# Patient Record
Sex: MALE | Race: WHITE | NOT HISPANIC OR LATINO | URBAN - METROPOLITAN AREA
[De-identification: names, ages, dates, MRNs, and addresses within clinical notes are randomized per-mention and may not be internally consistent; named-entity substitution may affect disease eponyms.]

---

## 2017-01-24 ENCOUNTER — APPOINTMENT (RX ONLY)
Dept: URBAN - METROPOLITAN AREA CLINIC 349 | Facility: CLINIC | Age: 82
Setting detail: DERMATOLOGY
End: 2017-01-24

## 2017-01-24 DIAGNOSIS — Z85.828 PERSONAL HISTORY OF OTHER MALIGNANT NEOPLASM OF SKIN: ICD-10-CM

## 2017-01-24 DIAGNOSIS — L57.0 ACTINIC KERATOSIS: ICD-10-CM

## 2017-01-24 DIAGNOSIS — L82.1 OTHER SEBORRHEIC KERATOSIS: ICD-10-CM

## 2017-01-24 PROBLEM — C44.311 BASAL CELL CARCINOMA OF SKIN OF NOSE: Status: ACTIVE | Noted: 2017-01-24

## 2017-01-24 PROCEDURE — A4550 SURGICAL TRAYS: HCPCS

## 2017-01-24 PROCEDURE — ? SHAVE REMOVAL AND DESTRUCTION

## 2017-01-24 PROCEDURE — ? PATHOLOGY BILLING

## 2017-01-24 PROCEDURE — ? LIQUID NITROGEN

## 2017-01-24 PROCEDURE — ? COUNSELING

## 2017-01-24 PROCEDURE — 99202 OFFICE O/P NEW SF 15 MIN: CPT | Mod: 25

## 2017-01-24 PROCEDURE — 88305 TISSUE EXAM BY PATHOLOGIST: CPT

## 2017-01-24 PROCEDURE — 17281 DSTR MAL LS F/E/E/N/L/M .6-1: CPT | Mod: 59

## 2017-01-24 PROCEDURE — 17281 DSTR MAL LS F/E/E/N/L/M .6-1: CPT | Mod: 59,76

## 2017-01-24 PROCEDURE — ? OTHER

## 2017-01-24 PROCEDURE — 17004 DESTROY PREMAL LESIONS 15/>: CPT

## 2017-01-24 ASSESSMENT — LOCATION DETAILED DESCRIPTION DERM
LOCATION DETAILED: RIGHT SUPERIOR PARIETAL SCALP
LOCATION DETAILED: RIGHT INFERIOR MEDIAL FOREHEAD
LOCATION DETAILED: RIGHT SUPERIOR MEDIAL FOREHEAD
LOCATION DETAILED: LEFT SUPERIOR PARIETAL SCALP
LOCATION DETAILED: LEFT INFERIOR FRONTAL SCALP
LOCATION DETAILED: RIGHT SUPERIOR MEDIAL FOREHEAD
LOCATION DETAILED: RIGHT RADIAL DORSAL HAND
LOCATION DETAILED: RIGHT CENTRAL TEMPLE
LOCATION DETAILED: LEFT INFERIOR LATERAL FOREHEAD
LOCATION DETAILED: LEFT CENTRAL FRONTAL SCALP
LOCATION DETAILED: RIGHT MID PREAURICULAR CHEEK
LOCATION DETAILED: RIGHT SUPERIOR POSTAURICULAR SKIN
LOCATION DETAILED: RIGHT SUPERIOR NASAL CHEEK
LOCATION DETAILED: LEFT SUPERIOR OCCIPITAL SCALP
LOCATION DETAILED: RIGHT DISTAL POSTERIOR THIGH
LOCATION DETAILED: RIGHT SUPERIOR FOREHEAD
LOCATION DETAILED: SUPERIOR THORACIC SPINE
LOCATION DETAILED: RIGHT MEDIAL FRONTAL SCALP
LOCATION DETAILED: LEFT MEDIAL SUPERIOR CHEST
LOCATION DETAILED: POSTERIOR MID-PARIETAL SCALP
LOCATION DETAILED: LEFT ANTERIOR DISTAL THIGH
LOCATION DETAILED: MID-OCCIPITAL SCALP
LOCATION DETAILED: LEFT SUPERIOR LATERAL MALAR CHEEK
LOCATION DETAILED: LEFT CENTRAL FRONTAL SCALP
LOCATION DETAILED: RIGHT CENTRAL PARIETAL SCALP

## 2017-01-24 ASSESSMENT — LOCATION SIMPLE DESCRIPTION DERM
LOCATION SIMPLE: RIGHT TEMPLE
LOCATION SIMPLE: LEFT OCCIPITAL SCALP
LOCATION SIMPLE: SCALP
LOCATION SIMPLE: CHEST
LOCATION SIMPLE: RIGHT CHEEK
LOCATION SIMPLE: RIGHT CHEEK
LOCATION SIMPLE: LEFT FOREHEAD
LOCATION SIMPLE: POSTERIOR SCALP
LOCATION SIMPLE: LEFT CHEEK
LOCATION SIMPLE: UPPER BACK
LOCATION SIMPLE: RIGHT POSTERIOR THIGH
LOCATION SIMPLE: LEFT THIGH
LOCATION SIMPLE: RIGHT FOREHEAD
LOCATION SIMPLE: SCALP
LOCATION SIMPLE: RIGHT SCALP
LOCATION SIMPLE: RIGHT FOREHEAD
LOCATION SIMPLE: RIGHT HAND

## 2017-01-24 ASSESSMENT — LOCATION ZONE DERM
LOCATION ZONE: SCALP
LOCATION ZONE: HAND
LOCATION ZONE: FACE
LOCATION ZONE: FACE
LOCATION ZONE: LEG
LOCATION ZONE: SCALP
LOCATION ZONE: TRUNK

## 2017-01-24 ASSESSMENT — PAIN INTENSITY VAS: HOW INTENSE IS YOUR PAIN 0 BEING NO PAIN, 10 BEING THE MOST SEVERE PAIN POSSIBLE?: NO PAIN

## 2017-01-24 NOTE — PROCEDURE: OTHER
Other (Free Text): After the procedure, the patient was observed 5-10 minutes and was oriented to person, place and time and denied feeling dizzy, queasy and stated that they  did not feel that they were going to faint
Note Text (......Xxx Chief Complaint.): This diagnosis correlates with the
Detail Level: Generalized

## 2017-01-24 NOTE — PROCEDURE: SHAVE REMOVAL AND DESTRUCTION
Notification Instructions: Patient will be notified of biopsy results. However, patient instructed to call the office if not contacted within 2 weeks.
Bill For Surgical Tray: yes
Anesthesia Type: 2% lidocaine with epinephrine
Consent: Written consent was obtained and risks were reviewed including but not limited to scarring, infection, bleeding, scabbing, incomplete removal, nerve damage and allergy to anesthesia.
Hemostasis: Drysol
Post-Care Instructions: I reviewed with the patient in detail post-care instructions. Patient is to keep the biopsy site dry overnight, and then apply bacitracin twice daily until healed. Patient may apply hydrogen peroxide soaks to remove any crusting.  After the procedure, the patient was observed for 5-10 minutes and was oriented to,person, place and time and denied feeling dizzy, queasy and stated that they were not going to faint
Size Of Lesion In Cm: 0
Bill [id number] For Specimen Handling/Conveyance To Laboratory?: no
Size After Destruction (Required For Destruction Billing): 0.7
Accession #: Dr newsome read
Size After Destruction (Required For Destruction Billing): 0.6
Billing Type: Third-Party Bill
Detail Level: Detailed
Bill As?: Note: Bill Malignant Destruction If Path Confirms Malignant Lesion. Only Bill As Shave Removal If Path Comes Back Benign. Do Not Bill Shave Removal On Malignant Lesions.: Malignant Destruction
Cautery Type: electrodesiccation
Number Of Curettages: 1
Wound Care: Vaseline
Dressing: dry sterile dressing

## 2017-01-24 NOTE — PROCEDURE: LIQUID NITROGEN
Duration Of Freeze Thaw-Cycle (Seconds): 3
Detail Level: Detailed
Consent: The patient's consent was obtained including but not limited to risks of crusting, scabbing, blistering, scarring, darker or lighter pigmentary change, recurrence, incomplete removal and infection.
Render Post-Care Instructions In Note?: no
Number Of Freeze-Thaw Cycles: 2 freeze-thaw cycles
Post-Care Instructions: I reviewed with the patient in detail post-care instructions. Patient is to wear sunprotection, and avoid picking at any of the treated lesions. Pt may apply Vaseline to crusted or scabbing areas.

## 2017-03-13 PROBLEM — H91.90 HEARING LOSS: Status: ACTIVE | Noted: 2017-03-13

## 2017-05-05 ENCOUNTER — APPOINTMENT (RX ONLY)
Dept: URBAN - METROPOLITAN AREA CLINIC 349 | Facility: CLINIC | Age: 82
Setting detail: DERMATOLOGY
End: 2017-05-05

## 2017-05-05 DIAGNOSIS — L85.3 XEROSIS CUTIS: ICD-10-CM

## 2017-05-05 DIAGNOSIS — L82.0 INFLAMED SEBORRHEIC KERATOSIS: ICD-10-CM

## 2017-05-05 DIAGNOSIS — Z85.828 PERSONAL HISTORY OF OTHER MALIGNANT NEOPLASM OF SKIN: ICD-10-CM | Status: STABLE

## 2017-05-05 DIAGNOSIS — L57.0 ACTINIC KERATOSIS: ICD-10-CM

## 2017-05-05 PROBLEM — K21.9 GASTRO-ESOPHAGEAL REFLUX DISEASE WITHOUT ESOPHAGITIS: Status: ACTIVE | Noted: 2017-05-05

## 2017-05-05 PROCEDURE — ? RECOMMENDATIONS

## 2017-05-05 PROCEDURE — ? COUNSELING

## 2017-05-05 PROCEDURE — 17004 DESTROY PREMAL LESIONS 15/>: CPT

## 2017-05-05 PROCEDURE — 99213 OFFICE O/P EST LOW 20 MIN: CPT | Mod: 25

## 2017-05-05 PROCEDURE — ? LIQUID NITROGEN

## 2017-05-05 PROCEDURE — 17110 DESTRUCTION B9 LES UP TO 14: CPT | Mod: 59

## 2017-05-05 ASSESSMENT — LOCATION SIMPLE DESCRIPTION DERM
LOCATION SIMPLE: RIGHT CHEEK
LOCATION SIMPLE: RIGHT HAND
LOCATION SIMPLE: UPPER BACK
LOCATION SIMPLE: LEFT FOREHEAD
LOCATION SIMPLE: LEFT CHEEK
LOCATION SIMPLE: LEFT TEMPLE
LOCATION SIMPLE: CHEST
LOCATION SIMPLE: LEFT OCCIPITAL SCALP
LOCATION SIMPLE: POSTERIOR SCALP
LOCATION SIMPLE: RIGHT FOREHEAD
LOCATION SIMPLE: LEFT HAND
LOCATION SIMPLE: RIGHT CLAVICULAR SKIN
LOCATION SIMPLE: SCALP

## 2017-05-05 ASSESSMENT — LOCATION DETAILED DESCRIPTION DERM
LOCATION DETAILED: RIGHT SUPERIOR OCCIPITAL SCALP
LOCATION DETAILED: RIGHT LATERAL MALAR CHEEK
LOCATION DETAILED: LEFT SUPERIOR POSTERIOR PARIETAL SCALP
LOCATION DETAILED: POSTERIOR MID-PARIETAL SCALP
LOCATION DETAILED: RIGHT LATERAL FOREHEAD
LOCATION DETAILED: RIGHT SUPERIOR MEDIAL MALAR CHEEK
LOCATION DETAILED: RIGHT SUPERIOR MEDIAL FOREHEAD
LOCATION DETAILED: RIGHT POSTERIOR PARIETAL SCALP
LOCATION DETAILED: RIGHT SUPERIOR PARIETAL SCALP
LOCATION DETAILED: RIGHT LATERAL SUBMANDIBULAR CHEEK
LOCATION DETAILED: LEFT RADIAL DORSAL HAND
LOCATION DETAILED: LEFT SUPERIOR PARIETAL SCALP
LOCATION DETAILED: LEFT LATERAL TEMPLE
LOCATION DETAILED: RIGHT CLAVICULAR SKIN
LOCATION DETAILED: LEFT SUPERIOR OCCIPITAL SCALP
LOCATION DETAILED: RIGHT RADIAL DORSAL HAND
LOCATION DETAILED: LEFT FOREHEAD
LOCATION DETAILED: LEFT INFERIOR CENTRAL MALAR CHEEK
LOCATION DETAILED: LEFT MEDIAL SUPERIOR CHEST
LOCATION DETAILED: RIGHT SUPERIOR FOREHEAD
LOCATION DETAILED: SUPERIOR THORACIC SPINE
LOCATION DETAILED: RIGHT MEDIAL SUPERIOR CHEST
LOCATION DETAILED: RIGHT SUPERIOR CENTRAL MALAR CHEEK
LOCATION DETAILED: RIGHT INFERIOR FOREHEAD
LOCATION DETAILED: LEFT LATERAL FOREHEAD

## 2017-05-05 ASSESSMENT — LOCATION ZONE DERM
LOCATION ZONE: SCALP
LOCATION ZONE: FACE
LOCATION ZONE: TRUNK
LOCATION ZONE: HAND

## 2017-05-05 ASSESSMENT — PAIN INTENSITY VAS: HOW INTENSE IS YOUR PAIN 0 BEING NO PAIN, 10 BEING THE MOST SEVERE PAIN POSSIBLE?: NO PAIN

## 2017-05-05 NOTE — PROCEDURE: RECOMMENDATIONS
Recommendations (Free Text): Dove soap and moisturizer. Samples of cetaphil and CeraVe cream apply twice daily as needed   to face
Detail Level: Zone

## 2017-05-05 NOTE — PROCEDURE: LIQUID NITROGEN
Consent: The patient's consent was obtained including but not limited to risks of crusting, scabbing, blistering, scarring, darker or lighter pigmentary change, recurrence, incomplete removal and infection.
Duration Of Freeze Thaw-Cycle (Seconds): 3
Number Of Freeze-Thaw Cycles: 2 freeze-thaw cycles
Detail Level: Detailed
Medical Necessity Clause: This procedure was medically necessary because the lesions that were treated were:
Add 52 Modifier (Optional): no
Post-Care Instructions: I reviewed with the patient in detail post-care instructions. Patient is to wear sunprotection, and avoid picking at any of the treated lesions. Pt may apply Vaseline to crusted or scabbing areas.
Medical Necessity Information: It is in your best interest to select a reason for this procedure from the list below. All of these items fulfill various CMS LCD requirements except the new and changing color options.
Number Of Freeze-Thaw Cycles: 1 freeze-thaw cycle
Include Z78.9 (Other Specified Conditions Influencing Health Status) As An Associated Diagnosis?: Yes

## 2017-06-05 ENCOUNTER — APPOINTMENT (RX ONLY)
Dept: URBAN - METROPOLITAN AREA CLINIC 349 | Facility: CLINIC | Age: 82
Setting detail: DERMATOLOGY
End: 2017-06-05

## 2017-06-05 PROBLEM — D04.62 CARCINOMA IN SITU OF SKIN OF LEFT UPPER LIMB, INCLUDING SHOULDER: Status: ACTIVE | Noted: 2017-06-05

## 2017-06-05 PROCEDURE — ? PATHOLOGY BILLING

## 2017-06-05 PROCEDURE — ? SHAVE REMOVAL AND DESTRUCTION

## 2017-06-05 PROCEDURE — 17261 DSTRJ MAL LES T/A/L .6-1.0CM: CPT

## 2017-06-05 PROCEDURE — 88305 TISSUE EXAM BY PATHOLOGIST: CPT

## 2017-06-05 PROCEDURE — ? COUNSELING

## 2017-06-05 PROCEDURE — A4550 SURGICAL TRAYS: HCPCS

## 2017-06-05 NOTE — PROCEDURE: SHAVE REMOVAL AND DESTRUCTION
Anesthesia Type: 1% lidocaine with epinephrine
Wound Care: Vaseline
Number Of Curettages: 1
Billing Type: Third-Party Bill
Was Curettage Performed?: Yes
Accession #: Dr newsome read
Size Of Lesion In Cm: 0.8
Dressing: dry sterile dressing
Bill [id number] For Specimen Handling/Conveyance To Laboratory?: no
Notification Instructions: Patient will be notified of biopsy results. However, patient instructed to call the office if not contacted within 2 weeks.
Bill As?: Note: Bill Malignant Destruction If Path Confirms Malignant Lesion. Only Bill As Shave Removal If Path Comes Back Benign. Do Not Bill Shave Removal On Malignant Lesions.: Malignant Destruction
Cautery Type: electrodesiccation
Consent: Written consent was obtained and risks were reviewed including but not limited to scarring, infection, bleeding, scabbing, incomplete removal, nerve damage and allergy to anesthesia.
Detail Level: Detailed
Anesthesia Volume In Cc: 0.4
Hemostasis: Electrocautery
Post-Care Instructions: I reviewed with the patient in detail post-care instructions. Patient is to keep the biopsy site dry overnight, and then apply bacitracin twice daily until healed. Patient may apply hydrogen peroxide soaks to remove any crusting.  After the procedure, the patient was observed for 5-10 minutes and was oriented to,person, place and time and denied feeling dizzy, queasy and stated that they were not going to faint

## 2017-07-10 ENCOUNTER — APPOINTMENT (RX ONLY)
Dept: URBAN - METROPOLITAN AREA CLINIC 349 | Facility: CLINIC | Age: 82
Setting detail: DERMATOLOGY
End: 2017-07-10

## 2017-07-10 DIAGNOSIS — L82.1 OTHER SEBORRHEIC KERATOSIS: ICD-10-CM

## 2017-07-10 DIAGNOSIS — L57.0 ACTINIC KERATOSIS: ICD-10-CM

## 2017-07-10 DIAGNOSIS — L21.8 OTHER SEBORRHEIC DERMATITIS: ICD-10-CM

## 2017-07-10 PROBLEM — I10 ESSENTIAL (PRIMARY) HYPERTENSION: Status: ACTIVE | Noted: 2017-07-10

## 2017-07-10 PROBLEM — D04.62 CARCINOMA IN SITU OF SKIN OF LEFT UPPER LIMB, INCLUDING SHOULDER: Status: ACTIVE | Noted: 2017-07-10

## 2017-07-10 PROCEDURE — 99213 OFFICE O/P EST LOW 20 MIN: CPT | Mod: 25

## 2017-07-10 PROCEDURE — ? PATHOLOGY BILLING

## 2017-07-10 PROCEDURE — ? PRESCRIPTION

## 2017-07-10 PROCEDURE — 88305 TISSUE EXAM BY PATHOLOGIST: CPT

## 2017-07-10 PROCEDURE — ? SHAVE REMOVAL AND DESTRUCTION

## 2017-07-10 PROCEDURE — 17271 DSTR MAL LES S/N/H/F/G 0.6-1: CPT

## 2017-07-10 PROCEDURE — ? LIQUID NITROGEN

## 2017-07-10 PROCEDURE — 17004 DESTROY PREMAL LESIONS 15/>: CPT

## 2017-07-10 PROCEDURE — ? COUNSELING

## 2017-07-10 PROCEDURE — A4550 SURGICAL TRAYS: HCPCS

## 2017-07-10 RX ORDER — KETOCONAZOLE 20 MG/G
CREAM TOPICAL
Qty: 1 | Refills: 5 | Status: ERX | COMMUNITY
Start: 2017-07-10

## 2017-07-10 RX ADMIN — KETOCONAZOLE: 20 CREAM TOPICAL at 00:00

## 2017-07-10 ASSESSMENT — LOCATION SIMPLE DESCRIPTION DERM
LOCATION SIMPLE: SCALP
LOCATION SIMPLE: RIGHT OCCIPITAL SCALP
LOCATION SIMPLE: LEFT FOREARM
LOCATION SIMPLE: RIGHT HAND
LOCATION SIMPLE: RIGHT SCALP
LOCATION SIMPLE: LEFT HAND
LOCATION SIMPLE: RIGHT TEMPLE
LOCATION SIMPLE: RIGHT FOREHEAD
LOCATION SIMPLE: RIGHT ZYGOMA
LOCATION SIMPLE: POSTERIOR SCALP
LOCATION SIMPLE: RIGHT FOREARM
LOCATION SIMPLE: LEFT FOREHEAD

## 2017-07-10 ASSESSMENT — LOCATION DETAILED DESCRIPTION DERM
LOCATION DETAILED: RIGHT CENTRAL TEMPLE
LOCATION DETAILED: LEFT SUPERIOR PARIETAL SCALP
LOCATION DETAILED: LEFT CENTRAL PARIETAL SCALP
LOCATION DETAILED: RIGHT PROXIMAL DORSAL FOREARM
LOCATION DETAILED: LEFT ULNAR DORSAL HAND
LOCATION DETAILED: RIGHT CENTRAL FRONTAL SCALP
LOCATION DETAILED: LEFT RADIAL DORSAL HAND
LOCATION DETAILED: MID-OCCIPITAL SCALP
LOCATION DETAILED: LEFT SUPERIOR FOREHEAD
LOCATION DETAILED: LEFT DISTAL DORSAL FOREARM
LOCATION DETAILED: LEFT PROXIMAL DORSAL FOREARM
LOCATION DETAILED: RIGHT LATERAL ZYGOMA
LOCATION DETAILED: RIGHT ULNAR DORSAL HAND
LOCATION DETAILED: RIGHT INFERIOR LATERAL FOREHEAD
LOCATION DETAILED: RIGHT CENTRAL PARIETAL SCALP
LOCATION DETAILED: RIGHT SUPERIOR OCCIPITAL SCALP

## 2017-07-10 ASSESSMENT — LOCATION ZONE DERM
LOCATION ZONE: ARM
LOCATION ZONE: FACE
LOCATION ZONE: HAND
LOCATION ZONE: SCALP

## 2017-07-10 ASSESSMENT — PAIN INTENSITY VAS: HOW INTENSE IS YOUR PAIN 0 BEING NO PAIN, 10 BEING THE MOST SEVERE PAIN POSSIBLE?: NO PAIN

## 2017-07-10 NOTE — PROCEDURE: SHAVE REMOVAL AND DESTRUCTION
Was Curettage Performed?: Yes
Size After Destruction (Required For Destruction Billing): 1
Dressing: dry sterile dressing
Post-Care Instructions: I reviewed with the patient in detail post-care instructions. Patient is to keep the biopsy site dry overnight, and then apply bacitracin twice daily until healed. Patient may apply hydrogen peroxide soaks to remove any crusting.  After the procedure, the patient was observed for 5-10 minutes and was oriented to,person, place and time and denied feeling dizzy, queasy and stated that they were not going to faint
Anesthesia Type: 1% lidocaine with epinephrine
Cautery Type: electrodesiccation
Consent: Written consent was obtained and risks were reviewed including but not limited to scarring, infection, bleeding, scabbing, incomplete removal, nerve damage and allergy to anesthesia.
Notification Instructions: Patient will be notified of biopsy results. However, patient instructed to call the office if not contacted within 2 weeks.
Number Of Curettages: 2
Billing Type: Third-Party Bill
Hemostasis: Electrocautery
Size Of Lesion In Cm: 0
Bill [id number] For Specimen Handling/Conveyance To Laboratory?: no
Detail Level: Detailed
Wound Care: Vaseline
Accession #: dr newsome read
Anesthesia Volume In Cc: 0.4
Bill As?: Note: Bill Malignant Destruction If Path Confirms Malignant Lesion. Only Bill As Shave Removal If Path Comes Back Benign. Do Not Bill Shave Removal On Malignant Lesions.: Malignant Destruction

## 2017-07-10 NOTE — PROCEDURE: LIQUID NITROGEN
Detail Level: Zone
Post-Care Instructions: I reviewed with the patient in detail post-care instructions. Patient is to wear sunprotection, and avoid picking at any of the treated lesions. Pt may apply Vaseline to crusted or scabbing areas.
Duration Of Freeze Thaw-Cycle (Seconds): 3
Number Of Freeze-Thaw Cycles: 2 freeze-thaw cycles
Consent: The patient's consent was obtained including but not limited to risks of crusting, scabbing, blistering, scarring, darker or lighter pigmentary change, recurrence, incomplete removal and infection.
Render Post-Care Instructions In Note?: no

## 2017-08-16 ENCOUNTER — RX ONLY (OUTPATIENT)
Age: 82
Setting detail: RX ONLY
End: 2017-08-16

## 2017-08-16 RX ORDER — KETOCONAZOLE 20 MG/G
CREAM TOPICAL
Qty: 1 | Refills: 5 | Status: CANCELLED
Stop reason: CLARIF

## 2017-08-16 RX ORDER — KETOCONAZOLE 20 MG/G
CREAM TOPICAL
Qty: 1 | Refills: 5 | Status: CANCELLED

## 2017-08-23 ENCOUNTER — APPOINTMENT (RX ONLY)
Dept: URBAN - METROPOLITAN AREA CLINIC 349 | Facility: CLINIC | Age: 82
Setting detail: DERMATOLOGY
End: 2017-08-23

## 2017-08-23 DIAGNOSIS — L57.0 ACTINIC KERATOSIS: ICD-10-CM

## 2017-08-23 PROCEDURE — 17000 DESTRUCT PREMALG LESION: CPT

## 2017-08-23 PROCEDURE — ? LIQUID NITROGEN

## 2017-08-23 PROCEDURE — ? PRESCRIPTION

## 2017-08-23 PROCEDURE — ? COUNSELING

## 2017-08-23 PROCEDURE — 17003 DESTRUCT PREMALG LES 2-14: CPT

## 2017-08-23 RX ORDER — FLUOROURACIL 50 MG/G
CREAM TOPICAL
Qty: 1 | Refills: 1 | Status: ERX | COMMUNITY
Start: 2017-08-23

## 2017-08-23 RX ADMIN — FLUOROURACIL: 50 CREAM TOPICAL at 00:00

## 2017-08-23 ASSESSMENT — LOCATION ZONE DERM
LOCATION ZONE: FACE
LOCATION ZONE: EAR
LOCATION ZONE: SCALP

## 2017-08-23 ASSESSMENT — LOCATION DETAILED DESCRIPTION DERM
LOCATION DETAILED: LEFT MEDIAL FRONTAL SCALP
LOCATION DETAILED: LEFT CENTRAL PARIETAL SCALP
LOCATION DETAILED: LEFT CENTRAL FRONTAL SCALP
LOCATION DETAILED: LEFT ANTITRAGUS
LOCATION DETAILED: LEFT SUPERIOR FOREHEAD
LOCATION DETAILED: RIGHT SUPERIOR PARIETAL SCALP

## 2017-08-23 ASSESSMENT — LOCATION SIMPLE DESCRIPTION DERM
LOCATION SIMPLE: LEFT FOREHEAD
LOCATION SIMPLE: LEFT SCALP
LOCATION SIMPLE: SCALP
LOCATION SIMPLE: LEFT EAR

## 2017-08-23 NOTE — PROCEDURE: LIQUID NITROGEN
Consent: The patient's consent was obtained including but not limited to risks of crusting, scabbing, blistering, scarring, darker or lighter pigmentary change, recurrence, incomplete removal and infection.
Post-Care Instructions: I reviewed with the patient in detail post-care instructions. Patient is to wear sunprotection, and avoid picking at any of the treated lesions. Pt may apply Vaseline to crusted or scabbing areas.
Render Post-Care Instructions In Note?: no
Detail Level: Zone
Number Of Freeze-Thaw Cycles: 2 freeze-thaw cycles
Duration Of Freeze Thaw-Cycle (Seconds): 3

## 2017-10-05 PROBLEM — M16.11 PRIMARY OSTEOARTHRITIS OF RIGHT HIP: Status: ACTIVE | Noted: 2017-10-05

## 2017-10-10 ENCOUNTER — HOSPITAL ENCOUNTER (OUTPATIENT)
Dept: PHYSICAL THERAPY | Age: 82
Discharge: HOME | End: 2017-10-10
Attending: INTERNAL MEDICINE
Payer: MEDICARE

## 2017-10-10 DIAGNOSIS — M16.11 PRIMARY OSTEOARTHRITIS OF RIGHT HIP: ICD-10-CM

## 2017-10-10 PROCEDURE — 97110 THERAPEUTIC EXERCISES: CPT | Performed by: PHYSICAL THERAPIST

## 2017-10-10 PROCEDURE — 97140 MANUAL THERAPY 1/> REGIONS: CPT | Performed by: PHYSICAL THERAPIST

## 2017-10-10 PROCEDURE — G8981 BODY POS CURRENT STATUS: HCPCS | Performed by: PHYSICAL THERAPIST

## 2017-10-10 PROCEDURE — G8982 BODY POS GOAL STATUS: HCPCS | Performed by: PHYSICAL THERAPIST

## 2017-10-10 PROCEDURE — 97161 PT EVAL LOW COMPLEX 20 MIN: CPT | Performed by: PHYSICAL THERAPIST

## 2017-10-10 NOTE — PROGRESS NOTES
Ambulatory/Rehab Services H2 Model Falls Risk Assessment    Risk Factor Pts. ·   Confusion/Disorientation/Impulsivity  []    4 ·   Symptomatic Depression  []   2 ·   Altered Elimination  []   1 ·   Dizziness/Vertigo  []   1 ·   Gender (Male)  [x]   1 ·   Any administered antiepileptics (anticonvulsants):  []   2 ·   Any administered benzodiazepines:  []   1 ·   Visual Impairment (specify):  []   1 ·   Portable Oxygen Use  []   1 ·   Orthostatic ? BP  []   1 ·   History of Recent Falls (within 3 mos.)  []   5     Ability to Rise from Chair (choose one) Pts. ·   Ability to rise in a single movement  []   0 ·   Pushes up, successful in one attempt  []   1 ·   Multiple attempts, but successful  [x]   3 ·   Unable to rise without assistance  []   4   Total: (5 or greater = High Risk) 4     Falls Prevention Plan:   []                Physical Limitations to Exercise (specify):   []                Mobility Assistance Device (type):   []                Exercise/Equipment Adaptation (specify):    ©2010 [facility] [address] Patent #[id number].  Federal Law prohibits the replication, distribution or use without written permission from [facility] [employer]

## 2017-10-10 NOTE — PROGRESS NOTES
Willy New  : 1935 [address] [facility] at [address], [address], [place], [address] [address]  [phone]   [phone]         OUTPATIENT PHYSICAL THERAPY:Initial Assessment 10/10/2017    ICD-10: Treatment Diagnosis: (M25.551) R hip pain; (M62.81) muscle weakness; (R26.2) Difficulty walking  Precautions/Allergies:   Latex   Fall Risk Score: 4 (? 5 = High Risk)  MD Orders: Evaluate and Treat for R hip pain secondary to primary OA MEDICAL/REFERRING DIAGNOSIS:  Primary osteoarthritis of right hip [M16.11]   DATE OF ONSET: 17  REFERRING PHYSICIAN: Rios Gonsales MD  RETURN PHYSICIAN APPOINTMENT: PRN     INITIAL ASSESSMENT:  Mr. Marya Santamaria presents today with functional limitations associated with the diagnosis of severe R hip OA, as well as, L anterior innominate rotation, as well as, mild rotational fault at L5 contributing to increased R side anterior/lateral hip pain and weakness. He currently requires assistance with supine to sit to stand transfers and cannot sleep in the bed due to pain. Pt states all of this occurred after lying on a table for x-rays. Pt will benefit from skilled PT to address the following deficits. PROBLEM LIST (Impacting functional limitations):  1. Decreased Strength  2. Decreased ADL/Functional Activities  3. Decreased Transfer Abilities  4. Decreased Ambulation Ability/Technique  5. Decreased Balance  6. Increased Pain  7. Decreased Activity Tolerance  8. Decreased Pacing Skills  9. Increased Fatigue  10. Increased Shortness of Breath  11. Decreased Flexibility/Joint Mobility  12. Decreased Knowledge of Precautions  13. Decreased [place] with Home Exercise Program INTERVENTIONS PLANNED:  1. Balance Exercise  2. Bed Mobility  3. Cold  4. Cryotherapy  5. Family Education  6. Gait Training  7. Heat  8. Home Exercise Program (HEP)  9. Manual Therapy  10. Neuromuscular Re-education/Strengthening  11.  Range of Motion (ROM)  12. Therapeutic Activites  13. Therapeutic Exercise/Strengthening  14. Transfer Training  15. Ultrasound (US)  16. Aquatic Therapy   17. Kinesiotaping   TREATMENT PLAN:  Effective Dates: 10/10/17 TO 1/2/18. Frequency/Duration: 1-2 times a week for 12 weeks  GOALS: (Goals have been discussed and agreed upon with patient.)  Short-Term Functional Goals: Time Frame: 4-6 weeks (11-21-17)  1. Pt will be compliant and independent with HEP. 2. Pt will improve LEFS score to 22/80 to increase pt's overall functional mobility. 3. Pt will improve stdg R hip AROM to > Flex: 30deg; ABD:25deg; Ext: 25deg; ER:12deg and IR:5deg to increase pt's overall functional mobility. 4. Pt will be able to sit-stand from std height w/ use of UE for A to rise. 5. Pt will be able to transfer into and out of bed with minimal assistance from his wife. 6. Pt will be able to return to sleeping in his bed due to decreased R hip/leg pain. Discharge Goals: Time Frame: 8-12 weeks (1-2-18)  1. Pt will improve LEFS score to 30/80 to increase pt's overall functional mobility. 2. Pt will improve FLR from  CM to  CL to increase pt's overall function. 3. Pt will be able to sit-stand from std height w/use of 1 UE only for A.  4. Pt will be able to transfer into and out of bed independently. 5. Pt will be able to ambulate with a near normal gait pattern with LRD with manageable R hip/leg pain. 6. Pt will be able to return to driving, and subjectively report greater ease and independence with car transfers. 7. Pt will be DC'd from PT to HEP. Rehabilitation Potential For Stated Goals: [id number] [facility] therapy, I certify that the treatment plan above will be carried out by a therapist or under their direction.   Thank you for this referral,  Lucita Casey, PT     Referring Physician Signature: Aster Ross MD              Date                    The information in this section was collected on 10/10/17 (except where otherwise noted). HISTORY:   History of Present Injury/Illness (Reason for Referral):  Pt reports he's had R hip pain off and on for years due to severe R hip OA. He states he's been evaluated by multiple orthopedists which all state he's inoperable. He states he was most recently evaluated by Dr. Alona Osorio who injected his hip a couple months ago, which did not improve. He states he returned to Dr. Alona Osorio for further evaluation, and experienced severe R hip/leg pain after being x-rayed. He states he's been unable to drive, and has had severe difficulty with transfers since that time. Pt reports he takes Tylenol for the pain, but nothing really helps. He states supine to sit to stand and car transfers aggravate his pain badly. He states he must now sleep in his recliner and is no longer driving. PATIENT HAS A PACEMAKER AND R TKA W? FLEX 85 DEG. Past Medical History/Comorbidities:   Mr. Tremayne Hernandez  has a past medical history of Arthritis; Atrial fibrillation ([address] CAD (coronary artery disease); Cancer ([address] DM (diabetes mellitus), type 2 ([address] Family history of prostate problems; GERD (gastroesophageal reflux disease); Hearing difficulty; HLD (hyperlipidemia); Hypertension; Hypothyroidism; Insulin dependent diabetes mellitus ([address] Kidney problem; and Systolic heart failure ([address] Mr. Tremayne Hernandez  has a past surgical history that includes coronary artery bypass graft; other surgical; other surgical; cataract removal (04/2011); tonsillectomy; hernia repair (08/2004); appendectomy (08/2004); prostatectomy (03/01/2006); cardiac surg procedure unlist (06/2007); aortic valve replacement (11/10/2011); cardiac surg procedure unlist (11/14/2011); cardiac surg procedure unlist (05/10/2013); orthopaedic (1973); and knee replacement (04/25/2016). Social History/Living Environment:     Pt lives in a 1 story home with his wife with 1 small step to enter.  He states he has a raised toilet seat, as well as, walk-in shower and shower chair. He has a rolling collin walker, and a portable lift seat that he takes everywhere. He states he's used it for years due to his R knee immobility. Pt/wife questioned me regarding multiple other pieces of equipment that they are interested in getting for their home that would allow him greater ease with everything, but require more work/assistance from his wife, who is also being treated by PT. I explained I would hold off on any other equipment until we try PT to aide with managing pain and improving strength and mobility. I also voiced my opinion that if he does not begin to try to use his LE, he will continue to lose motion and strength. Pt has a low pain tolerance, which is also contributing to his current situation. He and his wife admitted that he has limited motion in his R knee because PT was too painful at his TKA. Prior Level of Function/Work/Activity:  Pt's overall mobility has been limited for several years due to his severe OA, co-morbidities listed above, low pain tolerance, and personality that he likes to dictate what and how he's going to do things. He states it worsened after he had the x-rays at [facility], and he can no longer transfer independently, drive or sleep in his bed. Dominant Side:         RIGHT    Current Medications:       Current Outpatient Prescriptions:     GLUC/CHND/OM3/DHA/EPA/FISH/STR (GLUCOSAMINE CHONDROITIN PLUS PO), Take 2 Caplet by mouth daily. , Disp: , Rfl:     omeprazole (PRILOSEC) 20 mg capsule, Take 1 capsule by mouth  daily, Disp: 90 Cap, Rfl: 3    potassium chloride (K-DUR, KLOR-CON) 20 mEq tablet, Take 1 tablet by mouth  daily, Disp: 90 Tab, Rfl: 3    simvastatin (ZOCOR) 20 mg tablet, Take 1 tablet by mouth  nightly, Disp: 90 Tab, Rfl: 3    warfarin (COUMADIN) 5 mg tablet, 1 to 1&1/2 tabs every day or as directed, Disp: 120 Tab, Rfl: 3    insulin aspart (NOVOLOG) 100 unit/mL injection, by SubCUTAneous route as needed. , Disp: , Rfl:     clobetasol (TEMOVATE) 0.05 % ointment, Apply  to affected area two (2) times a day., Disp: 15 g, Rfl: 12    tamsulosin (FLOMAX) 0.4 mg capsule, TAKE 1 CAPSULE BY MOUTH EVERY DAY, Disp: 90 Cap, Rfl: 3    carvedilol (COREG) 6.25 mg tablet, Take 1 Tab by mouth two (2) times daily (with meals). , Disp: 180 Tab, Rfl: 2    furosemide (LASIX) 40 mg tablet, Take 1 Tab by mouth two (2) times a day., Disp: 180 Tab, Rfl: 2    levothyroxine (SYNTHROID) 125 mcg tablet, Take 1 Tab by mouth Daily (before breakfast). , Disp: 90 Tab, Rfl: 3    lisinopril (ZESTRIL) 2.5 mg tablet, Take 1 Tab by mouth daily. , Disp: 90 Tab, Rfl: 3    metOLazone (ZAROXOLYN) 2.5 mg tablet, Take 1 Tab by mouth as needed. , Disp: 90 Tab, Rfl: 1    lancets 33 gauge misc, Test BS TID/PRN Dx E11.9. Pt uses one touch delica 33 gauge lancets, Disp: 1 Package, Rfl: 12    Insulin Needles, Disposable, (BD INSULIN PEN NEEDLE UF MINI) 31 gauge x 3/16\" ndle, Use as directed, E11.9, Disp: 100 Pen Needle, Rfl: 12    lancets (ONETOUCH DELICA LANCETS) 30 gauge misc, Testing TID/PRN, E11.9, Disp: 1 Package, Rfl: 12    glucose blood VI test strips (ONETOUCH ULTRA TEST) strip, TEST BS TID AM DX E11.22, Disp: 100 Strip, Rfl: 12    aspirin delayed-release 81 mg tablet, Take 81 mg by mouth daily. , Disp: , Rfl:     multivitamin (ONE A DAY) tablet, Take 1 Tab by mouth daily. , Disp: , Rfl:     insulin glargine (LANTUS) 100 unit/mL injection, 16 Units by SubCUTAneous route. As directed , Disp: , Rfl:     ferrous sulfate (IRON) 325 mg (65 mg iron) tablet, Take 65 mg by mouth Daily (before breakfast). , Disp: , Rfl:    Date Last Reviewed:  10/10/17   Number of Personal Factors/Comorbidities that affect the Plan of Care: 0: LOW COMPLEXITY   EXAMINATION:   Observation/Orthostatic Postural Assessment:          Pt stands with forward head, rounded shoulders, posterior pelvic tilt with equal WB B.   Palpation:          Pt moderately tender with palpation to R GT, as well as, TFL/ITB. Gait: Pt ambulates with RW with equal stance time B, but very short , shuffling step-length. He is able to ambulate without his RW, but it's not safe. Pt had to be encouraged multiple times throughout treatment to avoid ambulating without an AD. Transfers:      Sit-stand: pt requires a moderately elevated surface and uses B UE, as well as, compensatory weight shifting to the L to rise. This is due to R knee immobility, as well as R hip pain and R LE weakness. Supine-sit: despite education on prper technique, pt unwilling to perform and required moderate assistance from me both directions. LE AROM/Strength   DATE  10/10/17 DATE     Hip Flexion R: 20deg;2+/5  L: 40deg; 3+/5 R:   L:    Hip Abduction  R: 15deg; 2-/5  L: 45deg; 3-/5 R:   L:    Hip Adduction R:2+/5  L:3+/5 R:  L:   Hip ER R:8deg; 2-/5  L:20deg; 3-/5 R:  L:   Hip IR R:0deg; 2-/5  L:15deg; 2/5 R:  L:   Hip Ext R: 15deg; 2-/5  L: 30deg;3-/5 R:   L:    Knee Flexion  R: 85deg; 3-/5  L: 105deg; 3-/5 R:   L:    Knee Extension  R: 5deg; 3/5  L: 0deg; 3+/5 R:   L:    Flexibility (hip flexors/ITB/Quad/HS R:Severe tigthness/tenderness throughout  L:WFL      Special Test/Function:  Pelvic Obliquity: L anterior innominate rotation  Lumbar Rotational Fault: FRSR L  Stairs: Unable  Heel raises: Moderate difficulty w/ B UE A  Balance: Fair static balance as observed though activities in the gym today; dynamic: further testing required. Danny's Test: + R  FADIR:+ R  APARNA: + R  SI Compression: + R       Body Structures Involved:  1. Nerves  2. Bones  3. Joints  4. Muscles  5. Ligaments Body Functions Affected:  1. Sensory/Pain  2. Neuromusculoskeletal  3. Movement Related Activities and Participation Affected:  1. Mobility  2. Self Care  3. Domestic Life  4. Interpersonal Interactions and Relationships  5. Community, Social and [place]  6.  Driving   Number of elements (examined above) that affect the Plan of Care: 1-2: LOW COMPLEXITY CLINICAL PRESENTATION:   Presentation: Stable and uncomplicated: LOW COMPLEXITY   CLINICAL DECISION MAKING:   Outcome Measure: Tool Used: Lower Extremity Functional Scale (LEFS)  Score:  Initial: 16/80=20% Function Most Recent: X/80 (Date: -- )   Interpretation of Score: 20 questions each scored on a 5 point scale with 0 representing \"extreme difficulty or unable to perform\" and 4 representing \"no difficulty\". The lower the score, the greater the functional disability. 80/80 represents no disability. Minimal detectable change is 9 points. Score 80 79-63 62-48 47-32 31-16 15-1 0   Modifier CH CI CJ CK CL CM CN     ? Changing and Maintaining Body Position:    [id number] - CURRENT STATUS: CM - 80%-99% impaired, limited or restricted    - GOAL STATUS: CL - 60%-79% impaired, limited or restricted    - D/C STATUS:  ---------------To be determined---------------      Medical Necessity:   · Patient is expected to demonstrate progress in strength, range of motion, balance, coordination, functional technique and transfers to decrease assistance required with transfers, increase independence with driving, improve safety during gait and decrease overall pain. .  Reason for Services/Other Comments:  · Patient is a very independent and traditional individual who would rather pay for independence equipment, rather than being patient and working towards it through movement and strengtheing. Because land therapy is very difficult and painful for him, he may be more appropriate for aquatic therapy, at least initially. I discussed this with the patient. He is open to it, if land PT worsens his symptoms and/or I feel he's not able to participate accordingly.  .   Use of outcome tool(s) and clinical judgement create a POC that gives a: Clear prediction of patient's progress: LOW COMPLEXITY            TREATMENT:   (In addition to Assessment/Re-Assessment sessions the following treatments were rendered)  Pre-treatment Symptoms/Complaints:  Pt c/o severe R hip, thigh leg pain, especially with transfers and exercises. Pain: Initial:     9/10 Post Session:  6/10     THERAPEUTIC EXERCISE: (30 minutes):  Exercises per grid below to improve mobility, strength, balance and coordination. Required maximal visual, verbal, manual and tactile cues to promote proper body alignment, promote proper body posture, promote proper body mechanics and promote proper body breathing techniques. Progressed resistance, range, repetitions and complexity of movement as indicated. MANUAL THERAPY: (15 minutes): Joint mobilization and Soft tissue mobilization was utilized and necessary because of the patient's restricted joint motion, loss of articular motion and restricted motion of soft tissue. Re-assessment was performed today (see above assessment section). Separate time was dedicated today for this re-assessment. 30minutes   Date:  10/10/17 Date:   Date:     Activity/Exercise Parameters Parameters Parameters   LTR 20x w/ OP to L     TAs 5 x 5sec     Heel slides flex and abd w/ ball or pillow case 10x each-mod to severe pain     Adductor squeeze 10 x 5sec-mod to severe pain     Sit-stand 5x     MET L HS/R quads x 4 rounds     STM/MFR to R TFL/ITB both manually and with foam roller done     Education Posture, log rolling, supine-to transfers         Treatment/Session Assessment:  See above  · Response to Treatment:  Pt with minimally decreased R hip pain, as well as, improved R hip abduction and flexion AROM after exercises and manual therapy. · Compliance with Program/Exercises: Will assess as treatment progresses. · Recommendations/Intent for next treatment session:  \"Next visit will focus on advancements to more challenging activities, reduction in assistance provided and manual therapy to address soft tissue and joint restrictions   ·   Future Appointments  Date Time Provider Efraín Mcgraw   10/17/2017 11:30 AM GVLLE DEVICE [address] [facility]   10/17/2017 4:00 PM Nadia Yolande, PT [facility]   10/24/2017 1:00 PM Nadia Yolande, PT [facility]   10/26/2017 11:00 AM Nadia Yolande, PT [facility]   10/31/2017 3:00 PM Nadia [place], PT [facility]   11/2/2017 11:15 AM Miles Bashir PT [facility]   11/2/2017 2:00 PM Nadia [place], PT [facility]   11/16/2017 11:00 AM [facility]   11/22/2017 11:00 AM ALAYNA Erwin MD [facility]   3/12/2018 11:30 AM GVLLE ECHO 26 [facility]   3/26/2018 11:45 AM Blaire Leblanc MD [facility]   7/12/2018 3:20 PM GUANACO Minor MD [facility] ·   \".   Total Treatment Duration:  :PT Patient Time In/Time Out  Time In: 1225  [facility]

## 2017-10-19 ENCOUNTER — HOME HEALTH ADMISSION (OUTPATIENT)
Dept: HOME HEALTH SERVICES | Facility: HOME HEALTH | Age: 82
End: 2017-10-19
Payer: MEDICARE

## 2017-10-23 ENCOUNTER — HOME CARE VISIT (OUTPATIENT)
Dept: SCHEDULING | Facility: HOME HEALTH | Age: 82
End: 2017-10-23
Payer: MEDICARE

## 2017-10-23 VITALS
SYSTOLIC BLOOD PRESSURE: 118 MMHG | DIASTOLIC BLOOD PRESSURE: 70 MMHG | OXYGEN SATURATION: 98 % | HEART RATE: 70 BPM | RESPIRATION RATE: 16 BRPM

## 2017-10-23 PROCEDURE — G0151 HHCP-SERV OF PT,EA 15 MIN: HCPCS

## 2017-10-23 PROCEDURE — 3331090002 HH PPS REVENUE DEBIT

## 2017-10-23 PROCEDURE — 400013 HH SOC

## 2017-10-23 PROCEDURE — 3331090001 HH PPS REVENUE CREDIT

## 2017-10-24 ENCOUNTER — APPOINTMENT (OUTPATIENT)
Dept: PHYSICAL THERAPY | Age: 82
End: 2017-10-24
Attending: INTERNAL MEDICINE
Payer: MEDICARE

## 2017-10-24 PROCEDURE — 3331090002 HH PPS REVENUE DEBIT

## 2017-10-24 PROCEDURE — 3331090001 HH PPS REVENUE CREDIT

## 2017-10-25 ENCOUNTER — HOME CARE VISIT (OUTPATIENT)
Dept: SCHEDULING | Facility: HOME HEALTH | Age: 82
End: 2017-10-25
Payer: MEDICARE

## 2017-10-25 PROCEDURE — 3331090001 HH PPS REVENUE CREDIT

## 2017-10-25 PROCEDURE — 3331090002 HH PPS REVENUE DEBIT

## 2017-10-25 PROCEDURE — G0157 HHC PT ASSISTANT EA 15: HCPCS

## 2017-10-26 ENCOUNTER — APPOINTMENT (OUTPATIENT)
Dept: PHYSICAL THERAPY | Age: 82
End: 2017-10-26
Attending: INTERNAL MEDICINE
Payer: MEDICARE

## 2017-10-26 PROCEDURE — 3331090001 HH PPS REVENUE CREDIT

## 2017-10-26 PROCEDURE — 3331090002 HH PPS REVENUE DEBIT

## 2017-10-27 ENCOUNTER — HOME CARE VISIT (OUTPATIENT)
Dept: SCHEDULING | Facility: HOME HEALTH | Age: 82
End: 2017-10-27
Payer: MEDICARE

## 2017-10-27 VITALS
HEART RATE: 78 BPM | RESPIRATION RATE: 18 BRPM | TEMPERATURE: 98.8 F | SYSTOLIC BLOOD PRESSURE: 132 MMHG | DIASTOLIC BLOOD PRESSURE: 70 MMHG

## 2017-10-27 PROCEDURE — 3331090002 HH PPS REVENUE DEBIT

## 2017-10-27 PROCEDURE — G0155 HHCP-SVS OF CSW,EA 15 MIN: HCPCS

## 2017-10-27 PROCEDURE — 3331090001 HH PPS REVENUE CREDIT

## 2017-10-27 PROCEDURE — G0157 HHC PT ASSISTANT EA 15: HCPCS

## 2017-10-27 NOTE — PROGRESS NOTES
Zhao Mcintosh  : 1935 [id number] [facility],[address] Floor at [address], [address], [facility], [address] [address]  [phone]   [phone]         OUTPATIENT PHYSICAL THERAPY:Discontinuation Summary 10/27/2017    ICD-10: Treatment Diagnosis: (M25.551) R hip pain; (M62.81) muscle weakness; (R26.2) Difficulty walking  Precautions/Allergies:   Latex   Fall Risk Score: 4 (? 5 = High Risk)  MD Orders: Evaluate and Treat for R hip pain secondary to primary OA MEDICAL/REFERRING DIAGNOSIS:  Primary osteoarthritis of right hip [M16.11]   DATE OF ONSET: 17  REFERRING PHYSICIAN: Noemy Handley MD  RETURN PHYSICIAN APPOINTMENT: PRN      ASSESSMENT: Zhao Mcintosh has been seen in physical therapy on 10/10/17 for 1 visit. Treatment has been discontinued at this time due to Decline in medical status and patient failing to return for additional treatment. Pt's wife notified me that due to his decline in medical status, he is going to pursue [facility] PT at this time. No goals were met. Pt is DC'd from PT to HEP. Thank you for this referral.      TREATMENT PLAN:  Effective Dates: 10/10/17 TO 18. Frequency/Duration: 1-2 times a week for 12 weeks  GOALS: (Goals have been discussed and agreed upon with patient.)  Short-Term Functional Goals: Time Frame: 4-6 weeks (17)  1. Pt will be compliant and independent with HEP.----------------------------------------------------------------------NOT MET  2. Pt will improve LEFS score to 22/80 to increase pt's overall functional mobility.----------------------------------------NOT MET  3. Pt will improve stdg R hip AROM to > Flex: 30deg; ABD:25deg; Ext: 25deg; ER:12deg and IR:5deg to increase pt's overall functional mobility.---------------NOT MET  4. Pt will be able to sit-stand from std height w/ use of UE for A to rise.----------------------------------------------NOT MET  5.  Pt will be able to transfer into and out of bed with minimal assistance from his wife.---------------------------------NOT MET  6. Pt will be able to return to sleeping in his bed due to decreased R hip/leg pain.----------------------------------------------NOT MET    Discharge Goals: Time Frame: 8-12 weeks (1-2-18)  1. Pt will improve LEFS score to 30/80 to increase pt's overall functional mobility.---------------------------------------------NOT MET  2. Pt will improve FLR from  CM to  CL to increase pt's overall function.-------------------------------------------NOT MET  3. Pt will be able to sit-stand from std height w/use of 1 UE only for A.--------------------------------------------------------NOT MET  4. Pt will be able to transfer into and out of bed independently.-----------------------------------------------------------------------NOT MET  5. Pt will be able to ambulate with a near normal gait pattern with LRD with manageable R hip/leg pain. -----------------------------NOT MET  6. Pt will be able to return to driving, and subjectively report greater ease and independence with car transfers. --------------------------NOT MET  7.  Pt will be DC'd from PT to HEP.------------------------------------------------------------------------------------------------------------------------------------------MET

## 2017-10-28 PROCEDURE — 3331090002 HH PPS REVENUE DEBIT

## 2017-10-28 PROCEDURE — 3331090001 HH PPS REVENUE CREDIT

## 2017-10-29 PROCEDURE — 3331090002 HH PPS REVENUE DEBIT

## 2017-10-29 PROCEDURE — 3331090001 HH PPS REVENUE CREDIT

## 2017-10-30 ENCOUNTER — HOME CARE VISIT (OUTPATIENT)
Dept: SCHEDULING | Facility: HOME HEALTH | Age: 82
End: 2017-10-30
Payer: MEDICARE

## 2017-10-30 VITALS
SYSTOLIC BLOOD PRESSURE: 126 MMHG | TEMPERATURE: 96.2 F | DIASTOLIC BLOOD PRESSURE: 66 MMHG | RESPIRATION RATE: 18 BRPM | HEART RATE: 72 BPM

## 2017-10-30 PROCEDURE — 3331090002 HH PPS REVENUE DEBIT

## 2017-10-30 PROCEDURE — G0157 HHC PT ASSISTANT EA 15: HCPCS

## 2017-10-30 PROCEDURE — 3331090001 HH PPS REVENUE CREDIT

## 2017-10-31 ENCOUNTER — APPOINTMENT (OUTPATIENT)
Dept: PHYSICAL THERAPY | Age: 82
End: 2017-10-31
Attending: INTERNAL MEDICINE
Payer: MEDICARE

## 2017-10-31 PROCEDURE — 3331090002 HH PPS REVENUE DEBIT

## 2017-10-31 PROCEDURE — 3331090001 HH PPS REVENUE CREDIT

## 2017-11-01 ENCOUNTER — HOME CARE VISIT (OUTPATIENT)
Dept: SCHEDULING | Facility: HOME HEALTH | Age: 82
End: 2017-11-01
Payer: MEDICARE

## 2017-11-01 VITALS
TEMPERATURE: 98 F | HEART RATE: 78 BPM | RESPIRATION RATE: 18 BRPM | SYSTOLIC BLOOD PRESSURE: 140 MMHG | DIASTOLIC BLOOD PRESSURE: 78 MMHG

## 2017-11-01 PROCEDURE — G0157 HHC PT ASSISTANT EA 15: HCPCS

## 2017-11-01 PROCEDURE — 3331090001 HH PPS REVENUE CREDIT

## 2017-11-01 PROCEDURE — 3331090002 HH PPS REVENUE DEBIT

## 2017-11-02 ENCOUNTER — HOSPITAL ENCOUNTER (OUTPATIENT)
Dept: LAB | Age: 82
Discharge: HOME | End: 2017-11-02
Payer: MEDICARE

## 2017-11-02 ENCOUNTER — APPOINTMENT (OUTPATIENT)
Dept: PHYSICAL THERAPY | Age: 82
End: 2017-11-02
Attending: INTERNAL MEDICINE
Payer: MEDICARE

## 2017-11-02 DIAGNOSIS — Z95.810 PRESENCE OF CARDIAC DEFIBRILLATOR: ICD-10-CM

## 2017-11-02 LAB
ANION GAP SERPL CALC-SCNC: 7 MMOL/L
BASOPHILS # BLD: 0.1 K/UL (ref 0–0.2)
BASOPHILS NFR BLD: 1 % (ref 0–2)
BUN SERPL-MCNC: 59 MG/DL (ref 8–23)
CALCIUM SERPL-MCNC: 8.4 MG/DL (ref 8.3–10.4)
CHLORIDE SERPL-SCNC: 105 MMOL/L (ref 98–107)
CO2 SERPL-SCNC: 29 MMOL/L (ref 21–32)
CREAT SERPL-MCNC: 1.8 MG/DL (ref 0.8–1.5)
DIFFERENTIAL METHOD BLD: ABNORMAL
EOSINOPHIL # BLD: 0.2 K/UL (ref 0–0.8)
EOSINOPHIL NFR BLD: 2 % (ref 0.5–7.8)
ERYTHROCYTE [DISTWIDTH] IN BLOOD BY AUTOMATED COUNT: 14.4 % (ref 11.9–14.6)
GLUCOSE SERPL-MCNC: 107 MG/DL (ref 65–100)
HCT VFR BLD AUTO: 32.6 % (ref 41.1–50.3)
HGB BLD-MCNC: 10.9 G/DL (ref 13.6–17.2)
INR PPP: 2.2 (ref 0.9–1.2)
LYMPHOCYTES # BLD: 1.1 K/UL (ref 0.5–4.6)
LYMPHOCYTES NFR BLD: 12 % (ref 13–44)
MCH RBC QN AUTO: 32.8 PG (ref 26.1–32.9)
MCHC RBC AUTO-ENTMCNC: 33.4 G/DL (ref 31.4–35)
MCV RBC AUTO: 98.2 FL (ref 79.6–97.8)
MONOCYTES # BLD: 0.9 K/UL (ref 0.1–1.3)
MONOCYTES NFR BLD: 10 % (ref 4–12)
NEUTS SEG # BLD: 7.1 K/UL (ref 1.7–8.2)
NEUTS SEG NFR BLD: 75 % (ref 43–78)
PLATELET # BLD AUTO: 302 K/UL (ref 150–450)
PMV BLD AUTO: 10.2 FL (ref 10.8–14.1)
POTASSIUM SERPL-SCNC: 4.5 MMOL/L (ref 3.5–5.1)
PROTHROMBIN TIME: 22.3 SEC (ref 9.6–12)
RBC # BLD AUTO: 3.32 M/UL (ref 4.23–5.67)
SODIUM SERPL-SCNC: 141 MMOL/L (ref 136–145)
WBC # BLD AUTO: 9.4 K/UL (ref 4.3–11.1)

## 2017-11-02 PROCEDURE — 80048 BASIC METABOLIC PNL TOTAL CA: CPT | Performed by: INTERNAL MEDICINE

## 2017-11-02 PROCEDURE — 85025 COMPLETE CBC W/AUTO DIFF WBC: CPT | Performed by: INTERNAL MEDICINE

## 2017-11-02 PROCEDURE — 85610 PROTHROMBIN TIME: CPT | Performed by: INTERNAL MEDICINE

## 2017-11-02 PROCEDURE — 3331090002 HH PPS REVENUE DEBIT

## 2017-11-02 PROCEDURE — 36415 COLL VENOUS BLD VENIPUNCTURE: CPT | Performed by: INTERNAL MEDICINE

## 2017-11-02 PROCEDURE — 3331090001 HH PPS REVENUE CREDIT

## 2017-11-03 PROCEDURE — 3331090002 HH PPS REVENUE DEBIT

## 2017-11-03 PROCEDURE — 3331090001 HH PPS REVENUE CREDIT

## 2017-11-04 PROCEDURE — 3331090001 HH PPS REVENUE CREDIT

## 2017-11-04 PROCEDURE — 3331090002 HH PPS REVENUE DEBIT

## 2017-11-05 ENCOUNTER — ANESTHESIA EVENT (OUTPATIENT)
Dept: SURGERY | Age: 82
End: 2017-11-05
Payer: MEDICARE

## 2017-11-05 PROCEDURE — 3331090001 HH PPS REVENUE CREDIT

## 2017-11-05 PROCEDURE — 3331090002 HH PPS REVENUE DEBIT

## 2017-11-06 ENCOUNTER — ANESTHESIA (OUTPATIENT)
Dept: SURGERY | Age: 82
End: 2017-11-06
Payer: MEDICARE

## 2017-11-06 ENCOUNTER — HOSPITAL ENCOUNTER (OUTPATIENT)
Age: 82
Setting detail: OBSERVATION
Discharge: HOME | End: 2017-11-07
Attending: INTERNAL MEDICINE | Admitting: INTERNAL MEDICINE
Payer: MEDICARE

## 2017-11-06 ENCOUNTER — APPOINTMENT (OUTPATIENT)
Dept: GENERAL RADIOLOGY | Age: 82
End: 2017-11-06
Attending: INTERNAL MEDICINE
Payer: MEDICARE

## 2017-11-06 ENCOUNTER — APPOINTMENT (OUTPATIENT)
Dept: CARDIAC CATH/INVASIVE PROCEDURES | Age: 82
End: 2017-11-06
Payer: MEDICARE

## 2017-11-06 PROBLEM — I25.5 ISCHEMIC CARDIOMYOPATHY: Status: ACTIVE | Noted: 2017-11-06

## 2017-11-06 LAB
ATRIAL RATE: 44 BPM
ATRIAL RATE: 83 BPM
CALCULATED R AXIS, ECG10: 120 DEGREES
CALCULATED R AXIS, ECG10: 155 DEGREES
CALCULATED T AXIS, ECG11: -30 DEGREES
CALCULATED T AXIS, ECG11: 85 DEGREES
DIAGNOSIS, 93000: NORMAL
DIAGNOSIS, 93000: NORMAL
GLUCOSE BLD STRIP.AUTO-MCNC: 174 MG/DL (ref 65–100)
GLUCOSE BLD STRIP.AUTO-MCNC: 251 MG/DL (ref 65–100)
GLUCOSE BLD STRIP.AUTO-MCNC: 69 MG/DL (ref 65–100)
GLUCOSE BLD STRIP.AUTO-MCNC: 84 MG/DL (ref 65–100)
Q-T INTERVAL, ECG07: 484 MS
Q-T INTERVAL, ECG07: 500 MS
QRS DURATION, ECG06: 142 MS
QRS DURATION, ECG06: 142 MS
QTC CALCULATION (BEZET), ECG08: 495 MS
QTC CALCULATION (BEZET), ECG08: 500 MS
VENTRICULAR RATE, ECG03: 60 BPM
VENTRICULAR RATE, ECG03: 63 BPM

## 2017-11-06 PROCEDURE — 77030014650 HC SEAL MTRX FLOSEL BAXT -C

## 2017-11-06 PROCEDURE — 99218 HC RM OBSERVATION: CPT

## 2017-11-06 PROCEDURE — 77030028698 HC BLD TISS PLSM MEDT -D

## 2017-11-06 PROCEDURE — 77030008467 HC STPLR SKN COVD -B

## 2017-11-06 PROCEDURE — 74011000272 HC RX REV CODE- 272: Performed by: INTERNAL MEDICINE

## 2017-11-06 PROCEDURE — 76060000034 HC ANESTHESIA 1.5 TO 2 HR: Performed by: INTERNAL MEDICINE

## 2017-11-06 PROCEDURE — 93005 ELECTROCARDIOGRAM TRACING: CPT | Performed by: INTERNAL MEDICINE

## 2017-11-06 PROCEDURE — 74011250636 HC RX REV CODE- 250/636: Performed by: ANESTHESIOLOGY

## 2017-11-06 PROCEDURE — 74011000250 HC RX REV CODE- 250: Performed by: INTERNAL MEDICINE

## 2017-11-06 PROCEDURE — 77030012935 HC DRSG AQUACEL BMS -B

## 2017-11-06 PROCEDURE — 74011636637 HC RX REV CODE- 636/637: Performed by: INTERNAL MEDICINE

## 2017-11-06 PROCEDURE — 74011250636 HC RX REV CODE- 250/636: Performed by: INTERNAL MEDICINE

## 2017-11-06 PROCEDURE — 3331090002 HH PPS REVENUE DEBIT

## 2017-11-06 PROCEDURE — 77030019557 HC ELECTRD VES SEAL MEDT -F

## 2017-11-06 PROCEDURE — 33264 RMVL & RPLCMT DFB GEN MLT LD: CPT

## 2017-11-06 PROCEDURE — 77030037029 HC IMPL ENV ICD ANTIBACT ABSRB TYRX MEDT -G

## 2017-11-06 PROCEDURE — 82962 GLUCOSE BLOOD TEST: CPT

## 2017-11-06 PROCEDURE — 74011250636 HC RX REV CODE- 250/636

## 2017-11-06 PROCEDURE — 3331090001 HH PPS REVENUE CREDIT

## 2017-11-06 PROCEDURE — 71010 XR CHEST PORT: CPT

## 2017-11-06 PROCEDURE — C1882 AICD, OTHER THAN SING/DUAL: HCPCS

## 2017-11-06 RX ORDER — SODIUM CHLORIDE 0.9 % (FLUSH) 0.9 %
5-10 SYRINGE (ML) INJECTION AS NEEDED
Status: DISCONTINUED | OUTPATIENT
Start: 2017-11-06 | End: 2017-11-06

## 2017-11-06 RX ORDER — INSULIN LISPRO 100 [IU]/ML
6 INJECTION, SOLUTION INTRAVENOUS; SUBCUTANEOUS
Status: DISCONTINUED | OUTPATIENT
Start: 2017-11-07 | End: 2017-11-07 | Stop reason: HOSPADM

## 2017-11-06 RX ORDER — DIPHENHYDRAMINE HYDROCHLORIDE 50 MG/ML
12.5 INJECTION, SOLUTION INTRAMUSCULAR; INTRAVENOUS ONCE
Status: DISCONTINUED | OUTPATIENT
Start: 2017-11-06 | End: 2017-11-06

## 2017-11-06 RX ORDER — HYDROCODONE BITARTRATE AND ACETAMINOPHEN 5; 325 MG/1; MG/1
1 TABLET ORAL
Status: DISCONTINUED | OUTPATIENT
Start: 2017-11-06 | End: 2017-11-07 | Stop reason: HOSPADM

## 2017-11-06 RX ORDER — INSULIN LISPRO 100 [IU]/ML
INJECTION, SOLUTION INTRAVENOUS; SUBCUTANEOUS
Status: DISCONTINUED | OUTPATIENT
Start: 2017-11-06 | End: 2017-11-07 | Stop reason: HOSPADM

## 2017-11-06 RX ORDER — OXYCODONE HYDROCHLORIDE 5 MG/1
5 TABLET ORAL
Status: DISCONTINUED | OUTPATIENT
Start: 2017-11-06 | End: 2017-11-06

## 2017-11-06 RX ORDER — MIDAZOLAM HYDROCHLORIDE 1 MG/ML
2 INJECTION, SOLUTION INTRAMUSCULAR; INTRAVENOUS ONCE
Status: DISCONTINUED | OUTPATIENT
Start: 2017-11-06 | End: 2017-11-06

## 2017-11-06 RX ORDER — CEFAZOLIN SODIUM IN 0.9 % NACL 2 G/50 ML
2 INTRAVENOUS SOLUTION, PIGGYBACK (ML) INTRAVENOUS EVERY 8 HOURS
Status: COMPLETED | OUTPATIENT
Start: 2017-11-06 | End: 2017-11-07

## 2017-11-06 RX ORDER — LANOLIN ALCOHOL/MO/W.PET/CERES
325 CREAM (GRAM) TOPICAL
Status: DISCONTINUED | OUTPATIENT
Start: 2017-11-07 | End: 2017-11-07 | Stop reason: HOSPADM

## 2017-11-06 RX ORDER — LISINOPRIL 2.5 MG/1
2.5 TABLET ORAL DAILY
Status: DISCONTINUED | OUTPATIENT
Start: 2017-11-07 | End: 2017-11-07 | Stop reason: HOSPADM

## 2017-11-06 RX ORDER — HYDROMORPHONE HYDROCHLORIDE 2 MG/ML
0.5 INJECTION, SOLUTION INTRAMUSCULAR; INTRAVENOUS; SUBCUTANEOUS
Status: DISCONTINUED | OUTPATIENT
Start: 2017-11-06 | End: 2017-11-06

## 2017-11-06 RX ORDER — LEVOTHYROXINE SODIUM 125 UG/1
125 TABLET ORAL
Status: DISCONTINUED | OUTPATIENT
Start: 2017-11-07 | End: 2017-11-07 | Stop reason: HOSPADM

## 2017-11-06 RX ORDER — LISINOPRIL 5 MG/1
2.5 TABLET ORAL DAILY
Status: DISCONTINUED | OUTPATIENT
Start: 2017-11-07 | End: 2017-11-06 | Stop reason: SDUPTHER

## 2017-11-06 RX ORDER — CEFAZOLIN SODIUM IN 0.9 % NACL 2 G/50 ML
2 INTRAVENOUS SOLUTION, PIGGYBACK (ML) INTRAVENOUS
Status: COMPLETED | OUTPATIENT
Start: 2017-11-06 | End: 2017-11-06

## 2017-11-06 RX ORDER — SODIUM CHLORIDE, SODIUM LACTATE, POTASSIUM CHLORIDE, CALCIUM CHLORIDE 600; 310; 30; 20 MG/100ML; MG/100ML; MG/100ML; MG/100ML
100 INJECTION, SOLUTION INTRAVENOUS CONTINUOUS
Status: DISCONTINUED | OUTPATIENT
Start: 2017-11-06 | End: 2017-11-06

## 2017-11-06 RX ORDER — SODIUM CHLORIDE 0.9 % (FLUSH) 0.9 %
5-10 SYRINGE (ML) INJECTION AS NEEDED
Status: DISCONTINUED | OUTPATIENT
Start: 2017-11-06 | End: 2017-11-07 | Stop reason: HOSPADM

## 2017-11-06 RX ORDER — MIDAZOLAM HYDROCHLORIDE 1 MG/ML
2 INJECTION, SOLUTION INTRAMUSCULAR; INTRAVENOUS
Status: DISCONTINUED | OUTPATIENT
Start: 2017-11-06 | End: 2017-11-06

## 2017-11-06 RX ORDER — ASPIRIN 81 MG/1
81 TABLET ORAL DAILY
Status: DISCONTINUED | OUTPATIENT
Start: 2017-11-07 | End: 2017-11-07 | Stop reason: HOSPADM

## 2017-11-06 RX ORDER — FUROSEMIDE 40 MG/1
40 TABLET ORAL 2 TIMES DAILY
Status: DISCONTINUED | OUTPATIENT
Start: 2017-11-06 | End: 2017-11-07 | Stop reason: HOSPADM

## 2017-11-06 RX ORDER — SODIUM CHLORIDE 0.9 % (FLUSH) 0.9 %
5-10 SYRINGE (ML) INJECTION EVERY 8 HOURS
Status: DISCONTINUED | OUTPATIENT
Start: 2017-11-06 | End: 2017-11-06

## 2017-11-06 RX ORDER — LIDOCAINE HYDROCHLORIDE 10 MG/ML
0.1 INJECTION INFILTRATION; PERINEURAL AS NEEDED
Status: DISCONTINUED | OUTPATIENT
Start: 2017-11-06 | End: 2017-11-06

## 2017-11-06 RX ORDER — OXYCODONE AND ACETAMINOPHEN 5; 325 MG/1; MG/1
1 TABLET ORAL AS NEEDED
Status: DISCONTINUED | OUTPATIENT
Start: 2017-11-06 | End: 2017-11-06

## 2017-11-06 RX ORDER — FENTANYL CITRATE 50 UG/ML
25 INJECTION, SOLUTION INTRAMUSCULAR; INTRAVENOUS ONCE
Status: DISCONTINUED | OUTPATIENT
Start: 2017-11-06 | End: 2017-11-06

## 2017-11-06 RX ORDER — PROPOFOL 10 MG/ML
INJECTION, EMULSION INTRAVENOUS
Status: DISCONTINUED | OUTPATIENT
Start: 2017-11-06 | End: 2017-11-06 | Stop reason: HOSPADM

## 2017-11-06 RX ORDER — TAMSULOSIN HYDROCHLORIDE 0.4 MG/1
0.4 CAPSULE ORAL DAILY
Status: DISCONTINUED | OUTPATIENT
Start: 2017-11-07 | End: 2017-11-07 | Stop reason: HOSPADM

## 2017-11-06 RX ORDER — CEFAZOLIN SODIUM IN 0.9 % NACL 2 G/50 ML
2 INTRAVENOUS SOLUTION, PIGGYBACK (ML) INTRAVENOUS EVERY 8 HOURS
Status: DISCONTINUED | OUTPATIENT
Start: 2017-11-06 | End: 2017-11-06 | Stop reason: SDUPTHER

## 2017-11-06 RX ORDER — FAMOTIDINE 20 MG/1
20 TABLET, FILM COATED ORAL ONCE
Status: DISCONTINUED | OUTPATIENT
Start: 2017-11-06 | End: 2017-11-06

## 2017-11-06 RX ORDER — CARVEDILOL 6.25 MG/1
6.25 TABLET ORAL 2 TIMES DAILY WITH MEALS
Status: DISCONTINUED | OUTPATIENT
Start: 2017-11-06 | End: 2017-11-07 | Stop reason: HOSPADM

## 2017-11-06 RX ORDER — ACETAMINOPHEN 325 MG/1
650 TABLET ORAL
Status: DISCONTINUED | OUTPATIENT
Start: 2017-11-06 | End: 2017-11-07 | Stop reason: HOSPADM

## 2017-11-06 RX ORDER — INSULIN GLARGINE 100 [IU]/ML
16 INJECTION, SOLUTION SUBCUTANEOUS
Status: DISCONTINUED | OUTPATIENT
Start: 2017-11-06 | End: 2017-11-07 | Stop reason: HOSPADM

## 2017-11-06 RX ORDER — INSULIN LISPRO 100 [IU]/ML
4 INJECTION, SOLUTION INTRAVENOUS; SUBCUTANEOUS
Status: DISCONTINUED | OUTPATIENT
Start: 2017-11-07 | End: 2017-11-07 | Stop reason: HOSPADM

## 2017-11-06 RX ORDER — SIMVASTATIN 20 MG/1
20 TABLET, FILM COATED ORAL
Status: DISCONTINUED | OUTPATIENT
Start: 2017-11-06 | End: 2017-11-07 | Stop reason: HOSPADM

## 2017-11-06 RX ORDER — SODIUM CHLORIDE 0.9 % (FLUSH) 0.9 %
5-10 SYRINGE (ML) INJECTION EVERY 8 HOURS
Status: DISCONTINUED | OUTPATIENT
Start: 2017-11-06 | End: 2017-11-07 | Stop reason: HOSPADM

## 2017-11-06 RX ORDER — SODIUM CHLORIDE 9 MG/ML
50 INJECTION, SOLUTION INTRAVENOUS CONTINUOUS
Status: DISCONTINUED | OUTPATIENT
Start: 2017-11-06 | End: 2017-11-06

## 2017-11-06 RX ORDER — LIDOCAINE HYDROCHLORIDE AND EPINEPHRINE 10; 10 MG/ML; UG/ML
50 INJECTION, SOLUTION INFILTRATION; PERINEURAL ONCE
Status: COMPLETED | OUTPATIENT
Start: 2017-11-06 | End: 2017-11-06

## 2017-11-06 RX ADMIN — Medication 10 ML: at 22:00

## 2017-11-06 RX ADMIN — NEOMYCIN AND POLYMYXIN B SULFATES: 40; 200000 IRRIGANT IRRIGATION at 12:37

## 2017-11-06 RX ADMIN — PROPOFOL 100 MCG/KG/MIN: 10 INJECTION, EMULSION INTRAVENOUS at 12:25

## 2017-11-06 RX ADMIN — CEFAZOLIN 2 G: 1 INJECTION, POWDER, FOR SOLUTION INTRAMUSCULAR; INTRAVENOUS; PARENTERAL at 22:00

## 2017-11-06 RX ADMIN — INSULIN GLARGINE 16 UNITS: 100 INJECTION, SOLUTION SUBCUTANEOUS at 22:01

## 2017-11-06 RX ADMIN — SODIUM CHLORIDE, SODIUM LACTATE, POTASSIUM CHLORIDE, AND CALCIUM CHLORIDE: 600; 310; 30; 20 INJECTION, SOLUTION INTRAVENOUS at 12:14

## 2017-11-06 RX ADMIN — LIDOCAINE HYDROCHLORIDE,EPINEPHRINE BITARTRATE 500 MG: 10; .01 INJECTION, SOLUTION INFILTRATION; PERINEURAL at 12:37

## 2017-11-06 RX ADMIN — CEFAZOLIN 2 G: 1 INJECTION, POWDER, FOR SOLUTION INTRAMUSCULAR; INTRAVENOUS; PARENTERAL at 12:30

## 2017-11-06 NOTE — PROCEDURES
Procedure: BiV ICD Generator Removal/Replacement without DFTs    Pre-Procedure Diagnosis  1. Chronic systolic heart failure, ejection fraction of 47%. 2. Ischemic dilated cardiomyopathy. 3. Class III heart failure symptoms. 4. Chronic systolic heart failure for a minimum of 3 months duration on optimal medical therapy. 5. BiV ICD JAVID  6. Complete heart block  7. Pacemaker dependent    Procedure Performed  1. Replacement of Medtronic biventricular implantable cardioverter defibrillator. Anesthesia: MAC     Estimated Blood Loss: Less than 10 mL     Specimens: * No specimens in log *     Patient Information and Indications: The procedure, indications, risks, benefits, and alternatives were discussed with the patient and family members, who desired to proceed after questions were answered and informed consent was documented. Methods: After informed consent was obtained, the patient was brought to the Electrophysiology Laboratory in a fasting state and was prepped and draped in sterile fashion. Prophylactic antibiotic was administered prior to skin incision: (Ancef 2 gm). Conscious sedation was administered with continuous oxygen saturation measurement and blood pressure measurement by Anesthesia. Local anesthetic (lidocaine) was delivered to the left pectoral region and an incision was made parallel to the deltopectoral groove directly over the prior surgical scar. The subcutaneous pocket was opened using blunt dissection and electrocautery, and adequate hemostasis was established. The device was freed from overlying fibrotic tissue and the leads freed to give enough slack for device exchange. The leads were individually removed from the old generator and tested using the PSA revealing excellent pacing/sensing parameters. The lead pins were then cleaned with antibiotic soaked gauze, dried gently, and attached to a new biventricular ICD generator.  Pins were directly observed to pass the tip electrode, and the ring hex wrench screws were secured, and leads tug tested. The device and leads were gently positioned within the pocket. The pocket was irrigated copiously with a saline antimicrobial solution. The device was and leads were tested a second time prior to pocket closure. The wound was closed with multiple layers of absorbable suture followed by skin closure with staples. The patient tolerated the procedure well and left the lab in good condition. Lead Data:    Device and Lead Information  Pulse Generator Model #  Serial # Location Implant   Clau [place] [id number] [id number] Left Pectoral Implant  11-06-17   [id number] [id number] Left pectoral         Explant          5-13-13     Lead Model Number  Serial Number Lead position Implant   RA [id number] MDT [id number] RA Appendage Implant  11-14-11   RV [id number]  Pace/sense        MDT [id number] RV [place] Implant  11-14-11     LV   [id number]       SJM [id number]  5-13-13   RV [id number]  HV MDT [id number] RV apex Implant  5-13-13     Lead Sensitivity and Threshold  Lead R or P sensitivity (mv) Threshold (V) Threshold PW (msec) Impedance (ohms) Final output Voltage (V) Final PW (msec)   RA OFF     . RV [email or web address] .75 .5 418 2.0 .5   LV  1.0 .5 608 1.5  LVRing-RVcoil . 5     Bradycardia Settings  Ariel Mode LRL URL Pace AVD (ms) Sense AVD (ms) Rate Response Mode Switching Mode SW Rate   VVIR 60 120   Med/low         Tachycardia Settings  Zone Type VT-1 FVT-via VF VF   ON/OFF/  MONITOR           monitor  ON   Zone Rate             150 188-214              188   1st Therapy Type                  Burstx3  Shock   Energy (J)                35   2nd Therapy Type           Shock Shock   Energy (J)             35 35   3rd Therapy Type  shock Shock   Energy (J)  35 35   4th Therapy Type  Shock Shock   Energy (J)  35 35   5th Therapy Type  Shock Shock   Energy (J)  35 35   6th Therapy Type  Shock Shock   Energy (J)  35 35     Defibrillation Threshold Testing  DFT# How induced Successful test? Shock Imped (ohms) Energy (J) Charge time (sec) Rescue needed? Defib threshold (J)   n/a                      Contrast: n/a    Fluoro Time:   n/a    Complications: None    IMPRESSION: 1) Successful BiV ICD generator replacement without DFTs. Mango Stephenson MD, MS  Clinical Cardiac Electrophysiology  11/6/2017  1:50 PM

## 2017-11-06 NOTE — IP AVS SNAPSHOT
[address] 
[phone] Patient: Jovany Tomlinson MRN: [id number] :1935 My Medications TAKE these medications as instructed Instructions Each Dose to Equal  
 Morning Noon Evening Bedtime  
 aspirin delayed-release 81 mg tablet Take 81 mg by mouth daily. 81 mg  
    
  
   
   
   
  
 carvedilol 6.25 mg tablet Commonly known as:  Etta Chew Take 1 Tab by mouth two (2) times daily (with meals). 6.25 mg  
    
  
   
   
  
   
  
 clobetasol 0.05 % ointment Commonly known as:  Beola Earl Apply  to affected area two (2) times a day. furosemide 40 mg tablet Commonly known as:  LASIX Take 1 Tab by mouth two (2) times a day. 40 mg  
    
  
   
   
  
   
  
 GLUCOSAMINE CHONDROITIN PLUS PO Take 2 Caplet by mouth daily. 2 Caplet  
    
  
   
   
   
  
 glucose blood VI test strips strip Commonly known as:  ONETOUCH ULTRA TEST  
   
 TEST BS TID AM DX E11.22 Insulin Needles (Disposable) 31 gauge x 3/16\" Ndle Commonly known as:  BD INSULIN PEN NEEDLE UF MINI Use as directed, E11.9 Iron 325 mg (65 mg iron) tablet Generic drug:  ferrous sulfate Take 65 mg by mouth Daily (before breakfast). 65 mg  
    
   
   
   
  
 * lancets 30 gauge Misc Commonly known as:  Ele George LANCETS Testing TID/PRN, E11.9  
     
   
   
   
  
 * lancets 33 gauge Misc Test BS TID/PRN Dx E11.9. Pt uses one touch delica 33 gauge lancets LANTUS 100 unit/mL injection Generic drug:  insulin glargine 16 Units by SubCUTAneous route. As directed 16 Units  
    
  
   
   
   
  
 levothyroxine 125 mcg tablet Commonly known as:  SYNTHROID Take 1 Tab by mouth Daily (before breakfast). 125 mcg lisinopril 2.5 mg tablet Commonly known as:  ZESTRIL Take 1 Tab by mouth daily. 2.5 mg  
    
  
   
   
   
  
 metOLazone 2.5 mg tablet Commonly known as:  Susan Ripper Take 1 Tab by mouth as needed. 2.5 mg  
    
   
   
   
  
 multivitamin tablet Commonly known as:  ONE A DAY Take 1 Tab by mouth daily. 1 Tab NovoLOG 100 unit/mL injection Generic drug:  insulin aspart  
   
 by SubCUTAneous route as needed. omeprazole 20 mg capsule Commonly known as:  PRILOSEC Take 1 capsule by mouth  daily  
     
  
   
   
   
  
 potassium chloride 20 mEq tablet Commonly known as:  K-DUR, KLOR-CON Take 1 tablet by mouth  daily  
     
  
   
   
   
  
 simvastatin 20 mg tablet Commonly known as:  ZOCOR Take 1 tablet by mouth  nightly  
     
   
   
   
  
  
 tamsulosin 0.4 mg capsule Commonly known as:  FLOMAX TAKE 1 CAPSULE BY MOUTH EVERY DAY  
     
  
   
   
   
  
 traMADol-acetaminophen 37.5-325 mg per tablet Commonly known as:  ULTRACET Take 1 Tab by mouth every six (6) hours as needed for Pain. Max Daily Amount: 4 Tabs. 1 Tab  
    
   
   
   
  
 warfarin 5 mg tablet Commonly known as:  COUMADIN  
   
 1 to 1&1/2 tabs every day or as directed * Notice: This list has 2 medication(s) that are the same as other medications prescribed for you. Read the directions carefully, and ask your doctor or other care provider to review them with you.

## 2017-11-06 NOTE — ANESTHESIA PREPROCEDURE EVALUATION
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 Eldon Nguyễn           Physical Exam    Airway  Mallampati: II  TM Distance: 4 - 6 cm  Neck ROM: normal range of motion   Mouth opening: Normal     Cardiovascular  Regular rate and rhythm,  S1 and S2 normal,  no murmur, click, rub, or gallop  Rhythm: regular  Rate: normal         Dental  No notable dental hx       Pulmonary  Breath sounds clear to auscultation               Abdominal  GI exam deferred       Other Findings            Anesthetic Plan    ASA: 3  Anesthesia type: MAC          Induction: Intravenous  Anesthetic plan and risks discussed with: Patient

## 2017-11-06 NOTE — ANESTHESIA POSTPROCEDURE EVALUATION
Post-Anesthesia Evaluation and Assessment    Patient: Sosa Huang MRN: [id number]  SSN: [id number]    YOB: 1935  Age: [de-identified] y.o. Sex: male       Cardiovascular Function/Vital Signs  Visit Vitals    /64    Pulse 60    Temp 36.4 °C (97.6 °F)    Resp 9    Ht 5' 10.5\" (1.791 m)    Wt 99.8 kg (220 lb)    SpO2 98%    BMI 31.12 kg/m2       Patient is status post MAC anesthesia for Procedure(s):  BIV ICD GEN CHANGE. Nausea/Vomiting: None    Postoperative hydration reviewed and adequate. Pain:  Pain Scale 1: Numeric (0 - 10) (11/06/17 1120)  Pain Intensity 1: 0 (11/06/17 1120)   Managed    Neurological Status: At baseline    Mental Status and Level of Consciousness: Arousable    Pulmonary Status:   O2 Device: Room air (11/06/17 1430)   Adequate oxygenation and airway patent    Complications related to anesthesia: None    Post-anesthesia assessment completed.  No concerns    Signed By: Ashley Thompson MD     November 6, 2017

## 2017-11-06 NOTE — IP AVS SNAPSHOT
Reji Ego 
 
 
 [address] [facility] 
[phone] Patient: Alice Shine MRN: [id number] :[id number] About your hospitalization You were admitted on:  2017 You last received care in the:  [facility] 3 TELEMETRY You were discharged on:  2017 Why you were hospitalized Your primary diagnosis was:  Not on File Your diagnoses also included:  Ischemic Cardiomyopathy Things You Need To Do (next 8 weeks) Follow up with [address] Follow-up in the device clinic on 17 at 9:00am in the [place] office Phone:  [phone] Where:  [address] [place] [address],  [address] [place] [phone] Schedule an appointment with Anika Crockett MD as soon as possible for a visit today As needed Phone:  [phone] Where:  [address] [address]  PTA HOME VISIT with Mulu Crenshaw at 11:00 AM  
Where:  [address] [place] [address] ([address] [place] [address])  PT DISCHARGE with Syed Lara PT at 11:00 AM  
Where:  [address] [place] [address] ([address] [place] [address])  LAB with [facility] at 11:00 AM  
Where:  [facility] ([facility])  OFFICE DEVICE CHECKS with GVLLE DEVICE 39 at  9:00 AM  
This upcoming device check will take place IN OUR OFFICE. Please arrive 15 minutes early to review any necessary paperwork requirements. If you have any further questions or need to change this appointment, we are happy to help and can be reached at [phone]. Where:  [place] OFFICE ([facility]) Follow Up with Anika Crockett MD at 11:00 AM  
Where:  [facility] ([address]) Discharge Orders None A check shawnee indicates which time of day the medication should be taken. My Medications TAKE these medications as instructed Instructions Each Dose to Equal  
 Morning Noon Evening Bedtime  
 aspirin delayed-release 81 mg tablet Take 81 mg by mouth daily. 81 mg  
    
  
   
   
   
  
 carvedilol 6.25 mg tablet Commonly known as:  Dodson Servant Take 1 Tab by mouth two (2) times daily (with meals). 6.25 mg  
    
  
   
   
  
   
  
 clobetasol 0.05 % ointment Commonly known as:  Altagracia Plume Apply  to affected area two (2) times a day. furosemide 40 mg tablet Commonly known as:  LASIX Take 1 Tab by mouth two (2) times a day. 40 mg  
    
  
   
   
  
   
  
 GLUCOSAMINE CHONDROITIN PLUS PO Take 2 Caplet by mouth daily. 2 Caplet  
    
  
   
   
   
  
 glucose blood VI test strips strip Commonly known as:  ONETOUCH ULTRA TEST  
   
 TEST BS TID AM DX E11.22 Insulin Needles (Disposable) 31 gauge x 3/16\" Ndle Commonly known as:  BD INSULIN PEN NEEDLE UF MINI Use as directed, E11.9 Iron 325 mg (65 mg iron) tablet Generic drug:  ferrous sulfate Take 65 mg by mouth Daily (before breakfast). 65 mg  
    
   
   
   
  
 * lancets 30 gauge Misc Commonly known as:  Rosibel Belle LANCETS Testing TID/PRN, E11.9  
     
   
   
   
  
 * lancets 33 gauge Misc Test BS TID/PRN Dx E11.9. Pt uses one touch delica 33 gauge lancets LANTUS 100 unit/mL injection Generic drug:  insulin glargine 16 Units by SubCUTAneous route. As directed 16 Units  
    
  
   
   
   
  
 levothyroxine 125 mcg tablet Commonly known as:  SYNTHROID Take 1 Tab by mouth Daily (before breakfast). 125 mcg  
    
  
   
   
   
  
 lisinopril 2.5 mg tablet Commonly known as:  ZESTRIL  
 Take 1 Tab by mouth daily. 2.5 mg  
    
  
   
   
   
  
 metOLazone 2.5 mg tablet Commonly known as:  Twilla Mingle Take 1 Tab by mouth as needed. 2.5 mg  
    
   
   
   
  
 multivitamin tablet Commonly known as:  ONE A DAY Take 1 Tab by mouth daily. 1 Tab NovoLOG 100 unit/mL injection Generic drug:  insulin aspart  
   
 by SubCUTAneous route as needed. omeprazole 20 mg capsule Commonly known as:  PRILOSEC Take 1 capsule by mouth  daily  
     
  
   
   
   
  
 potassium chloride 20 mEq tablet Commonly known as:  K-DUR, KLOR-CON Take 1 tablet by mouth  daily  
     
  
   
   
   
  
 simvastatin 20 mg tablet Commonly known as:  ZOCOR Take 1 tablet by mouth  nightly  
     
   
   
   
  
  
 tamsulosin 0.4 mg capsule Commonly known as:  FLOMAX TAKE 1 CAPSULE BY MOUTH EVERY DAY  
     
  
   
   
   
  
 traMADol-acetaminophen 37.5-325 mg per tablet Commonly known as:  ULTRACET Take 1 Tab by mouth every six (6) hours as needed for Pain. Max Daily Amount: 4 Tabs. 1 Tab  
    
   
   
   
  
 warfarin 5 mg tablet Commonly known as:  COUMADIN  
   
 1 to 1&1/2 tabs every day or as directed * Notice: This list has 2 medication(s) that are the same as other medications prescribed for you. Read the directions carefully, and ask your doctor or other care provider to review them with you. Discharge Instructions Learning About a Pacemaker for Heart Failure What is a pacemaker for heart failure? A pacemaker is a small device that is placed under the skin of your chest. It is powered by batteries. It has thin wires, called leads, that pass through a vein into your heart. A pacemaker for heart failure is a biventricular pacemaker (say \"liliam-nereida-TRICK-shy-princess\").  Treatment that uses this type of pacemaker is called cardiac resynchronization therapy (CRT). When you have heart failure, the lower chambers of your heart may not pump at the same time. The pacemaker sends painless electrical signals to your heart. These signals make the chambers pump at the same time. This can help your heart pump blood better and help you feel better. Your pacemaker may be combined with an ICD, or implantable cardioverter-defibrillator. It can control abnormal heart rhythms. This can prevent sudden death. You may feel worried about having a pacemaker. This is common. It can help if you learn about how the pacemaker helps your heart. Talk to your doctor about your concerns. How is a pacemaker put in place? You will get medicine before the procedure. This helps you relax and helps prevent pain. The doctor makes a cut in the skin just below your collarbone. The cut may be on either side of your chest. The doctor will put the pacemaker leads through the cut. The leads go into a large blood vessel in the upper chest. Then the doctor will guide the leads through the blood vessel into different chambers of the heart. The doctor will place the pacemaker under the skin of your chest. He or she will attach the leads to the pacemaker. Then the cut will be closed with stitches. The procedure usually takes 2 to 3 hours. You may need to spend the night in the hospital. 
What can you expect when you have a pacemaker? A pacemaker can help your heart pump blood better. It may help you feel better so you can be more active. It also may help keep you out of the hospital and help you live longer. You can live a normal, active life with a pacemaker. But you need to avoid strong magnetic and electrical fields. Your doctor or the maker of your pacemaker can give you a full list of things to avoid. But most everyday appliances are safe.  
Your doctor will check your pacemaker regularly to make sure it's working right. Pacemaker batteries usually last 5 to 15 years before they need to be replaced. Follow-up care is a key part of your treatment and safety. Be sure to make and go to all appointments, and call your doctor if you are having problems. It's also a good idea to know your test results and keep a list of the medicines you take. Where can you learn more? Go to http://ashtyn-ramon.info/. Enter Y169 in the search box to learn more about \"Learning About a Pacemaker for Heart Failure. \" Current as of: September 21, 2016 Content Version: 11.4 © 2656-1695 [employer]. Care instructions adapted under license by [employer] (which disclaims liability or warranty for this information). If you have questions about a medical condition or this instruction, always ask your healthcare professional. [address] any warranty or liability for your use of this information. Implantable Cardioverter-Defibrillator Placement: What to Expect at Home Your Recovery Implantable cardioverter-defibrillator (ICD) placement is surgery to put an ICD in your chest. An ICD is a small, battery-powered device that fixes life-threatening changes in your heartbeat. If the ICD detects a life-threatening rapid heart rhythm, it tries to slow the rhythm to get it back to normal. If the dangerous rhythm does not stop, the ICD sends an electric shock to the heart to restore a normal rhythm. The device then goes back to its watchful mode. Your chest may be sore where the doctor made the cut (incision) and put in the ICD. You also may have a bruise and mild swelling. These symptoms usually get better in 1 to 2 weeks. You may feel a hard ridge along the incision. This usually gets softer in the months after surgery. You probably will be able to see and feel the outline of the ICD under your skin.  
You will probably be able to go back to work or your usual routine 1 to 2 weeks after surgery. Your doctor will talk to you about how often you will need to have the ICD checked. When you have an ICD, it is important to avoid electrical devices that can stop your ICD from working right. Check with your doctor about what you need to stay away from, what you need to use with care, and what is okay to use. You will need to stay away from things with strong magnetic and electrical fields such as MRI machines (unless your ICD is safe for an MRI), welding equipment, and power generators. You can use a cell phone, but keep it at least 6 inches away from your ICD. You can safely use most household and office electronics. These include kitchen appliances, electric power tools, and computers. This care sheet gives you a general idea about how long it will take for you to recover. But each person recovers at a different pace. Follow the steps below to get better as quickly as possible. How can you care for yourself at home? Activity ? · Rest when you feel tired. Getting enough sleep will help you recover. ? · Try to walk each day. Start by walking a little more than you did the day before. Bit by bit, increase the amount you walk. Walking boosts blood flow and helps prevent pneumonia and constipation. ? · For 4 to 6 weeks: ¨ Avoid activities that strain your chest or upper arm muscles. This includes pushing a  or vacuum, mopping floors, swimming, or swinging a golf club or tennis racquet. ¨ Do not raise your arm, on the side of your body where the ICD is located, above shoulder level. ¨ Avoid strenuous activities, such as bicycle riding, jogging, weight lifting, or heavy aerobic exercise. ¨ Avoid lifting anything that would make you strain. This may include heavy grocery bags and milk containers, a heavy briefcase or backpack, cat litter or dog food bags, or a child. ? · Ask your doctor when you can drive again. ? · You will probably need to take about 1 to 2 weeks off from work. It depends on the type of work you do and how you feel. ? · Ask your doctor when it is okay for you to have sex. Diet ? · You can eat your normal diet. If your stomach is upset, try bland, low-fat foods like plain rice, broiled chicken, toast, and yogurt. ? · Drink plenty of fluids (unless your doctor tells you not to). Medicines ? · Your doctor will tell you if and when you can restart your medicines. He or she will also give you instructions about taking any new medicines. ? · If you take blood thinners, such as warfarin (Coumadin), clopidogrel (Plavix), or aspirin, be sure to talk to your doctor. He or she will tell you if and when to start taking those medicines again. Make sure that you understand exactly what your doctor wants you to do. ? · Take pain medicines exactly as directed. ¨ If the doctor gave you a prescription medicine for pain, take it as prescribed. ¨ If you are not taking a prescription pain medicine, ask your doctor if you can take an over-the-counter medicine. ¨ Do not take aspirin, ibuprofen (Advil, Motrin), naproxen (Aleve), or other nonsteroidal anti-inflammatory drugs (NSAIDs) unless your doctor says it is okay. ? · If you think your pain medicine is making you sick to your stomach: 
¨ Take your medicine after meals (unless your doctor has told you not to). ¨ Ask your doctor for a different pain medicine. ? · If your doctor prescribed antibiotics, take them as directed. Do not stop taking them just because you feel better. You need to take the full course of antibiotics. Incision care ? · Keep the area clean and dry. ? · Ask your doctor when you can shower or take a bath. ? · If you have strips of tape on the incision, leave the tape on for a week or until it falls off.  
? · Wash the area daily with warm, soapy water, and pat it dry.  Don't use hydrogen peroxide or alcohol, which can slow healing. You may cover the area with a gauze bandage if it weeps or rubs against clothing. Exercise ? · Check with your doctor before you start an exercise program. Your doctor may recommend that you work with a physical therapist to learn how to exercise safely with an ICD. Other instructions ? · Carry your ICD identification card with you at all times. The card should include the ICD  and model information. ? · Wear medical alert jewelry that states you have an ICD. You can buy this at most [employer]. ? · Have your ICD checked as often as your doctor recommends. In some cases, this may be done over the phone or the Internet. Theron Dixon will give you instructions about how to do this. ? · Talk with your doctor about the possibility of turning off the ICD at the end of life. You can put your wishes about the ICD in an advance directive. Follow-up care is a key part of your treatment and safety. Be sure to make and go to all appointments, and call your doctor if you are having problems. It's also a good idea to know your test results and keep a list of the medicines you take. When should you call for help? Call 911 anytime you think you may need emergency care. For example, call if: 
? · You passed out (lost consciousness). ? · You have trouble breathing. ?Call your doctor now or seek immediate medical care if: 
? · You receive a shock from your ICD. ? · You are dizzy or lightheaded, or you feel like you may faint. ? · You have pain that does not get better after you take pain medicine. ? · You hear an alarm or feel a vibration from your ICD. ? · You have loose stitches, or your incision comes open. ? · Bright red blood has soaked through the bandage over your incision. ? · You have signs of infection, such as: 
¨ Increased pain, swelling, warmth, or redness. ¨ Red streaks leading from the incision. ¨ Pus draining from the incision. ¨ A fever. ? Watch closely for changes in your health, and be sure to contact your doctor if you have any problems. Where can you learn more? Go to http://ashtyn-ramon.info/. Enter K184 in the search box to learn more about \"Implantable Cardioverter-Defibrillator Placement: What to Expect at Home. \" Current as of: September 21, 2016 Content Version: 11.4 © 6358-1944 [employer]. Care instructions adapted under license by [employer] (which disclaims liability or warranty for this information). If you have questions about a medical condition or this instruction, always ask your healthcare professional. [place] [address] any warranty or liability for your use of this information. DISCHARGE SUMMARY from Nurse PATIENT INSTRUCTIONS: 
 
After general anesthesia or intravenous sedation, for 24 hours or while taking prescription Narcotics: · Limit your activities · Do not drive and operate hazardous machinery · Do not make important personal or business decisions · Do  not drink alcoholic beverages · If you have not urinated within 8 hours after discharge, please contact your surgeon on call. Report the following to your surgeon: 
· Excessive pain, swelling, redness or odor of or around the surgical area · Temperature over 100.5 · Nausea and vomiting lasting longer than 4 hours or if unable to take medications · Any signs of decreased circulation or nerve impairment to extremity: change in color, persistent  numbness, tingling, coldness or increase pain · Any questions What to do at Home: 
Recommended activity: Activity as tolerated and No driving for 2 weeks Patient has been instructed to keep affected arm below shoulder level for the next 4 weeks or until cleared by doctor. The arm sling should be worn while sleeping. The dressing will be removed at folllow-up appointment. The incision site must be kept clean and dry. The patient may shower in a few days. Lotions, powders, or creams should be avoided as these can increase the risk of infection. The affected arm should not be used for any pushing, pulling, or lifting until cleared by doctor. Driving is prohibited until cleared by doctor in a follow up appointment. Any signs of infection including increased redness, suspicious drainage, or unexplained fever should be reported to the doctor immediately by calling [phone]. *  Please give a list of your current medications to your Primary Care Provider. *  Please update this list whenever your medications are discontinued, doses are 
    changed, or new medications (including over-the-counter products) are added. *  Please carry medication information at all times in case of emergency situations. These are general instructions for a healthy lifestyle: No smoking/ No tobacco products/ Avoid exposure to second hand smoke Surgeon General's Warning:  Quitting smoking now greatly reduces serious risk to your health. Obesity, smoking, and sedentary lifestyle greatly increases your risk for illness A healthy diet, regular physical exercise & weight monitoring are important for maintaining a healthy lifestyle You may be retaining fluid if you have a history of heart failure or if you experience any of the following symptoms:  Weight gain of 3 pounds or more overnight or 5 pounds in a week, increased swelling in our hands or feet or shortness of breath while lying flat in bed. Please call your doctor as soon as you notice any of these symptoms; do not wait until your next office visit. Recognize signs and symptoms of STROKE: 
 
F-face looks uneven A-arms unable to move or move unevenly S-speech slurred or non-existent T-time-call 911 as soon as signs and symptoms begin-DO NOT go Back to bed or wait to see if you get better-TIME IS BRAIN. Warning Signs of HEART ATTACK Call 911 if you have these symptoms: 
? Chest discomfort. Most heart attacks involve discomfort in the center of the chest that lasts more than a few minutes, or that goes away and comes back. It can feel like uncomfortable pressure, squeezing, fullness, or pain. ? Discomfort in other areas of the upper body. Symptoms can include pain or discomfort in one or both arms, the back, neck, jaw, or stomach. ? Shortness of breath with or without chest discomfort. ? Other signs may include breaking out in a cold sweat, nausea, or lightheadedness. Don't wait more than five minutes to call [address]! Fast action can save your life. Calling 911 is almost always the fastest way to get lifesaving treatment. Emergency Medical Services staff can begin treatment when they arrive  up to an hour sooner than if someone gets to the hospital by car. The discharge information has been reviewed with the patient and spouse. The patient and spouse verbalized understanding. Discharge medications reviewed with the patient and spouse and appropriate educational materials and side effects teaching were provided. ___________________________________________________________________________________________________________________________________ Firethorn Announcement We are excited to announce that we are making your provider's discharge notes available to you in Fire[employer]. You will see these notes when they are completed and signed by the physician that discharged you from your recent hospital stay. If you have any questions or concerns about any information you see in Front Appt, please call the Health Information Department where you were seen or reach out to your Primary Care Provider for more information about your plan of care. Introducing [facility] SERVICES!    
 Sydnee Ceja introduces Firethorn patient portal. Now you can access parts of your medical record, email your doctor's office, and request medication refills online. 1. In your internet browser, go to [email or web address] 2. Click on the First Time User? Click Here link in the Sign In box. You will see the New Member Sign Up page. 3. Enter your CGTrader Access Code exactly as it appears below. You will not need to use this code after youve completed the sign-up process. If you do not sign up before the expiration date, you must request a new code. · CGTrader Access Code: [email or web address] Expires: 1/15/2018  2:13 PM 
 
4. Enter the last four digits of your Social Security Number (xxxx) and Date of Birth (mm/dd/yyyy) as indicated and click Submit. You will be taken to the next sign-up page. 5. Create a CGTrader ID. This will be your CGTrader login ID and cannot be changed, so think of one that is secure and easy to remember. 6. Create a CGTrader password. You can change your password at any time. 7. Enter your Password Reset Question and Answer. This can be used at a later time if you forget your password. 8. Enter your e-mail address. You will receive e-mail notification when new information is available in [address] 9. Click Sign Up. You can now view and download portions of your medical record. 10. Click the Download Summary menu link to download a portable copy of your medical information. If you have questions, please visit the Frequently Asked Questions section of the CGTrader website. Remember, CGTrader is NOT to be used for urgent needs. For medical emergencies, dial 911. Now available from your iPhone and Android! Providers Seen During Your Hospitalization Provider Specialty Primary office phone Brina Li MD Cardiology [phone] Your Primary Care Physician (PCP) Primary Care Physician Office Phone Office Fax Marileegeorgie Maria Fernanda [phone] You are allergic to the following Allergen Reactions Latex Unknown (comments) Recent Documentation Height Weight BMI Smoking Status 1.791 m 101.3 kg 31.59 kg/m2 Never Smoker Emergency Contacts Name Discharge Info Relation Home Work Mobile Machelle Palacios  Spouse [3] [phone] Melanie Rhoades  Daughter [21] Jeison Palacios  Son [22] [phone] Patient Belongings The following personal items are in your possession at time of discharge: 
  Dental Appliances: None  Visual Aid: None   Hearing Aids/Status: Bilateral, At bedside, With patient  Home Medications: Locked   Jewelry: None  Clothing: Shirt, Pants, With patient    Other Valuables: Cell Phone, With patient, Eyeglasses Please provide this summary of care documentation to your next provider. Signatures-by signing, you are acknowledging that this After Visit Summary has been reviewed with you and you have received a copy. Patient Signature:  ____________________________________________________________ Date:  ____________________________________________________________  
  
Lucía Sleight Provider Signature:  ____________________________________________________________ Date:  ____________________________________________________________

## 2017-11-07 VITALS
RESPIRATION RATE: 18 BRPM | BODY MASS INDEX: 31.26 KG/M2 | WEIGHT: 223.3 LBS | SYSTOLIC BLOOD PRESSURE: 144 MMHG | DIASTOLIC BLOOD PRESSURE: 62 MMHG | HEIGHT: 71 IN | HEART RATE: 60 BPM | TEMPERATURE: 96.4 F | OXYGEN SATURATION: 97 %

## 2017-11-07 LAB
GLUCOSE BLD STRIP.AUTO-MCNC: 138 MG/DL (ref 65–100)
INR PPP: 1.6 (ref 0.9–1.2)
PROTHROMBIN TIME: 17.3 SEC (ref 9.6–12)

## 2017-11-07 PROCEDURE — 74011250637 HC RX REV CODE- 250/637: Performed by: INTERNAL MEDICINE

## 2017-11-07 PROCEDURE — 36415 COLL VENOUS BLD VENIPUNCTURE: CPT | Performed by: INTERNAL MEDICINE

## 2017-11-07 PROCEDURE — 85610 PROTHROMBIN TIME: CPT | Performed by: INTERNAL MEDICINE

## 2017-11-07 PROCEDURE — 74011250636 HC RX REV CODE- 250/636: Performed by: INTERNAL MEDICINE

## 2017-11-07 PROCEDURE — 3331090001 HH PPS REVENUE CREDIT

## 2017-11-07 PROCEDURE — 99218 HC RM OBSERVATION: CPT

## 2017-11-07 PROCEDURE — 74011636637 HC RX REV CODE- 636/637: Performed by: INTERNAL MEDICINE

## 2017-11-07 PROCEDURE — 82962 GLUCOSE BLOOD TEST: CPT

## 2017-11-07 PROCEDURE — 3331090002 HH PPS REVENUE DEBIT

## 2017-11-07 RX ADMIN — LISINOPRIL 2.5 MG: 2.5 TABLET ORAL at 08:10

## 2017-11-07 RX ADMIN — TAMSULOSIN HYDROCHLORIDE 0.4 MG: 0.4 CAPSULE ORAL at 08:08

## 2017-11-07 RX ADMIN — CEFAZOLIN 2 G: 1 INJECTION, POWDER, FOR SOLUTION INTRAMUSCULAR; INTRAVENOUS; PARENTERAL at 06:00

## 2017-11-07 RX ADMIN — FUROSEMIDE 40 MG: 40 TABLET ORAL at 08:07

## 2017-11-07 RX ADMIN — Medication 5 ML: at 06:21

## 2017-11-07 RX ADMIN — CARVEDILOL 6.25 MG: 6.25 TABLET ORAL at 08:09

## 2017-11-07 RX ADMIN — LEVOTHYROXINE SODIUM 125 MCG: 125 TABLET ORAL at 07:32

## 2017-11-07 RX ADMIN — ASPIRIN 81 MG: 81 TABLET, COATED ORAL at 08:09

## 2017-11-07 RX ADMIN — Medication 325 MG: at 07:33

## 2017-11-07 RX ADMIN — INSULIN LISPRO 6 UNITS: 100 INJECTION, SOLUTION INTRAVENOUS; SUBCUTANEOUS at 08:03

## 2017-11-07 NOTE — DISCHARGE INSTRUCTIONS
Learning About a Pacemaker for Heart Failure  What is a pacemaker for heart failure? A pacemaker is a small device that is placed under the skin of your chest. It is powered by batteries. It has thin wires, called leads, that pass through a vein into your heart. A pacemaker for heart failure is a biventricular pacemaker (say \"liliam-nereida-TRICK-shy-princess\"). Treatment that uses this type of pacemaker is called cardiac resynchronization therapy (CRT). When you have heart failure, the lower chambers of your heart may not pump at the same time. The pacemaker sends painless electrical signals to your heart. These signals make the chambers pump at the same time. This can help your heart pump blood better and help you feel better. Your pacemaker may be combined with an ICD, or implantable cardioverter-defibrillator. It can control abnormal heart rhythms. This can prevent sudden death. You may feel worried about having a pacemaker. This is common. It can help if you learn about how the pacemaker helps your heart. Talk to your doctor about your concerns. How is a pacemaker put in place? You will get medicine before the procedure. This helps you relax and helps prevent pain. The doctor makes a cut in the skin just below your collarbone. The cut may be on either side of your chest. The doctor will put the pacemaker leads through the cut. The leads go into a large blood vessel in the upper chest. Then the doctor will guide the leads through the blood vessel into different chambers of the heart. The doctor will place the pacemaker under the skin of your chest. He or she will attach the leads to the pacemaker. Then the cut will be closed with stitches. The procedure usually takes 2 to 3 hours. You may need to spend the night in the hospital.  What can you expect when you have a pacemaker? A pacemaker can help your heart pump blood better. It may help you feel better so you can be more active.  It also may help keep you out of the hospital and help you live longer. You can live a normal, active life with a pacemaker. But you need to avoid strong magnetic and electrical fields. Your doctor or the maker of your pacemaker can give you a full list of things to avoid. But most everyday appliances are safe. Your doctor will check your pacemaker regularly to make sure it's working right. Pacemaker batteries usually last 5 to 15 years before they need to be replaced. Follow-up care is a key part of your treatment and safety. Be sure to make and go to all appointments, and call your doctor if you are having problems. It's also a good idea to know your test results and keep a list of the medicines you take. Where can you learn more? Go to http://ashtyn-ramon.info/. Enter Y169 in the search box to learn more about \"Learning About a Pacemaker for Heart Failure. \"  Current as of: September 21, 2016  Content Version: 11.4  © 5791-1371 [employer]. Care instructions adapted under license by [employer] (which disclaims liability or warranty for this information). If you have questions about a medical condition or this instruction, always ask your healthcare professional. [place] [address] any warranty or liability for your use of this information. Implantable Cardioverter-Defibrillator Placement: What to Expect at [address] cardioverter-defibrillator (ICD) placement is surgery to put an ICD in your chest. An ICD is a small, battery-powered device that fixes life-threatening changes in your heartbeat. If the ICD detects a life-threatening rapid heart rhythm, it tries to slow the rhythm to get it back to normal. If the dangerous rhythm does not stop, the ICD sends an electric shock to the heart to restore a normal rhythm. The device then goes back to its watchful mode. Your chest may be sore where the doctor made the cut (incision) and put in the ICD. You also may have a bruise and mild swelling. These symptoms usually get better in 1 to 2 weeks. You may feel a hard ridge along the incision. This usually gets softer in the months after surgery. You probably will be able to see and feel the outline of the ICD under your skin. You will probably be able to go back to work or your usual routine 1 to 2 weeks after surgery. Your doctor will talk to you about how often you will need to have the ICD checked. When you have an ICD, it is important to avoid electrical devices that can stop your ICD from working right. Check with your doctor about what you need to stay away from, what you need to use with care, and what is okay to use. You will need to stay away from things with strong magnetic and electrical fields such as MRI machines (unless your ICD is safe for an MRI), welding equipment, and power generators. You can use a cell phone, but keep it at least 6 inches away from your ICD. You can safely use most household and office electronics. These include kitchen appliances, electric power tools, and computers. This care sheet gives you a general idea about how long it will take for you to recover. But each person recovers at a different pace. Follow the steps below to get better as quickly as possible. How can you care for yourself at home? Activity  ? · Rest when you feel tired. Getting enough sleep will help you recover. ? · Try to walk each day. Start by walking a little more than you did the day before. Bit by bit, increase the amount you walk. Walking boosts blood flow and helps prevent pneumonia and constipation. ? · For 4 to 6 weeks:  ¨ Avoid activities that strain your chest or upper arm muscles. This includes pushing a  or vacuum, mopping floors, swimming, or swinging a golf club or tennis racquet. ¨ Do not raise your arm, on the side of your body where the ICD is located, above shoulder level.   ¨ Avoid strenuous activities, such as bicycle riding, jogging, weight lifting, or heavy aerobic exercise. ¨ Avoid lifting anything that would make you strain. This may include heavy grocery bags and milk containers, a heavy briefcase or backpack, cat litter or dog food bags, or a child. ? · Ask your doctor when you can drive again. ? · You will probably need to take about 1 to 2 weeks off from work. It depends on the type of work you do and how you feel. ? · Ask your doctor when it is okay for you to have sex. Diet  ? · You can eat your normal diet. If your stomach is upset, try bland, low-fat foods like plain rice, broiled chicken, toast, and yogurt. ? · Drink plenty of fluids (unless your doctor tells you not to). Medicines  ? · Your doctor will tell you if and when you can restart your medicines. He or she will also give you instructions about taking any new medicines. ? · If you take blood thinners, such as warfarin (Coumadin), clopidogrel (Plavix), or aspirin, be sure to talk to your doctor. He or she will tell you if and when to start taking those medicines again. Make sure that you understand exactly what your doctor wants you to do. ? · Take pain medicines exactly as directed. ¨ If the doctor gave you a prescription medicine for pain, take it as prescribed. ¨ If you are not taking a prescription pain medicine, ask your doctor if you can take an over-the-counter medicine. ¨ Do not take aspirin, ibuprofen (Advil, Motrin), naproxen (Aleve), or other nonsteroidal anti-inflammatory drugs (NSAIDs) unless your doctor says it is okay. ? · If you think your pain medicine is making you sick to your stomach:  ¨ Take your medicine after meals (unless your doctor has told you not to). ¨ Ask your doctor for a different pain medicine. ? · If your doctor prescribed antibiotics, take them as directed. Do not stop taking them just because you feel better. You need to take the full course of antibiotics. Incision care  ?  · Keep the area clean and dry. ? · Ask your doctor when you can shower or take a bath. ? · If you have strips of tape on the incision, leave the tape on for a week or until it falls off.   ? · Wash the area daily with warm, soapy water, and pat it dry. Don't use hydrogen peroxide or alcohol, which can slow healing. You may cover the area with a gauze bandage if it weeps or rubs against clothing. Exercise  ? · Check with your doctor before you start an exercise program. Your doctor may recommend that you work with a physical therapist to learn how to exercise safely with an ICD. Other instructions  ? · Carry your ICD identification card with you at all times. The card should include the ICD  and model information. ? · Wear medical alert jewelry that states you have an ICD. You can buy this at most [employer]. ? · Have your ICD checked as often as your doctor recommends. In some cases, this may be done over the phone or the Internet. Kiara Woody will give you instructions about how to do this. ? · Talk with your doctor about the possibility of turning off the ICD at the end of life. You can put your wishes about the ICD in an advance directive. Follow-up care is a key part of your treatment and safety. Be sure to make and go to all appointments, and call your doctor if you are having problems. It's also a good idea to know your test results and keep a list of the medicines you take. When should you call for help? Call 911 anytime you think you may need emergency care. For example, call if:  ? · You passed out (lost consciousness). ? · You have trouble breathing. ?Call your doctor now or seek immediate medical care if:  ? · You receive a shock from your ICD. ? · You are dizzy or lightheaded, or you feel like you may faint. ? · You have pain that does not get better after you take pain medicine. ? · You hear an alarm or feel a vibration from your ICD. ?  · You have loose stitches, or your incision comes open. ? · Bright red blood has soaked through the bandage over your incision. ? · You have signs of infection, such as:  ¨ Increased pain, swelling, warmth, or redness. ¨ Red streaks leading from the incision. ¨ Pus draining from the incision. ¨ A fever. ? Watch closely for changes in your health, and be sure to contact your doctor if you have any problems. Where can you learn more? Go to http://ashtyn-ramon.info/. Enter K184 in the search box to learn more about \"Implantable Cardioverter-Defibrillator Placement: What to Expect at Home. \"  Current as of: September 21, 2016  Content Version: 11.4  © 8988-8612 [employer]. Care instructions adapted under license by [employer] (which disclaims liability or warranty for this information). If you have questions about a medical condition or this instruction, always ask your healthcare professional. [place] [address] any warranty or liability for your use of this information. DISCHARGE SUMMARY from Nurse    PATIENT INSTRUCTIONS:    After general anesthesia or intravenous sedation, for 24 hours or while taking prescription Narcotics:  · Limit your activities  · Do not drive and operate hazardous machinery  · Do not make important personal or business decisions  · Do  not drink alcoholic beverages  · If you have not urinated within 8 hours after discharge, please contact your surgeon on call.     Report the following to your surgeon:  · Excessive pain, swelling, redness or odor of or around the surgical area  · Temperature over 100.5  · Nausea and vomiting lasting longer than 4 hours or if unable to take medications  · Any signs of decreased circulation or nerve impairment to extremity: change in color, persistent  numbness, tingling, coldness or increase pain  · Any questions    What to do at Home:  Recommended activity: Activity as tolerated and No driving for 2 weeks  Patient has been instructed to keep affected arm below shoulder level for the next 4 weeks or until cleared by doctor. The arm sling should be worn while sleeping. The dressing will be removed at folllow-up appointment. The incision site must be kept clean and dry. The patient may shower in a few days. Lotions, powders, or creams should be avoided as these can increase the risk of infection. The affected arm should not be used for any pushing, pulling, or lifting until cleared by doctor. Driving is prohibited until cleared by doctor in a follow up appointment. Any signs of infection including increased redness, suspicious drainage, or unexplained fever should be reported to the doctor immediately by calling [phone]. *  Please give a list of your current medications to your Primary Care Provider. *  Please update this list whenever your medications are discontinued, doses are      changed, or new medications (including over-the-counter products) are added. *  Please carry medication information at all times in case of emergency situations. These are general instructions for a healthy lifestyle:    No smoking/ No tobacco products/ Avoid exposure to second hand smoke  Surgeon General's Warning:  Quitting smoking now greatly reduces serious risk to your health. Obesity, smoking, and sedentary lifestyle greatly increases your risk for illness    A healthy diet, regular physical exercise & weight monitoring are important for maintaining a healthy lifestyle    You may be retaining fluid if you have a history of heart failure or if you experience any of the following symptoms:  Weight gain of 3 pounds or more overnight or 5 pounds in a week, increased swelling in our hands or feet or shortness of breath while lying flat in bed. Please call your doctor as soon as you notice any of these symptoms; do not wait until your next office visit.     Recognize signs and symptoms of STROKE:    F-face looks uneven    A-arms unable to move or move unevenly    S-speech slurred or non-existent    T-time-call 911 as soon as signs and symptoms begin-DO NOT go       Back to bed or wait to see if you get better-TIME IS BRAIN. Warning Signs of HEART ATTACK     Call 911 if you have these symptoms:   Chest discomfort. Most heart attacks involve discomfort in the center of the chest that lasts more than a few minutes, or that goes away and comes back. It can feel like uncomfortable pressure, squeezing, fullness, or pain.  Discomfort in other areas of the upper body. Symptoms can include pain or discomfort in one or both arms, the back, neck, jaw, or stomach.  Shortness of breath with or without chest discomfort.  Other signs may include breaking out in a cold sweat, nausea, or lightheadedness. Don't wait more than five minutes to call 911 - MINUTES MATTER! Fast action can save your life. Calling 911 is almost always the fastest way to get lifesaving treatment. Emergency Medical Services staff can begin treatment when they arrive -- up to an hour sooner than if someone gets to the hospital by car. The discharge information has been reviewed with the patient and spouse. The patient and spouse verbalized understanding. Discharge medications reviewed with the patient and spouse and appropriate educational materials and side effects teaching were provided.   ___________________________________________________________________________________________________________________________________

## 2017-11-07 NOTE — PROGRESS NOTES
Bedside and Verbal shift change report given to Bryn Friend RN (oncoming nurse) by self (offgoing nurse). Report included the following information SBAR, Kardex, MAR and Recent Results.
Bedside and Verbal shift change report given to self (oncoming nurse) by Almita Sutton RN (offgoing nurse). Report included the following information SBAR, Kardex, MAR and Recent Results.
Care Management Interventions  PCP Verified by CM: Yes (has appointment in 3 weeks )  Mode of Transport at Discharge: Other (see comment) (wife)  Transition of Care Consult (CM Consult): [address]: Yes  Physical Therapy Consult: Yes  Current Support Network: Lives with Spouse  Confirm Follow Up Transport: Family  Plan discussed with Pt/Family/Caregiver: Yes  Freedom of Choice Offered: Yes  Discharge Location  Discharge Placement: Home with home health  Met with patient for d/c planning. Patient alert and oriented x 3, independent of ADL's, lives with his wife. Patient states has a walker he uses at home. Patient was current with [address] [place] [address] for PT. I called [address] and informed him of patients discharge and request to resume home health. Per Chip since patient was observation would be able to resume care and no new orders were needed. Patient to be d/c home with resumption of home health.
Discharge instructions reviewed with patient and wife. No new prescriptions provided. Opportunity for questions provided. Patient and wife voiced understanding of all discharge instructions.
IV's and heart monitor removed at discharge.
Patient given meal tray and beverage.
Patient received to [address] [place] [address] room # 4  Ambulatory from [facility]. Patient scheduled for BiV ICD today with Dr Sadie Christianson. Procedure reviewed & questions answered, voiced good understanding consent obtained & placed on chart. All medications and medical history reviewed. Will prep patient per orders. Patient & family updated on plan of care.
Patient transported to room 317 via transport.
Patient's family at bedside, patient and family updated on plan of care.
Pt admitted at Observation status. Pt instructed on home medication policy; pt voices understanding. Pt provided copies of the following: Admission pamphlet with Observation insert and Medicare FAQ's. Home meds ordered per MD, verified with [facility] and supplied by patient. No narcotic meds. Medications verified and labeled per pharmacy and placed in locked box in patient's room. The medications received from the patient include Asa, Coreg, Iron, Lasix, Synthroid, Lisinopril, Simvastatin, Flomax.
Report received from [address] [place] [address]. Procedural findings communicated. Intra procedural  medication administration reviewed. Progression of care discussed.      Patient received into [address] [facility] 6 post sheath removal.     Access site without bleeding or swelling yes    Dressing dry and intact yes    Patient instructed to limit movement to left upper extremity    Routine post procedural vital signs and site assessment initiated yes
Skin assessed upon arrival. Sacrum pink but blanchable. Left subclavian incision with dressing intact. No other abnormalities noted.
TRANSFER - IN REPORT:    Verbal report received from [address] [place] [address] RN(name) on Stephanie White  being received from cath lab  (unit) for routine progression of care      Report consisted of patients Situation, Background, Assessment and   Recommendations(SBAR). Information from the following report(s) Procedure Summary, MAR and Cardiac Rhythm Paced was reviewed with the receiving nurse. Opportunity for questions and clarification was provided. Assessment completed upon patients arrival to unit and care assumed.
TRANSFER - OUT REPORT:    Verbal report given to INGA Portillo(name) on Douglas Avila  being transferred to telemetry room 317(unit) for routine progression of care       Report consisted of patients Situation, Background, Assessment and   Recommendations(SBAR). Information from the following report(s) Procedure Summary was reviewed with the receiving nurse. Lines:   Peripheral IV 11/06/17 Left Antecubital (Active)        Opportunity for questions and clarification was provided.
Verbal and bedside report given to oncoming RN, Eliceo Calloway.
n/a

## 2017-11-07 NOTE — DISCHARGE SUMMARY
[address] [place] [address] Cardiology Discharge Summary     Patient ID:  Sis Jackson  [id number]  79 y.o.  1935    Admit date: 11/6/2017    Discharge date: 11/7/2017    Admitting Physician: Radha Dennis MD     Discharge Physician: Joe Ryan NP/Dr. Chavez    Admission Diagnoses: Pacemaker at end of battery life [Z45.010]    Discharge Diagnoses:    Diagnosis    Ischemic cardiomyopathy    Essential hypertension    Chronic kidney disease (CKD), stage II (mild)    Controlled type 2 diabetes mellitus with stage 2 chronic kidney disease, with long-term current use of insulin (HCC)    S/P CABG (coronary artery bypass graft)    Chronic systolic heart failure (HCC)    Presence of cardiac defibrillator, automatic     Atrial fibrillation, chronic, permanent [facility])       Cardiology Procedures this admission:  Biventricular ICD gen change  Consults: None    Hospital Course: Patient was seen at the office of [address] [place] [address] Cardiology by Dr. Astrid Gallego for management of atrial fibrillation with device JAVID and was subsequently scheduled for an AM Admission generator change at [facility] on 11/6/17. Patient was taken to the EP lab and replacement of [employer] biventricular ICD by Dr. Astrid Gallego. Patient tolerated the procedure well and was taken to the telemetry floor for recovery. The following morning patient was up feeling well without any complaints of chest pain, shortness of breath, or palpitations. Patient's left subclavian cath site was clean, dry and intact without hematoma. Patient's labs were WNL. Patient was seen and examined by Dr. Joana Steiner and determined stable and ready for discharge. Patient was instructed on the importance of medication compliance and outpatient follow up. Patient has been scheduled for follow-up with [address] [place] [address] Cardiology -- device clinic on 11/22/17 at 9:00am in the [place] office.      DISPOSITION: Patient has been instructed to keep affected arm below shoulder level for the next 4 weeks or until cleared by doctor. The arm sling should be worn while sleeping. The dressing will be removed at folllow-up appointment. The incision site must be kept clean and dry. The patient may shower in a few days. Lotions, powders, or creams should be avoided as these can increase the risk of infection. The affected arm should not be used for any pushing, pulling, or lifting until cleared by doctor. Driving is prohibited until cleared by doctor in a follow up appointment. Any signs of infection including increased redness, suspicious drainage, or unexplained fever should be reported to the doctor immediately by calling [phone]. Discharge Exam:  Patient has been seen by Dr. Alexys Dimas: see his progress note for exam details.     Visit Vitals    /68 (BP 1 Location: Left arm, BP Patient Position: At rest)    Pulse (!) 59    Temp 97.4 °F (36.3 °C)    Resp 17    Ht 5' 10.5\" (1.791 m)    Wt 101.3 kg (223 lb 4.8 oz)    SpO2 98%    BMI 31.59 kg/m2         Recent Results (from the past 24 hour(s))   GLUCOSE, POC    Collection Time: 11/06/17 11:25 AM   Result Value Ref Range    Glucose (POC) 84 65 - 100 mg/dL   EKG, 12 LEAD, INITIAL    Collection Time: 11/06/17 11:55 AM   Result Value Ref Range    Ventricular Rate 60 BPM    Atrial Rate 44 BPM    QRS Duration 142 ms    Q-T Interval 500 ms    QTC Calculation (Bezet) 500 ms    Calculated R Axis 120 degrees    Calculated T Axis -30 degrees    Diagnosis       Electronic ventricular pacemaker  No previous ECGs available  Confirmed by ISH KAUR (), Davian Delgado ([id number]) on 11/6/2017 12:39:35 PM     GLUCOSE, POC    Collection Time: 11/06/17  2:43 PM   Result Value Ref Range    Glucose (POC) 69 65 - 100 mg/dL   GLUCOSE, POC    Collection Time: 11/06/17  4:51 PM   Result Value Ref Range    Glucose (POC) 174 (H) 65 - 100 mg/dL   EKG, 12 LEAD, INITIAL    Collection Time: 11/06/17  7:51 PM   Result Value Ref Range    Ventricular Rate 63 BPM    Atrial Rate 83 BPM QRS Duration 142 ms    Q-T Interval 484 ms    QTC Calculation (Bezet) 495 ms    Calculated R Axis 155 degrees    Calculated T Axis 85 degrees    Diagnosis       Electronic ventricular pacemaker  When compared with ECG of 06-NOV-2017 11:55,  Vent. rate has increased BY   3 BPM  Confirmed by ST KERRI CHASE MD (), JANNA OCHOA ([id number]) on 11/6/2017 8:34:52 PM     GLUCOSE, POC    Collection Time: 11/06/17  9:40 PM   Result Value Ref Range    Glucose (POC) 251 (H) 65 - 100 mg/dL         Patient Instructions:   Current Discharge Medication List      CONTINUE these medications which have NOT CHANGED    Details   GLUC/CHND/OM3/DHA/EPA/FISH/STR (GLUCOSAMINE CHONDROITIN PLUS PO) Take 2 Caplet by mouth daily. omeprazole (PRILOSEC) 20 mg capsule Take 1 capsule by mouth  daily  Qty: 90 Cap, Refills: 3      potassium chloride (K-DUR, KLOR-CON) 20 mEq tablet Take 1 tablet by mouth  daily  Qty: 90 Tab, Refills: 3      simvastatin (ZOCOR) 20 mg tablet Take 1 tablet by mouth  nightly  Qty: 90 Tab, Refills: 3      warfarin (COUMADIN) 5 mg tablet 1 to 1&1/2 tabs every day or as directed  Qty: 120 Tab, Refills: 3      insulin aspart (NOVOLOG) 100 unit/mL injection by SubCUTAneous route as needed. tamsulosin (FLOMAX) 0.4 mg capsule TAKE 1 CAPSULE BY MOUTH EVERY DAY  Qty: 90 Cap, Refills: 3    Comments: **Patient requests 90 days supply**      carvedilol (COREG) 6.25 mg tablet Take 1 Tab by mouth two (2) times daily (with meals). Qty: 180 Tab, Refills: 2      furosemide (LASIX) 40 mg tablet Take 1 Tab by mouth two (2) times a day. Qty: 180 Tab, Refills: 2      levothyroxine (SYNTHROID) 125 mcg tablet Take 1 Tab by mouth Daily (before breakfast). Qty: 90 Tab, Refills: 3      lisinopril (ZESTRIL) 2.5 mg tablet Take 1 Tab by mouth daily. Qty: 90 Tab, Refills: 3      metOLazone (ZAROXOLYN) 2.5 mg tablet Take 1 Tab by mouth as needed. Qty: 90 Tab, Refills: 1      aspirin delayed-release 81 mg tablet Take 81 mg by mouth daily. multivitamin (ONE A DAY) tablet Take 1 Tab by mouth daily. insulin glargine (LANTUS) 100 unit/mL injection 16 Units by SubCUTAneous route. As directed       ferrous sulfate (IRON) 325 mg (65 mg iron) tablet Take 65 mg by mouth Daily (before breakfast). traMADol-acetaminophen (ULTRACET) 37.5-325 mg per tablet Take 1 Tab by mouth every six (6) hours as needed for Pain. Max Daily Amount: 4 Tabs. Qty: 30 Tab, Refills: 2      clobetasol (TEMOVATE) 0.05 % ointment Apply  to affected area two (2) times a day. 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 ! lancets 33 gauge misc Test BS TID/PRN Dx E11.9. Pt uses one touch delica 33 gauge lancets  Qty: 1 Package, Refills: 12      Insulin Needles, Disposable, (BD INSULIN PEN NEEDLE UF MINI) 31 gauge x 3/16\" ndle Use as directed, E11.9  Qty: 100 Pen Needle, Refills: 12    Associated Diagnoses: Type 2 diabetes mellitus without complication, with long-term current use of insulin ([address]      ! ! lancets (Pennye Keens LANCETS) 30 gauge misc Testing TID/PRN, E11.9  Qty: 1 Package, Refills: 12    Associated Diagnoses: Type 2 diabetes mellitus without complication, with long-term current use of insulin ([facility])      glucose blood VI test strips (ONETOUCH ULTRA TEST) strip TEST BS TID AM DX E11.22  Qty: 100 Strip, Refills: 12       !! - Potential duplicate medications found. Please discuss with provider.             Signed:  Nolan Jones NP  11/7/2017 6:23 AM

## 2017-11-08 ENCOUNTER — HOME CARE VISIT (OUTPATIENT)
Dept: HOME HEALTH SERVICES | Facility: HOME HEALTH | Age: 82
End: 2017-11-08
Payer: MEDICARE

## 2017-11-08 PROCEDURE — 3331090002 HH PPS REVENUE DEBIT

## 2017-11-08 PROCEDURE — 3331090001 HH PPS REVENUE CREDIT

## 2017-11-09 ENCOUNTER — HOME CARE VISIT (OUTPATIENT)
Dept: SCHEDULING | Facility: HOME HEALTH | Age: 82
End: 2017-11-09
Payer: MEDICARE

## 2017-11-09 VITALS
OXYGEN SATURATION: 98 % | SYSTOLIC BLOOD PRESSURE: 124 MMHG | HEART RATE: 61 BPM | TEMPERATURE: 97.4 F | DIASTOLIC BLOOD PRESSURE: 71 MMHG

## 2017-11-09 PROCEDURE — 3331090002 HH PPS REVENUE DEBIT

## 2017-11-09 PROCEDURE — 3331090001 HH PPS REVENUE CREDIT

## 2017-11-09 PROCEDURE — G0151 HHCP-SERV OF PT,EA 15 MIN: HCPCS

## 2017-11-10 PROCEDURE — 3331090002 HH PPS REVENUE DEBIT

## 2017-11-10 PROCEDURE — 3331090001 HH PPS REVENUE CREDIT

## 2017-11-11 PROCEDURE — 3331090002 HH PPS REVENUE DEBIT

## 2017-11-11 PROCEDURE — 3331090001 HH PPS REVENUE CREDIT

## 2017-11-12 ENCOUNTER — HOME CARE VISIT (OUTPATIENT)
Dept: HOME HEALTH SERVICES | Facility: HOME HEALTH | Age: 82
End: 2017-11-12
Payer: MEDICARE

## 2017-11-12 PROCEDURE — 3331090001 HH PPS REVENUE CREDIT

## 2017-11-12 PROCEDURE — 3331090002 HH PPS REVENUE DEBIT

## 2017-11-13 PROCEDURE — 3331090002 HH PPS REVENUE DEBIT

## 2017-11-13 PROCEDURE — 3331090001 HH PPS REVENUE CREDIT

## 2017-11-14 PROCEDURE — 3331090001 HH PPS REVENUE CREDIT

## 2017-11-14 PROCEDURE — 3331090002 HH PPS REVENUE DEBIT

## 2017-11-15 ENCOUNTER — HOME CARE VISIT (OUTPATIENT)
Dept: SCHEDULING | Facility: HOME HEALTH | Age: 82
End: 2017-11-15
Payer: MEDICARE

## 2017-11-15 VITALS
RESPIRATION RATE: 19 BRPM | TEMPERATURE: 98 F | HEART RATE: 84 BPM | SYSTOLIC BLOOD PRESSURE: 120 MMHG | DIASTOLIC BLOOD PRESSURE: 56 MMHG

## 2017-11-15 PROCEDURE — G0157 HHC PT ASSISTANT EA 15: HCPCS

## 2017-11-15 PROCEDURE — G0155 HHCP-SVS OF CSW,EA 15 MIN: HCPCS

## 2017-11-15 PROCEDURE — 3331090001 HH PPS REVENUE CREDIT

## 2017-11-15 PROCEDURE — 3331090002 HH PPS REVENUE DEBIT

## 2017-11-16 PROCEDURE — 3331090002 HH PPS REVENUE DEBIT

## 2017-11-16 PROCEDURE — 3331090001 HH PPS REVENUE CREDIT

## 2017-11-17 ENCOUNTER — HOME CARE VISIT (OUTPATIENT)
Dept: SCHEDULING | Facility: HOME HEALTH | Age: 82
End: 2017-11-17
Payer: MEDICARE

## 2017-11-17 VITALS
HEART RATE: 78 BPM | TEMPERATURE: 97.8 F | SYSTOLIC BLOOD PRESSURE: 122 MMHG | DIASTOLIC BLOOD PRESSURE: 60 MMHG | RESPIRATION RATE: 18 BRPM

## 2017-11-17 PROCEDURE — 3331090001 HH PPS REVENUE CREDIT

## 2017-11-17 PROCEDURE — G0157 HHC PT ASSISTANT EA 15: HCPCS

## 2017-11-17 PROCEDURE — 3331090002 HH PPS REVENUE DEBIT

## 2017-11-18 PROCEDURE — 3331090001 HH PPS REVENUE CREDIT

## 2017-11-18 PROCEDURE — 3331090002 HH PPS REVENUE DEBIT

## 2017-11-19 PROCEDURE — 3331090002 HH PPS REVENUE DEBIT

## 2017-11-19 PROCEDURE — 3331090001 HH PPS REVENUE CREDIT

## 2017-11-20 ENCOUNTER — HOME CARE VISIT (OUTPATIENT)
Dept: SCHEDULING | Facility: HOME HEALTH | Age: 82
End: 2017-11-20
Payer: MEDICARE

## 2017-11-20 VITALS
SYSTOLIC BLOOD PRESSURE: 124 MMHG | DIASTOLIC BLOOD PRESSURE: 56 MMHG | RESPIRATION RATE: 18 BRPM | TEMPERATURE: 97.7 F | HEART RATE: 84 BPM

## 2017-11-20 PROCEDURE — G0157 HHC PT ASSISTANT EA 15: HCPCS

## 2017-11-20 PROCEDURE — 3331090002 HH PPS REVENUE DEBIT

## 2017-11-20 PROCEDURE — 3331090001 HH PPS REVENUE CREDIT

## 2017-11-21 PROCEDURE — 3331090001 HH PPS REVENUE CREDIT

## 2017-11-21 PROCEDURE — 3331090002 HH PPS REVENUE DEBIT

## 2017-11-22 PROCEDURE — 3331090002 HH PPS REVENUE DEBIT

## 2017-11-22 PROCEDURE — 3331090001 HH PPS REVENUE CREDIT

## 2017-11-23 PROCEDURE — 3331090002 HH PPS REVENUE DEBIT

## 2017-11-23 PROCEDURE — 3331090001 HH PPS REVENUE CREDIT

## 2017-11-24 ENCOUNTER — HOME CARE VISIT (OUTPATIENT)
Dept: SCHEDULING | Facility: HOME HEALTH | Age: 82
End: 2017-11-24
Payer: MEDICARE

## 2017-11-24 VITALS
DIASTOLIC BLOOD PRESSURE: 72 MMHG | SYSTOLIC BLOOD PRESSURE: 140 MMHG | HEART RATE: 78 BPM | TEMPERATURE: 97.2 F | RESPIRATION RATE: 18 BRPM

## 2017-11-24 PROCEDURE — 3331090002 HH PPS REVENUE DEBIT

## 2017-11-24 PROCEDURE — 3331090001 HH PPS REVENUE CREDIT

## 2017-11-24 PROCEDURE — G0157 HHC PT ASSISTANT EA 15: HCPCS

## 2017-11-25 PROCEDURE — 3331090001 HH PPS REVENUE CREDIT

## 2017-11-25 PROCEDURE — 3331090002 HH PPS REVENUE DEBIT

## 2017-11-26 PROCEDURE — 3331090002 HH PPS REVENUE DEBIT

## 2017-11-26 PROCEDURE — 3331090001 HH PPS REVENUE CREDIT

## 2017-11-27 ENCOUNTER — HOME CARE VISIT (OUTPATIENT)
Dept: SCHEDULING | Facility: HOME HEALTH | Age: 82
End: 2017-11-27
Payer: MEDICARE

## 2017-11-27 VITALS
TEMPERATURE: 97.4 F | RESPIRATION RATE: 18 BRPM | HEART RATE: 72 BPM | DIASTOLIC BLOOD PRESSURE: 56 MMHG | SYSTOLIC BLOOD PRESSURE: 132 MMHG

## 2017-11-27 PROCEDURE — 3331090002 HH PPS REVENUE DEBIT

## 2017-11-27 PROCEDURE — 3331090001 HH PPS REVENUE CREDIT

## 2017-11-27 PROCEDURE — G0157 HHC PT ASSISTANT EA 15: HCPCS

## 2017-11-28 PROCEDURE — 3331090002 HH PPS REVENUE DEBIT

## 2017-11-28 PROCEDURE — 3331090001 HH PPS REVENUE CREDIT

## 2017-11-29 ENCOUNTER — HOME CARE VISIT (OUTPATIENT)
Dept: SCHEDULING | Facility: HOME HEALTH | Age: 82
End: 2017-11-29
Payer: MEDICARE

## 2017-11-29 VITALS
SYSTOLIC BLOOD PRESSURE: 121 MMHG | HEART RATE: 60 BPM | DIASTOLIC BLOOD PRESSURE: 65 MMHG | RESPIRATION RATE: 16 BRPM | TEMPERATURE: 97.2 F | OXYGEN SATURATION: 98 %

## 2017-11-29 PROCEDURE — 3331090002 HH PPS REVENUE DEBIT

## 2017-11-29 PROCEDURE — 3331090001 HH PPS REVENUE CREDIT

## 2017-11-29 PROCEDURE — G0151 HHCP-SERV OF PT,EA 15 MIN: HCPCS

## 2017-11-29 PROCEDURE — 3331090003 HH PPS REVENUE ADJ

## 2017-11-30 PROCEDURE — 3331090001 HH PPS REVENUE CREDIT

## 2017-11-30 PROCEDURE — 3331090002 HH PPS REVENUE DEBIT

## 2017-12-01 PROCEDURE — 3331090002 HH PPS REVENUE DEBIT

## 2017-12-01 PROCEDURE — 3331090001 HH PPS REVENUE CREDIT

## 2017-12-02 PROCEDURE — 3331090002 HH PPS REVENUE DEBIT

## 2017-12-02 PROCEDURE — 3331090001 HH PPS REVENUE CREDIT

## 2017-12-03 PROCEDURE — 3331090001 HH PPS REVENUE CREDIT

## 2017-12-03 PROCEDURE — 3331090002 HH PPS REVENUE DEBIT

## 2017-12-05 ENCOUNTER — HOSPITAL ENCOUNTER (OUTPATIENT)
Dept: PHYSICAL THERAPY | Age: 82
Discharge: HOME | End: 2017-12-05
Payer: MEDICARE

## 2017-12-05 PROCEDURE — 97162 PT EVAL MOD COMPLEX 30 MIN: CPT

## 2017-12-05 PROCEDURE — G8978 MOBILITY CURRENT STATUS: HCPCS

## 2017-12-05 PROCEDURE — G8979 MOBILITY GOAL STATUS: HCPCS

## 2017-12-05 PROCEDURE — 97110 THERAPEUTIC EXERCISES: CPT

## 2017-12-05 NOTE — PROGRESS NOTES
Brandi Rodgers  : 1935  [place] / [place] / Product Type: Medicare /  [address]  at [facility]  [address], [address]  [phone]   [phone]        OUTPATIENT PHYSICAL THERAPY:Initial Assessment 2017    ICD-10: Treatment Diagnosis: M25.551  Pain in R hip  M25.561  Stiffness in R hip  R26.2  Difficulty walking  Precautions/Allergies:   Latex   Fall Risk Score: 2 (? 5 = High Risk)  MD Orders: eval and treat MEDICAL/REFERRING DIAGNOSIS:  Unsteadiness on feet [R26.81]   DATE OF ONSET: about 3 months ago  REFERRING PHYSICIAN: Debbie Mcclure MD  RETURN PHYSICIAN APPOINTMENT: TBD     INITIAL ASSESSMENT:  Mr. Tremayne Hernandez presents to PT with referral for treatment in the pool due to severe arthritis of the R hip, as well as RLE weakness and difficulty walking. He has recently had home health PT to help with mobility. He has multiple medical co-morbidities, including difficulty with stairs that may make getting into and out of our pool very difficult. We discussed an initial plan of PT in the gym, to help improve flexibility and general strength, then probable transition to the pool. He seems well motivated; wife is supportive. I expect progress to be slow due to multiple medical problems and decreased tolerance for exercise. PROBLEM LIST (Impacting functional limitations):  1. Decreased Strength  2. Decreased ADL/Functional Activities  3. Decreased Ambulation Ability/Technique  4. Decreased Balance  5. Increased Pain  6. Decreased Activity Tolerance  7. Decreased Flexibility/Joint Mobility  8. Decreased [place] with Home Exercise Program INTERVENTIONS PLANNED:  1. Balance Exercise  2. Bed Mobility  3. Gait Training  4. Home Exercise Program (HEP)  5. Range of Motion (ROM)  6. Therapeutic Exercise/Strengthening   7. Aquatics   TREATMENT PLAN:  Effective Dates: 2017 TO 3-1-2018.   Frequency/Duration: 2 times a week for 8 weeks, and upon reassessment will adjust frequency and duration as progress indicates. GOALS: (Goals have been discussed and agreed upon with patient.)  Short-Term Functional Goals: Time Frame: 4 weeks  1. Improve R hip PROM to 90 + ° for ease in sitting and rising  2. Improve R hip strength to allow 1 x 10 SLR  3. Improve ease of moving to allow pt to get in and out of bed without asst  Discharge Goals: Time Frame: 12 weeks  1. Pt able to don socks and shoes with no more than set up asst  2. Improve strength and stability BLEs to allow reciprocal stair ascending  3. Pt to tolerate 30+ min exercise in the pool without increased sx  4. Pt to be independent in basic HEP for ROM and strengthening ex  5. Rehabilitation Potential For Stated Goals: [id number] [facility], I certify that the treatment plan above will be carried out by a therapist or under their direction. Thank you for this referral,  Kyung Burgess, PT     Referring Physician Signature: Ann-Marie Cobian MD              Date                    The information in this section was collected on 12-5 (except where otherwise noted). 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 His pain and weakness got much worse suddenly after MRI--he blames it on having to lie on the hard table for so long. Since that time about 2 months ago, his pain is worse and weakness is worse as well. Past Medical History/Comorbidities:   Mr. Gerhard Pacheco  has a past medical history of Arthritis; Atrial fibrillation ([address] CAD (coronary artery disease); Cancer ([address] DM (diabetes mellitus), type 2 ([address] Family history of prostate problems; GERD (gastroesophageal reflux disease); Hearing difficulty; HLD (hyperlipidemia); Hypertension; Hypothyroidism; Insulin dependent diabetes mellitus ([address]  Kidney problem; and Systolic heart failure [facility]).  Mr. Miriam Conte  has a past surgical history that includes coronary artery bypass graft; other surgical; other surgical; cataract removal (04/2011); tonsillectomy; hernia repair (08/2004); appendectomy (08/2004); prostatectomy (03/01/2006); cardiac surg procedure unlist (06/2007); aortic valve replacement (11/10/2011); cardiac surg procedure unlist (11/14/2011); cardiac surg procedure unlist (05/10/2013); orthopaedic (1973); and knee replacement (04/25/2016). R TKA 2015. CABG in 2007. Pacemaker and defibrillator in 2015, due to silent MI. Pt reports that he is in Afib all the time. Has IDDM, aortic calcification, HTN. Social History/Living Environment:     . Lives with wife in 1 level house. Has only  Lived in [place] 1 yr, moved from [place]  Prior Level of Function/Work/Activity:  Had been more mobile, with less pain prior to this episode of hip pain. Retired. Dominant Side:         RIGHT  Personal Factors:          Age:  81 y.o. Current Medications:       Current Outpatient Prescriptions:     potassium chloride (K-DUR, KLOR-CON) 20 mEq tablet, Take 1 Tab by mouth daily. , Disp: 90 Tab, Rfl: 3    tamsulosin (FLOMAX) 0.4 mg capsule, Take 1 Cap by mouth daily. , Disp: 90 Cap, Rfl: 3    traMADol-acetaminophen (ULTRACET) 37.5-325 mg per tablet, Take 1 Tab by mouth every six (6) hours as needed for Pain. Max Daily Amount: 4 Tabs., Disp: 30 Tab, Rfl: 2    GLUC/CHND/OM3/DHA/EPA/FISH/STR (GLUCOSAMINE CHONDROITIN PLUS PO), Take 2 Caplet by mouth daily. , Disp: , Rfl:     omeprazole (PRILOSEC) 20 mg capsule, Take 1 capsule by mouth  daily, Disp: 90 Cap, Rfl: 3    simvastatin (ZOCOR) 20 mg tablet, Take 1 tablet by mouth  nightly, Disp: 90 Tab, Rfl: 3    warfarin (COUMADIN) 5 mg tablet, 1 to 1&1/2 tabs every day or as directed (Patient taking differently: Take 5 mg by mouth daily.  1 to 1&1/2 tabs every day or as directed  Pt states he take 7.5mg Monday and Friday and 5mg on all other days), Disp: 120 Tab, Rfl: 3    insulin aspart (NOVOLOG) 100 unit/mL injection, by SubCUTAneous route as needed. 6 am, lunch 4 ; 6 supper-plus few units by scale if high, Disp: , Rfl:     clobetasol (TEMOVATE) 0.05 % ointment, Apply  to affected area two (2) times a day., Disp: 15 g, Rfl: 12    carvedilol (COREG) 6.25 mg tablet, Take 1 Tab by mouth two (2) times daily (with meals). , Disp: 180 Tab, Rfl: 2    furosemide (LASIX) 40 mg tablet, Take 1 Tab by mouth two (2) times a day., Disp: 180 Tab, Rfl: 2    levothyroxine (SYNTHROID) 125 mcg tablet, Take 1 Tab by mouth Daily (before breakfast). , Disp: 90 Tab, Rfl: 3    lisinopril (ZESTRIL) 2.5 mg tablet, Take 1 Tab by mouth daily. , Disp: 90 Tab, Rfl: 3    metOLazone (ZAROXOLYN) 2.5 mg tablet, Take 1 Tab by mouth as needed. , Disp: 90 Tab, Rfl: 1    lancets 33 gauge misc, Test BS TID/PRN Dx E11.9. Pt uses one touch delica 33 gauge lancets, Disp: 1 Package, Rfl: 12    Insulin Needles, Disposable, (BD INSULIN PEN NEEDLE UF MINI) 31 gauge x 3/16\" ndle, Use as directed, E11.9, Disp: 100 Pen Needle, Rfl: 12    lancets (ONETOUCH DELICA LANCETS) 30 gauge misc, Testing TID/PRN, E11.9, Disp: 1 Package, Rfl: 12    glucose blood VI test strips (ONETOUCH ULTRA TEST) strip, TEST BS TID AM DX E11.22, Disp: 100 Strip, Rfl: 12    aspirin delayed-release 81 mg tablet, Take 81 mg by mouth daily. , Disp: , Rfl:     multivitamin (ONE A DAY) tablet, Take 1 Tab by mouth daily. , Disp: , Rfl:     insulin glargine (LANTUS) 100 unit/mL injection, 16 Units by SubCUTAneous route. As directed , Disp: , Rfl:     ferrous sulfate (IRON) 325 mg (65 mg iron) tablet, Take 65 mg by mouth Daily (before breakfast). , Disp: , Rfl:    Date Last Reviewed:  12/5/2017   Number of Personal Factors/Comorbidities that affect the Plan of Care: 3+: HIGH COMPLEXITY   EXAMINATION:   Observation/Orthostatic Postural Assessment:          Pt is a large man, walks with rollator, sits comfortably on rollator.   Has slight difficulty with sit to stand. Sits with poor posture; stands with slight trunk/ hip flexion. Slightly [place]. Palpation:          Some bilat lower leg swelling. Flat area palpable at superior aspect of flexed R knee ( distal quad atrophy?). Varicose veins prominent in R leg. ROM:            PROM Date:   12-5 Date:   Date:     R hip flex ~ 85 °      R hip abd ~ 25 °      R hip ER ~ 20 °            AROM:  R flex ~ 35 °      R abd ~ 20 °                Strength:           Date:  12-5 Date:   Date:     R hip flex 3-     R   Hip abd 2- to 2     R knee extn 4-     R knee flex 3+ to 4-     R DF 4           L hip flex 3+     L knee extn 4     L knee flex 4-     L DF 4-         Neurological Screen:        Sensation: Pt denies any sensory changes, denies peripheral neuropathy in LEs  Functional Mobility:         Gait/Ambulation:  ambs with rollator, shuffling gait, slight lean forward. Can walk short distances without walker ( eg, to the toilet). Pt reports that on good days, he can walk as far as about 4-5 blocks. Bed Mobility:  Pt reports that he needs some asst at home to get legs into bed. On the mat in PT, he needed very min asst for R leg. Stairs:  No stairs in the home. Balance:          NT on initial assessment. Body Structures Involved:  1. Nerves  2. Bones  3. Joints  4. Muscles  5. Ligaments Body Functions Affected:  1. Mental  2. Sensory/Pain  3. Neuromusculoskeletal  4. Movement Related Activities and Participation Affected:  1. General Tasks and Demands  2. Mobility  3. Self Care  4. Domestic Life  5. Interpersonal Interactions and Relationships  6. Community, Social and [facility] [address]   Number of elements (examined above) that affect the Plan of Care: 3: MODERATE COMPLEXITY   CLINICAL PRESENTATION:   Presentation: Evolving clinical presentation with changing clinical characteristics: MODERATE COMPLEXITY   CLINICAL DECISION MAKING:   Outcome Measure:    Tool Used: Lower Extremity Functional Scale (LEFS)  Score:  Initial: 25/80 Most Recent: X/80 (Date: -- )   Interpretation of Score: 20 questions each scored on a 5 point scale with 0 representing \"extreme difficulty or unable to perform\" and 4 representing \"no difficulty\". The lower the score, the greater the functional disability. 80/80 represents no disability. Minimal detectable change is 9 points. Score 80 79-63 62-48 47-32 31-16 15-1 0   Modifier CH CI CJ CK CL CM CN     ? Mobility - Walking and Moving Around:     - CURRENT STATUS: CL - 60%-79% impaired, limited or restricted    - GOAL STATUS: CK - 40%-59% impaired, limited or restricted    - D/C STATUS:  ---------------To be determined---------------      Medical Necessity:   · Skilled intervention continues to be required due to lack of hip ROM and strength, which severely impairs function. Reason for Services/Other Comments:  · Patient continues to demonstrate capacity to improve flexibility, ROM and function which will increase independence. Use of outcome tool(s) and clinical judgement create a POC that gives a: Questionable prediction of patient's progress: MODERATE COMPLEXITY            TREATMENT:   (In addition to Assessment/Re-Assessment sessions the following treatments were rendered)  Pre-treatment Symptoms/Complaints:  Pt reports moderate R hip pain and weakness, limiting some ADLs and mobility. Pain: Initial: Pain Intensity 1: 4  Post Session:  4     Evaluation (X)  Therapeutic Exercise   ( 20 minutes ):  To decrease pain, improve flexibility and motion, and increase strength. Will provide verbal and manual cues as needed to ensure proper performance of the exercises. Will increase range of motion, resistance and intensity as pt tolerates.     Pt performed LE ex on the mat:    SLR--1 x 10 each leg  SLR--1 x 10 with L, min asst for R  Hip flexion from hooklying--2 x 10  Passive stretch for abd/ER, from hooklying  Reviewed standing ex pt has been given previously  Oriented pt to the pool     Modalities  (   ):      [facility]  Treatment/Session Assessment:    · Response to Treatment:  Pt seemed to enjoy some of the stretching. .Does not tolerate passive stretching very well. .  · Compliance with Program/Exercises: Will assess as treatment progresses. · Recommendations/Intent for next treatment session:   Next visit will focus on improving passive and active R hip motion and strength, to allow better function. .  Total Treatment Duration:   60  min  Total number of treatments:    1     PT Patient Time In/Time Out  Time In: 0200  Time Out: One Rosa Betancourt PT

## 2017-12-05 NOTE — PROGRESS NOTES
Ambulatory/Rehab Services H2 Model Falls Risk Assessment    Risk Factor Pts. ·   Confusion/Disorientation/Impulsivity  []    4 ·   Symptomatic Depression  []   2 ·   Altered Elimination  []   1 ·   Dizziness/Vertigo  []   1 ·   Gender (Male)  [x]   1 ·   Any administered antiepileptics (anticonvulsants):  []   2 ·   Any administered benzodiazepines:  []   1 ·   Visual Impairment (specify):  []   1 ·   Portable Oxygen Use  []   1 ·   Orthostatic ? BP  []   1 ·   History of Recent Falls (within 3 mos.)  []   5     Ability to Rise from Chair (choose one) Pts. ·   Ability to rise in a single movement  []   0 ·   Pushes up, successful in one attempt  [x]   1 ·   Multiple attempts, but successful  []   3 ·   Unable to rise without assistance  []   4   Total: (5 or greater = High Risk) 2     Falls Prevention Plan:   []                Physical Limitations to Exercise (specify):   []                Mobility Assistance Device (type):   []                Exercise/Equipment Adaptation (specify):    ©2010 [facility] [address] Patent #[id number].  Federal Law prohibits the replication, distribution or use without written permission from [facility] [employer]

## 2017-12-06 NOTE — THERAPY EVALUATION
Brina Weiner  : 1935  [place] / [place] / Product Type: Medicare /  [address]  at [address], [address]  [phone]   [phone]        OUTPATIENT PHYSICAL THERAPY:Initial Assessment 2017    ICD-10: Treatment Diagnosis: M25.551  Pain in R hip  M25.561  Stiffness in R hip  R26.2  Difficulty walking  Precautions/Allergies:   Latex   Fall Risk Score: 2 (? 5 = High Risk)  MD Orders: eval and treat MEDICAL/REFERRING DIAGNOSIS:  Unsteadiness on feet [R26.81]   DATE OF ONSET: about 3 months ago  REFERRING PHYSICIAN: Luz Coleman MD  RETURN PHYSICIAN APPOINTMENT: TBD     INITIAL ASSESSMENT:  Mr. Rosemary Galloway presents to PT with referral for treatment in the pool due to severe arthritis of the R hip, as well as RLE weakness and difficulty walking. He has recently had home health PT to help with mobility. He has multiple medical co-morbidities, including difficulty with stairs that may make getting into and out of our pool very difficult. We discussed an initial plan of PT in the gym, to help improve flexibility and general strength, then probable transition to the pool. He seems well motivated; wife is supportive. I expect progress to be slow due to multiple medical problems and decreased tolerance for exercise. PROBLEM LIST (Impacting functional limitations):  1. Decreased Strength  2. Decreased ADL/Functional Activities  3. Decreased Ambulation Ability/Technique  4. Decreased Balance  5. Increased Pain  6. Decreased Activity Tolerance  7. Decreased Flexibility/Joint Mobility  8. Decreased [place] with Home Exercise Program INTERVENTIONS PLANNED:  1. Balance Exercise  2. Bed Mobility  3. Gait Training  4. Home Exercise Program (HEP)  5. Range of Motion (ROM)  6. Therapeutic Exercise/Strengthening   7. Aquatics   TREATMENT PLAN:  Effective Dates: 2017 TO 3-1-2018.   Frequency/Duration: 2 times a week for 8 weeks, and upon reassessment will adjust frequency and duration as progress indicates. GOALS: (Goals have been discussed and agreed upon with patient.)  Short-Term Functional Goals: Time Frame: 4 weeks  1. Improve R hip PROM to 90 + ° for ease in sitting and rising  2. Improve R hip strength to allow 1 x 10 SLR  3. Improve ease of moving to allow pt to get in and out of bed without asst  Discharge Goals: Time Frame: 12 weeks  1. Pt able to don socks and shoes with no more than set up asst  2. Improve strength and stability BLEs to allow reciprocal stair ascending  3. Pt to tolerate 30+ min exercise in the pool without increased sx  4. Pt to be independent in basic HEP for ROM and strengthening ex  5. Rehabilitation Potential For Stated Goals: [id number] [facility], I certify that the treatment plan above will be carried out by a therapist or under their direction. Thank you for this referral,  Aj Mora PT     Referring Physician Signature: Kaleigh Cabrera MD              Date                    The information in this section was collected on 12-5 (except where otherwise noted). 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 His pain and weakness got much worse suddenly after MRI--he blames it on having to lie on the hard table for so long. Since that time about 2 months ago, his pain is worse and weakness is worse as well. Past Medical History/Comorbidities:   Mr. Natanael Pascal  has a past medical history of Arthritis; Atrial fibrillation ([address] CAD (coronary artery disease); Cancer ([address] DM (diabetes mellitus), type 2 ([address] Family history of prostate problems; GERD (gastroesophageal reflux disease); Hearing difficulty; HLD (hyperlipidemia); Hypertension; Hypothyroidism; Insulin dependent diabetes mellitus ([address]  Kidney problem; and Systolic heart failure [facility]).  Mr. Tremayne Hernandez  has a past surgical history that includes coronary artery bypass graft; other surgical; other surgical; cataract removal (04/2011); tonsillectomy; hernia repair (08/2004); appendectomy (08/2004); prostatectomy (03/01/2006); cardiac surg procedure unlist (06/2007); aortic valve replacement (11/10/2011); cardiac surg procedure unlist (11/14/2011); cardiac surg procedure unlist (05/10/2013); orthopaedic (1973); and knee replacement (04/25/2016). R TKA 2015. CABG in 2007. Pacemaker and defibrillator in 2015, due to silent MI. Pt reports that he is in Afib all the time. Has IDDM, aortic calcification, HTN. Social History/Living Environment:     . Lives with wife in 1 level house. Has only  Lived in [place] 1 yr, moved from [place]  Prior Level of Function/Work/Activity:  Had been more mobile, with less pain prior to this episode of hip pain. Retired. Dominant Side:         RIGHT  Personal Factors:          Age:  81 y.o. Current Medications:       Current Outpatient Prescriptions:     potassium chloride (K-DUR, KLOR-CON) 20 mEq tablet, Take 1 Tab by mouth daily. , Disp: 90 Tab, Rfl: 3    tamsulosin (FLOMAX) 0.4 mg capsule, Take 1 Cap by mouth daily. , Disp: 90 Cap, Rfl: 3    traMADol-acetaminophen (ULTRACET) 37.5-325 mg per tablet, Take 1 Tab by mouth every six (6) hours as needed for Pain. Max Daily Amount: 4 Tabs., Disp: 30 Tab, Rfl: 2    GLUC/CHND/OM3/DHA/EPA/FISH/STR (GLUCOSAMINE CHONDROITIN PLUS PO), Take 2 Caplet by mouth daily. , Disp: , Rfl:     omeprazole (PRILOSEC) 20 mg capsule, Take 1 capsule by mouth  daily, Disp: 90 Cap, Rfl: 3    simvastatin (ZOCOR) 20 mg tablet, Take 1 tablet by mouth  nightly, Disp: 90 Tab, Rfl: 3    warfarin (COUMADIN) 5 mg tablet, 1 to 1&1/2 tabs every day or as directed (Patient taking differently: Take 5 mg by mouth daily.  1 to 1&1/2 tabs every day or as directed  Pt states he take 7.5mg Monday and Friday and 5mg on all other days), Disp: 120 Tab, Rfl: 3    insulin aspart (NOVOLOG) 100 unit/mL injection, by SubCUTAneous route as needed. 6 am, lunch 4 ; 6 supper-plus few units by scale if high, Disp: , Rfl:     clobetasol (TEMOVATE) 0.05 % ointment, Apply  to affected area two (2) times a day., Disp: 15 g, Rfl: 12    carvedilol (COREG) 6.25 mg tablet, Take 1 Tab by mouth two (2) times daily (with meals). , Disp: 180 Tab, Rfl: 2    furosemide (LASIX) 40 mg tablet, Take 1 Tab by mouth two (2) times a day., Disp: 180 Tab, Rfl: 2    levothyroxine (SYNTHROID) 125 mcg tablet, Take 1 Tab by mouth Daily (before breakfast). , Disp: 90 Tab, Rfl: 3    lisinopril (ZESTRIL) 2.5 mg tablet, Take 1 Tab by mouth daily. , Disp: 90 Tab, Rfl: 3    metOLazone (ZAROXOLYN) 2.5 mg tablet, Take 1 Tab by mouth as needed. , Disp: 90 Tab, Rfl: 1    lancets 33 gauge misc, Test BS TID/PRN Dx E11.9. Pt uses one touch delica 33 gauge lancets, Disp: 1 Package, Rfl: 12    Insulin Needles, Disposable, (BD INSULIN PEN NEEDLE UF MINI) 31 gauge x 3/16\" ndle, Use as directed, E11.9, Disp: 100 Pen Needle, Rfl: 12    lancets (ONETOUCH DELICA LANCETS) 30 gauge misc, Testing TID/PRN, E11.9, Disp: 1 Package, Rfl: 12    glucose blood VI test strips (ONETOUCH ULTRA TEST) strip, TEST BS TID AM DX E11.22, Disp: 100 Strip, Rfl: 12    aspirin delayed-release 81 mg tablet, Take 81 mg by mouth daily. , Disp: , Rfl:     multivitamin (ONE A DAY) tablet, Take 1 Tab by mouth daily. , Disp: , Rfl:     insulin glargine (LANTUS) 100 unit/mL injection, 16 Units by SubCUTAneous route. As directed , Disp: , Rfl:     ferrous sulfate (IRON) 325 mg (65 mg iron) tablet, Take 65 mg by mouth Daily (before breakfast). , Disp: , Rfl:    Date Last Reviewed:  12/6/2017   Number of Personal Factors/Comorbidities that affect the Plan of Care: 3+: HIGH COMPLEXITY   EXAMINATION:   Observation/Orthostatic Postural Assessment:          Pt is a large man, walks with rollator, sits comfortably on rollator.   Has slight difficulty with sit to stand. Sits with poor posture; stands with slight trunk/ hip flexion. Slightly [place]. Palpation:          Some bilat lower leg swelling. Flat area palpable at superior aspect of flexed R knee ( distal quad atrophy?). Varicose veins prominent in R leg. ROM:            PROM Date:   12-5 Date:   Date:     R hip flex ~ 85 °      R hip abd ~ 25 °      R hip ER ~ 20 °            AROM:  R flex ~ 35 °      R abd ~ 20 °                Strength:           Date:  12-5 Date:   Date:     R hip flex 3-     R   Hip abd 2- to 2     R knee extn 4-     R knee flex 3+ to 4-     R DF 4           L hip flex 3+     L knee extn 4     L knee flex 4-     L DF 4-         Neurological Screen:        Sensation: Pt denies any sensory changes, denies peripheral neuropathy in LEs  Functional Mobility:         Gait/Ambulation:  ambs with rollator, shuffling gait, slight lean forward. Can walk short distances without walker ( eg, to the toilet). Pt reports that on good days, he can walk as far as about 4-5 blocks. Bed Mobility:  Pt reports that he needs some asst at home to get legs into bed. On the mat in PT, he needed very min asst for R leg. Stairs:  No stairs in the home. Balance:          NT on initial assessment. Body Structures Involved:  1. Nerves  2. Bones  3. Joints  4. Muscles  5. Ligaments Body Functions Affected:  1. Mental  2. Sensory/Pain  3. Neuromusculoskeletal  4. Movement Related Activities and Participation Affected:  1. General Tasks and Demands  2. Mobility  3. Self Care  4. Domestic Life  5. Interpersonal Interactions and Relationships  6. Community, Social and [place]   Number of elements (examined above) that affect the Plan of Care: 3: MODERATE COMPLEXITY   CLINICAL PRESENTATION:   Presentation: Evolving clinical presentation with changing clinical characteristics: MODERATE COMPLEXITY   CLINICAL DECISION MAKING:   Outcome Measure:    Tool Used: Lower Extremity Functional Scale (LEFS)  Score:  Initial: 25/80 Most Recent: X/80 (Date: -- )   Interpretation of Score: 20 questions each scored on a 5 point scale with 0 representing \"extreme difficulty or unable to perform\" and 4 representing \"no difficulty\". The lower the score, the greater the functional disability. 80/80 represents no disability. Minimal detectable change is 9 points. Score 80 79-63 62-48 47-32 31-16 15-1 0   Modifier CH CI CJ CK CL CM CN     ? Mobility - Walking and Moving Around:     - CURRENT STATUS: CL - 60%-79% impaired, limited or restricted    - GOAL STATUS: CK - 40%-59% impaired, limited or restricted    - D/C STATUS:  ---------------To be determined---------------      Medical Necessity:   · Skilled intervention continues to be required due to lack of hip ROM and strength, which severely impairs function. Reason for Services/Other Comments:  · Patient continues to demonstrate capacity to improve flexibility, ROM and function which will increase independence. Use of outcome tool(s) and clinical judgement create a POC that gives a: Questionable prediction of patient's progress: MODERATE COMPLEXITY            TREATMENT:   (In addition to Assessment/Re-Assessment sessions the following treatments were rendered)  Pre-treatment Symptoms/Complaints:  Pt reports moderate R hip pain and weakness, limiting some ADLs and mobility. Pain: Initial: Pain Intensity 1: 4  Post Session:  4     Evaluation (X)  Therapeutic Exercise   ( 20 minutes ):  To decrease pain, improve flexibility and motion, and increase strength. Will provide verbal and manual cues as needed to ensure proper performance of the exercises. Will increase range of motion, resistance and intensity as pt tolerates.     Pt performed LE ex on the mat:    SLR--1 x 10 each leg  SLR--1 x 10 with L, min asst for R  Hip flexion from hooklying--2 x 10  Passive stretch for abd/ER, from hooklying  Reviewed standing ex pt has been given previously  Oriented pt to the pool     Modalities  (   ):      [employer]  Treatment/Session Assessment:    · Response to Treatment:  Pt seemed to enjoy some of the stretching. .Does not tolerate passive stretching very well. .  · Compliance with Program/Exercises: Will assess as treatment progresses. · Recommendations/Intent for next treatment session:   Next visit will focus on improving passive and active R hip motion and strength, to allow better function. .  Total Treatment Duration:   60  min  Total number of treatments:    1     PT Patient Time In/Time Out  Time In: 0200  Time Out: One Rosa Betancourt PT

## 2017-12-07 ENCOUNTER — APPOINTMENT (RX ONLY)
Dept: URBAN - METROPOLITAN AREA CLINIC 349 | Facility: CLINIC | Age: 82
Setting detail: DERMATOLOGY
End: 2017-12-07

## 2017-12-07 ENCOUNTER — HOSPITAL ENCOUNTER (OUTPATIENT)
Dept: PHYSICAL THERAPY | Age: 82
Discharge: HOME | End: 2017-12-07
Payer: MEDICARE

## 2017-12-07 DIAGNOSIS — L57.0 ACTINIC KERATOSIS: ICD-10-CM

## 2017-12-07 DIAGNOSIS — L21.8 OTHER SEBORRHEIC DERMATITIS: ICD-10-CM

## 2017-12-07 DIAGNOSIS — Z85.828 PERSONAL HISTORY OF OTHER MALIGNANT NEOPLASM OF SKIN: ICD-10-CM

## 2017-12-07 DIAGNOSIS — L57.8 OTHER SKIN CHANGES DUE TO CHRONIC EXPOSURE TO NONIONIZING RADIATION: ICD-10-CM | Status: IMPROVED

## 2017-12-07 PROCEDURE — 17000 DESTRUCT PREMALG LESION: CPT

## 2017-12-07 PROCEDURE — ? TREATMENT REGIMEN

## 2017-12-07 PROCEDURE — 17003 DESTRUCT PREMALG LES 2-14: CPT

## 2017-12-07 PROCEDURE — ? LIQUID NITROGEN

## 2017-12-07 PROCEDURE — 99213 OFFICE O/P EST LOW 20 MIN: CPT | Mod: 25

## 2017-12-07 PROCEDURE — 97110 THERAPEUTIC EXERCISES: CPT

## 2017-12-07 PROCEDURE — ? COUNSELING

## 2017-12-07 ASSESSMENT — LOCATION DETAILED DESCRIPTION DERM
LOCATION DETAILED: STERNAL NOTCH
LOCATION DETAILED: LEFT SUPERIOR OCCIPITAL SCALP
LOCATION DETAILED: LEFT INFERIOR FOREHEAD
LOCATION DETAILED: LEFT SUPERIOR MEDIAL FOREHEAD
LOCATION DETAILED: LEFT SUPERIOR PARIETAL SCALP
LOCATION DETAILED: RIGHT INFERIOR FOREHEAD
LOCATION DETAILED: LEFT MEDIAL FOREHEAD
LOCATION DETAILED: LEFT FOREHEAD
LOCATION DETAILED: LEFT SUPERIOR FOREHEAD
LOCATION DETAILED: LEFT SUPERIOR HELIX
LOCATION DETAILED: LEFT INFERIOR LATERAL FOREHEAD
LOCATION DETAILED: LEFT CENTRAL PARIETAL SCALP
LOCATION DETAILED: LEFT MEDIAL CANTHUS
LOCATION DETAILED: LEFT INFERIOR MEDIAL FOREHEAD
LOCATION DETAILED: LEFT LATERAL ZYGOMA
LOCATION DETAILED: POSTERIOR MID-PARIETAL SCALP

## 2017-12-07 ASSESSMENT — LOCATION ZONE DERM
LOCATION ZONE: EYELID
LOCATION ZONE: FACE
LOCATION ZONE: TRUNK
LOCATION ZONE: EAR
LOCATION ZONE: SCALP

## 2017-12-07 ASSESSMENT — LOCATION SIMPLE DESCRIPTION DERM
LOCATION SIMPLE: LEFT FOREHEAD
LOCATION SIMPLE: POSTERIOR SCALP
LOCATION SIMPLE: LEFT EAR
LOCATION SIMPLE: LEFT EYELID
LOCATION SIMPLE: SCALP
LOCATION SIMPLE: LEFT OCCIPITAL SCALP
LOCATION SIMPLE: CHEST
LOCATION SIMPLE: RIGHT FOREHEAD
LOCATION SIMPLE: LEFT ZYGOMA

## 2017-12-07 ASSESSMENT — PAIN INTENSITY VAS: HOW INTENSE IS YOUR PAIN 0 BEING NO PAIN, 10 BEING THE MOST SEVERE PAIN POSSIBLE?: 1/10 PAIN

## 2017-12-07 NOTE — PROCEDURE: LIQUID NITROGEN
Duration Of Freeze Thaw-Cycle (Seconds): 3
Detail Level: Detailed
Consent: The patient's consent was obtained including but not limited to risks of crusting, scabbing, blistering, scarring, darker or lighter pigmentary change, recurrence, incomplete removal and infection.
Number Of Freeze-Thaw Cycles: 2 freeze-thaw cycles
Render Post-Care Instructions In Note?: no
Post-Care Instructions: I reviewed with the patient in detail post-care instructions. Patient is to wear sunprotection, and avoid picking at any of the treated lesions. Pt may apply Vaseline to crusted or scabbing areas.

## 2017-12-07 NOTE — PROCEDURE: TREATMENT REGIMEN
Continue Regimen: Continue Aveeno moisturizing lotion as needed to forehead. May continue Ketoconazole if helps
Detail Level: Detailed

## 2017-12-07 NOTE — PROGRESS NOTES
Zhao Mcintosh  : 1935  [place] / [place] / Product Type: Medicare /  [address]  at [facility]  [address], [address]  [phone]   [phone]        OUTPATIENT PHYSICAL THERAPY:  Daily Note 2017    ICD-10: Treatment Diagnosis: M25.551  Pain in R hip  M25.561  Stiffness in R hip  R26.2  Difficulty walking  Precautions/Allergies:   Latex   Fall Risk Score: 2 (? 5 = High Risk)  MD Orders: eval and treat MEDICAL/REFERRING DIAGNOSIS:  Unsteadiness on feet [R26.81]   DATE OF ONSET: about 3 months ago  REFERRING PHYSICIAN: Noemy Handley MD  RETURN PHYSICIAN APPOINTMENT: TBD     INITIAL ASSESSMENT:  Mr. Garrett Anne presents to PT with referral for treatment in the pool due to severe arthritis of the R hip, as well as RLE weakness and difficulty walking. He has recently had home health PT to help with mobility. He has multiple medical co-morbidities, including difficulty with stairs that may make getting into and out of our pool very difficult. We discussed an initial plan of PT in the gym, to help improve flexibility and general strength, then probable transition to the pool. He seems well motivated; wife is supportive. I expect progress to be slow due to multiple medical problems and decreased tolerance for exercise. PROBLEM LIST (Impacting functional limitations):  1. Decreased Strength  2. Decreased ADL/Functional Activities  3. Decreased Ambulation Ability/Technique  4. Decreased Balance  5. Increased Pain  6. Decreased Activity Tolerance  7. Decreased Flexibility/Joint Mobility  8. Decreased [place] with Home Exercise Program INTERVENTIONS PLANNED:  1. Balance Exercise  2. Bed Mobility  3. Gait Training  4. Home Exercise Program (HEP)  5. Range of Motion (ROM)  6. Therapeutic Exercise/Strengthening   7. Aquatics   TREATMENT PLAN:  Effective Dates: 2017 TO 3-1-2018.   Frequency/Duration: 2 times a week for 8 weeks, and upon reassessment will adjust frequency and duration as progress indicates. GOALS: (Goals have been discussed and agreed upon with patient.)  Short-Term Functional Goals: Time Frame: 4 weeks  1. Improve R hip PROM to 90 + ° for ease in sitting and rising  2. Improve R hip strength to allow 1 x 10 SLR  3. Improve ease of moving to allow pt to get in and out of bed without asst  Discharge Goals: Time Frame: 12 weeks  1. Pt able to don socks and shoes with no more than set up asst  2. Improve strength and stability BLEs to allow reciprocal stair ascending  3. Pt to tolerate 30+ min exercise in the pool without increased sx  4. Pt to be independent in basic HEP for ROM and strengthening ex  5. Rehabilitation Potential For Stated Goals: [id number] [facility], I certify that the treatment plan above will be carried out by a therapist or under their direction. Thank you for this referral,  Edward Gaston, PT     Referring Physician Signature: Sheri Erazo MD              Date                    The information in this section was collected on 12-5 (except where otherwise noted). 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 His pain and weakness got much worse suddenly after MRI--he blames it on having to lie on the hard table for so long. Since that time about 2 months ago, his pain is worse and weakness is worse as well. Past Medical History/Comorbidities:   Mr. Keyona Finch  has a past medical history of Arthritis; Atrial fibrillation ([address] CAD (coronary artery disease); Cancer ([address] DM (diabetes mellitus), type 2 ([address] Family history of prostate problems; GERD (gastroesophageal reflux disease); Hearing difficulty; HLD (hyperlipidemia); Hypertension; Hypothyroidism; Insulin dependent diabetes mellitus ([address] Kidney problem; and Systolic heart failure ([address]    Marya Santamaria  has a past surgical history that includes coronary artery bypass graft; other surgical; other surgical; cataract removal (04/2011); tonsillectomy; hernia repair (08/2004); appendectomy (08/2004); prostatectomy (03/01/2006); cardiac surg procedure unlist (06/2007); aortic valve replacement (11/10/2011); cardiac surg procedure unlist (11/14/2011); cardiac surg procedure unlist (05/10/2013); orthopaedic (1973); and knee replacement (04/25/2016). R TKA 2015. CABG in 2007. Pacemaker and defibrillator in 2015, due to silent MI. Pt reports that he is in Afib all the time. Has IDDM, aortic calcification, HTN. Social History/Living Environment:     . Lives with wife in 1 level house. Has only  Lived in [place] 1 yr, moved from [place]  Prior Level of Function/Work/Activity:  Had been more mobile, with less pain prior to this episode of hip pain. Retired. Dominant Side:         RIGHT  Personal Factors:          Age:  81 y.o. Current Medications:       Current Outpatient Prescriptions:     potassium chloride (K-DUR, KLOR-CON) 20 mEq tablet, Take 1 Tab by mouth daily. , Disp: 90 Tab, Rfl: 3    tamsulosin (FLOMAX) 0.4 mg capsule, Take 1 Cap by mouth daily. , Disp: 90 Cap, Rfl: 3    traMADol-acetaminophen (ULTRACET) 37.5-325 mg per tablet, Take 1 Tab by mouth every six (6) hours as needed for Pain. Max Daily Amount: 4 Tabs., Disp: 30 Tab, Rfl: 2    GLUC/CHND/OM3/DHA/EPA/FISH/STR (GLUCOSAMINE CHONDROITIN PLUS PO), Take 2 Caplet by mouth daily. , Disp: , Rfl:     omeprazole (PRILOSEC) 20 mg capsule, Take 1 capsule by mouth  daily, Disp: 90 Cap, Rfl: 3    simvastatin (ZOCOR) 20 mg tablet, Take 1 tablet by mouth  nightly, Disp: 90 Tab, Rfl: 3    warfarin (COUMADIN) 5 mg tablet, 1 to 1&1/2 tabs every day or as directed (Patient taking differently: Take 5 mg by mouth daily.  1 to 1&1/2 tabs every day or as directed  Pt states he take 7.5mg Monday and Friday and 5mg on all other days), Disp: 120 Tab, Rfl: 3   insulin aspart (NOVOLOG) 100 unit/mL injection, by SubCUTAneous route as needed. 6 am, lunch 4 ; 6 supper-plus few units by scale if high, Disp: , Rfl:     clobetasol (TEMOVATE) 0.05 % ointment, Apply  to affected area two (2) times a day., Disp: 15 g, Rfl: 12    carvedilol (COREG) 6.25 mg tablet, Take 1 Tab by mouth two (2) times daily (with meals). , Disp: 180 Tab, Rfl: 2    furosemide (LASIX) 40 mg tablet, Take 1 Tab by mouth two (2) times a day., Disp: 180 Tab, Rfl: 2    levothyroxine (SYNTHROID) 125 mcg tablet, Take 1 Tab by mouth Daily (before breakfast). , Disp: 90 Tab, Rfl: 3    lisinopril (ZESTRIL) 2.5 mg tablet, Take 1 Tab by mouth daily. , Disp: 90 Tab, Rfl: 3    metOLazone (ZAROXOLYN) 2.5 mg tablet, Take 1 Tab by mouth as needed. , Disp: 90 Tab, Rfl: 1    lancets 33 gauge misc, Test BS TID/PRN Dx E11.9. Pt uses one touch delica 33 gauge lancets, Disp: 1 Package, Rfl: 12    Insulin Needles, Disposable, (BD INSULIN PEN NEEDLE UF MINI) 31 gauge x 3/16\" ndle, Use as directed, E11.9, Disp: 100 Pen Needle, Rfl: 12    lancets (ONETOUCH DELICA LANCETS) 30 gauge misc, Testing TID/PRN, E11.9, Disp: 1 Package, Rfl: 12    glucose blood VI test strips (ONETOUCH ULTRA TEST) strip, TEST BS TID AM DX E11.22, Disp: 100 Strip, Rfl: 12    aspirin delayed-release 81 mg tablet, Take 81 mg by mouth daily. , Disp: , Rfl:     multivitamin (ONE A DAY) tablet, Take 1 Tab by mouth daily. , Disp: , Rfl:     insulin glargine (LANTUS) 100 unit/mL injection, 16 Units by SubCUTAneous route. As directed , Disp: , Rfl:     ferrous sulfate (IRON) 325 mg (65 mg iron) tablet, Take 65 mg by mouth Daily (before breakfast). , Disp: , Rfl:    Date Last Reviewed:  12/7/2017   Number of Personal Factors/Comorbidities that affect the Plan of Care: 3+: HIGH COMPLEXITY   EXAMINATION:   Observation/Orthostatic Postural Assessment:          Pt is a large man, walks with rollator, sits comfortably on rollator.   Has slight difficulty with sit to stand.  Sits with poor posture; stands with slight trunk/ hip flexion. Slightly [place]. Palpation:          Some bilat lower leg swelling. Flat area palpable at superior aspect of flexed R knee ( distal quad atrophy?). Varicose veins prominent in R leg. ROM:            PROM Date:   12-5 Date:   Date:     R hip flex ~ 85 °      R hip abd ~ 25 °      R hip ER ~ 20 °            AROM:  R flex ~ 35 °      R abd ~ 20 °                Strength:           Date:  12-5 Date:   Date:     R hip flex 3-     R   Hip abd 2- to 2     R knee extn 4-     R knee flex 3+ to 4-     R DF 4           L hip flex 3+     L knee extn 4     L knee flex 4-     L DF 4-         Neurological Screen:        Sensation: Pt denies any sensory changes, denies peripheral neuropathy in LEs  Functional Mobility:         Gait/Ambulation:  ambs with rollator, shuffling gait, slight lean forward. Can walk short distances without walker ( eg, to the toilet). Pt reports that on good days, he can walk as far as about 4-5 blocks. Bed Mobility:  Pt reports that he needs some asst at home to get legs into bed. On the mat in PT, he needed very min asst for R leg. Stairs:  No stairs in the home. Balance:          NT on initial assessment. Body Structures Involved:  1. Nerves  2. Bones  3. Joints  4. Muscles  5. Ligaments Body Functions Affected:  1. Mental  2. Sensory/Pain  3. Neuromusculoskeletal  4. Movement Related Activities and Participation Affected:  1. General Tasks and Demands  2. Mobility  3. Self Care  4. Domestic Life  5. Interpersonal Interactions and Relationships  6. Community, Social and [place]   Number of elements (examined above) that affect the Plan of Care: 3: MODERATE COMPLEXITY   CLINICAL PRESENTATION:   Presentation: Evolving clinical presentation with changing clinical characteristics: MODERATE COMPLEXITY   CLINICAL DECISION MAKING:   Outcome Measure:    Tool Used: Lower Extremity Functional Scale (LEFS)  Score:  Initial: 25/80 Most Recent: X/80 (Date: -- )   Interpretation of Score: 20 questions each scored on a 5 point scale with 0 representing \"extreme difficulty or unable to perform\" and 4 representing \"no difficulty\". The lower the score, the greater the functional disability. 80/80 represents no disability. Minimal detectable change is 9 points. Score 80 79-63 62-48 47-32 31-16 15-1 0   Modifier CH CI CJ CK CL CM CN     ? Mobility - Walking and Moving Around:     - CURRENT STATUS: CL - 60%-79% impaired, limited or restricted    - GOAL STATUS: CK - 40%-59% impaired, limited or restricted    - D/C STATUS:  ---------------To be determined---------------      Medical Necessity:   · Skilled intervention continues to be required due to lack of hip ROM and strength, which severely impairs function. Reason for Services/Other Comments:  · Patient continues to demonstrate capacity to improve flexibility, ROM and function which will increase independence. Use of outcome tool(s) and clinical judgement create a POC that gives a: Questionable prediction of patient's progress: MODERATE COMPLEXITY            TREATMENT:   (In addition to Assessment/Re-Assessment sessions the following treatments were rendered)    Pre-treatment Symptoms/Complaints:  Pt reported that he was a little sore after last tx, but only for a short while. Feels like he is moving easier now. Pain: Initial: Pain Intensity 1: 4  Post Session:  4       Therapeutic Exercise   ( 60 minutes ):  To decrease pain, improve flexibility and motion, and increase strength. Will provide verbal and manual cues as needed to ensure proper performance of the exercises. Will increase range of motion, resistance and intensity as pt tolerates.     Nustep x 6 min  Repeated sit to stand from mat, not using hands  Pt performed LE ex on the mat:    SLR--1 x 10 with L, min asst for R  Bridging--1 x 10  Hip extn with legs on the physioball  [facility] rolling with legs  Hip flexion from hooklying--2 x 10  Passive stretch for abd/ER, from hooklying  Standing balance/ stabilization ex, isometrics against manual resistance with jian  PROM for hip flex and ER, abd  Standing hip flexion--19# on L, 12.5# on R--1 x 12     Modalities  (   ):      [employer]  Treatment/Session Assessment:  Pt tolerated tx well today. Already has shown some increased PROM at hip flexion and he reports slight improvement with mobility. He reported that he was able to walk through [facility] today without notable difficulty. He remains well motivated. Is expected to continue to make progress with range and function; will consider therapy in the pool as appropriate. · Response to Treatment:  Pt seemed to enjoy some of the stretching. .Does not tolerate passive stretching very well. .  · Compliance with Program/Exercises: Will assess as treatment progresses. · Recommendations/Intent for next treatment session:   Next visit will focus on improving passive and active R hip motion and strength, to allow better function. .  Total Treatment Duration:   60  min  Total number of treatments:    2     PT Patient Time In/Time Out  Time In: 0315  Time Out: [address], PT

## 2017-12-11 ENCOUNTER — HOSPITAL ENCOUNTER (OUTPATIENT)
Dept: PHYSICAL THERAPY | Age: 82
Discharge: HOME | End: 2017-12-11
Payer: MEDICARE

## 2017-12-11 PROCEDURE — 97110 THERAPEUTIC EXERCISES: CPT

## 2017-12-11 NOTE — PROGRESS NOTES
Willy Anoop  : 1935  [place] / [place] / Product Type: Medicare /  [address]  at [place] [address] Therapy  [address], [address]  [phone]   [phone]        OUTPATIENT PHYSICAL THERAPY:  Daily Note 2017    ICD-10: Treatment Diagnosis: M25.551  Pain in R hip  M25.561  Stiffness in R hip  R26.2  Difficulty walking  Precautions/Allergies:   Latex   Fall Risk Score: 2 (? 5 = High Risk)  MD Orders: eval and treat MEDICAL/REFERRING DIAGNOSIS:  Unsteadiness on feet [R26.81]   DATE OF ONSET: about 3 months ago  REFERRING PHYSICIAN: Rios Gonsales MD  RETURN PHYSICIAN APPOINTMENT: TBD     INITIAL ASSESSMENT:  Mr. Marya Santamaria presents to PT with referral for treatment in the pool due to severe arthritis of the R hip, as well as RLE weakness and difficulty walking. He has recently had home health PT to help with mobility. He has multiple medical co-morbidities, including difficulty with stairs that may make getting into and out of our pool very difficult. We discussed an initial plan of PT in the gym, to help improve flexibility and general strength, then probable transition to the pool. He seems well motivated; wife is supportive. I expect progress to be slow due to multiple medical problems and decreased tolerance for exercise. PROBLEM LIST (Impacting functional limitations):  1. Decreased Strength  2. Decreased ADL/Functional Activities  3. Decreased Ambulation Ability/Technique  4. Decreased Balance  5. Increased Pain  6. Decreased Activity Tolerance  7. Decreased Flexibility/Joint Mobility  8. Decreased [place] with Home Exercise Program INTERVENTIONS PLANNED:  1. Balance Exercise  2. Bed Mobility  3. Gait Training  4. Home Exercise Program (HEP)  5. Range of Motion (ROM)  6. Therapeutic Exercise/Strengthening   7. Aquatics   TREATMENT PLAN:  Effective Dates: 2017 TO 3-1-2018.   Frequency/Duration: 2 times a week for 8 weeks, and upon reassessment will adjust frequency and duration as progress indicates. GOALS: (Goals have been discussed and agreed upon with patient.)  Short-Term Functional Goals: Time Frame: 4 weeks  1. Improve R hip PROM to 90 + ° for ease in sitting and rising  2. Improve R hip strength to allow 1 x 10 SLR  3. Improve ease of moving to allow pt to get in and out of bed without asst  Discharge Goals: Time Frame: 12 weeks  1. Pt able to don socks and shoes with no more than set up asst  2. Improve strength and stability BLEs to allow reciprocal stair ascending  3. Pt to tolerate 30+ min exercise in the pool without increased sx  4. Pt to be independent in basic HEP for ROM and strengthening ex  5. Rehabilitation Potential For Stated Goals: [id number] [facility], I certify that the treatment plan above will be carried out by a therapist or under their direction. Thank you for this referral,  Iza Myers, PT     Referring Physician Signature: Jonny Hoffman MD              Date                    The information in this section was collected on 12-5 (except where otherwise noted). 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 His pain and weakness got much worse suddenly after MRI--he blames it on having to lie on the hard table for so long. Since that time about 2 months ago, his pain is worse and weakness is worse as well. Past Medical History/Comorbidities:   Mr. Ren Daniels  has a past medical history of Arthritis; Atrial fibrillation ([address] CAD (coronary artery disease); Cancer ([address] DM (diabetes mellitus), type 2 ([address] Family history of prostate problems; GERD (gastroesophageal reflux disease); Hearing difficulty; HLD (hyperlipidemia); Hypertension; Hypothyroidism; Insulin dependent diabetes mellitus ([address] Kidney problem; and Systolic heart failure ([address]    Jossy Morris  has a past surgical history that includes coronary artery bypass graft; other surgical; other surgical; cataract removal (04/2011); tonsillectomy; hernia repair (08/2004); appendectomy (08/2004); prostatectomy (03/01/2006); cardiac surg procedure unlist (06/2007); aortic valve replacement (11/10/2011); cardiac surg procedure unlist (11/14/2011); cardiac surg procedure unlist (05/10/2013); orthopaedic (1973); and knee replacement (04/25/2016). R TKA 2015. CABG in 2007. Pacemaker and defibrillator in 2015, due to silent MI. Pt reports that he is in Afib all the time. Has IDDM, aortic calcification, HTN. Social History/Living Environment:     . Lives with wife in 1 level house. Has only  Lived in [place] 1 yr, moved from [place]  Prior Level of Function/Work/Activity:  Had been more mobile, with less pain prior to this episode of hip pain. Retired. Dominant Side:         RIGHT  Personal Factors:          Age:  81 y.o. Current Medications:       Current Outpatient Prescriptions:     potassium chloride (K-DUR, KLOR-CON) 20 mEq tablet, Take 1 Tab by mouth daily. , Disp: 90 Tab, Rfl: 3    tamsulosin (FLOMAX) 0.4 mg capsule, Take 1 Cap by mouth daily. , Disp: 90 Cap, Rfl: 3    traMADol-acetaminophen (ULTRACET) 37.5-325 mg per tablet, Take 1 Tab by mouth every six (6) hours as needed for Pain. Max Daily Amount: 4 Tabs., Disp: 30 Tab, Rfl: 2    GLUC/CHND/OM3/DHA/EPA/FISH/STR (GLUCOSAMINE CHONDROITIN PLUS PO), Take 2 Caplet by mouth daily. , Disp: , Rfl:     omeprazole (PRILOSEC) 20 mg capsule, Take 1 capsule by mouth  daily, Disp: 90 Cap, Rfl: 3    simvastatin (ZOCOR) 20 mg tablet, Take 1 tablet by mouth  nightly, Disp: 90 Tab, Rfl: 3    warfarin (COUMADIN) 5 mg tablet, 1 to 1&1/2 tabs every day or as directed (Patient taking differently: Take 5 mg by mouth daily.  1 to 1&1/2 tabs every day or as directed  Pt states he take 7.5mg Monday and Friday and 5mg on all other days), Disp: 120 Tab, Rfl: 3   insulin aspart (NOVOLOG) 100 unit/mL injection, by SubCUTAneous route as needed. 6 am, lunch 4 ; 6 supper-plus few units by scale if high, Disp: , Rfl:     clobetasol (TEMOVATE) 0.05 % ointment, Apply  to affected area two (2) times a day., Disp: 15 g, Rfl: 12    carvedilol (COREG) 6.25 mg tablet, Take 1 Tab by mouth two (2) times daily (with meals). , Disp: 180 Tab, Rfl: 2    furosemide (LASIX) 40 mg tablet, Take 1 Tab by mouth two (2) times a day., Disp: 180 Tab, Rfl: 2    levothyroxine (SYNTHROID) 125 mcg tablet, Take 1 Tab by mouth Daily (before breakfast). , Disp: 90 Tab, Rfl: 3    lisinopril (ZESTRIL) 2.5 mg tablet, Take 1 Tab by mouth daily. , Disp: 90 Tab, Rfl: 3    metOLazone (ZAROXOLYN) 2.5 mg tablet, Take 1 Tab by mouth as needed. , Disp: 90 Tab, Rfl: 1    lancets 33 gauge misc, Test BS TID/PRN Dx E11.9. Pt uses one touch delica 33 gauge lancets, Disp: 1 Package, Rfl: 12    Insulin Needles, Disposable, (BD INSULIN PEN NEEDLE UF MINI) 31 gauge x 3/16\" ndle, Use as directed, E11.9, Disp: 100 Pen Needle, Rfl: 12    lancets (ONETOUCH DELICA LANCETS) 30 gauge misc, Testing TID/PRN, E11.9, Disp: 1 Package, Rfl: 12    glucose blood VI test strips (ONETOUCH ULTRA TEST) strip, TEST BS TID AM DX E11.22, Disp: 100 Strip, Rfl: 12    aspirin delayed-release 81 mg tablet, Take 81 mg by mouth daily. , Disp: , Rfl:     multivitamin (ONE A DAY) tablet, Take 1 Tab by mouth daily. , Disp: , Rfl:     insulin glargine (LANTUS) 100 unit/mL injection, 16 Units by SubCUTAneous route. As directed , Disp: , Rfl:     ferrous sulfate (IRON) 325 mg (65 mg iron) tablet, Take 65 mg by mouth Daily (before breakfast). , Disp: , Rfl:    Date Last Reviewed:  12/11/2017   Number of Personal Factors/Comorbidities that affect the Plan of Care: 3+: HIGH COMPLEXITY   EXAMINATION:   Observation/Orthostatic Postural Assessment:          Pt is a large man, walks with rollator, sits comfortably on rollator.   Has slight difficulty with sit to stand. Sits with poor posture; stands with slight trunk/ hip flexion. Slightly [place]. Palpation:          Some bilat lower leg swelling. Flat area palpable at superior aspect of flexed R knee ( distal quad atrophy?). Varicose veins prominent in R leg. ROM:            PROM Date:   12-5 Date:   Date:     R hip flex ~ 85 °      R hip abd ~ 25 °      R hip ER ~ 20 °            AROM:  R flex ~ 35 °      R abd ~ 20 °                Strength:           Date:  12-5 Date:   Date:     R hip flex 3-     R   Hip abd 2- to 2     R knee extn 4-     R knee flex 3+ to 4-     R DF 4           L hip flex 3+     L knee extn 4     L knee flex 4-     L DF 4-         Neurological Screen:        Sensation: Pt denies any sensory changes, denies peripheral neuropathy in LEs  Functional Mobility:         Gait/Ambulation:  ambs with rollator, shuffling gait, slight lean forward. Can walk short distances without walker ( eg, to the toilet). Pt reports that on good days, he can walk as far as about 4-5 blocks. Bed Mobility:  Pt reports that he needs some asst at home to get legs into bed. On the mat in PT, he needed very min asst for R leg. Stairs:  No stairs in the home. Balance:          NT on initial assessment. Body Structures Involved:  1. Nerves  2. Bones  3. Joints  4. Muscles  5. Ligaments Body Functions Affected:  1. Mental  2. Sensory/Pain  3. Neuromusculoskeletal  4. Movement Related Activities and Participation Affected:  1. General Tasks and Demands  2. Mobility  3. Self Care  4. Domestic Life  5. Interpersonal Interactions and Relationships  6. Community, Social and [place]   Number of elements (examined above) that affect the Plan of Care: 3: MODERATE COMPLEXITY   CLINICAL PRESENTATION:   Presentation: Evolving clinical presentation with changing clinical characteristics: MODERATE COMPLEXITY   CLINICAL DECISION MAKING:   Outcome Measure:    Tool Used: Lower Extremity Functional Scale (LEFS)  Score:  Initial: 25/80 Most Recent: X/80 (Date: -- )   Interpretation of Score: 20 questions each scored on a 5 point scale with 0 representing \"extreme difficulty or unable to perform\" and 4 representing \"no difficulty\". The lower the score, the greater the functional disability. 80/80 represents no disability. Minimal detectable change is 9 points. Score 80 79-63 62-48 47-32 31-16 15-1 0   Modifier CH CI CJ CK CL CM CN     ? Mobility - Walking and Moving Around:     - CURRENT STATUS: CL - 60%-79% impaired, limited or restricted    - GOAL STATUS: CK - 40%-59% impaired, limited or restricted    - D/C STATUS:  ---------------To be determined---------------      Medical Necessity:   · Skilled intervention continues to be required due to lack of hip ROM and strength, which severely impairs function. Reason for Services/Other Comments:  · Patient continues to demonstrate capacity to improve flexibility, ROM and function which will increase independence. Use of outcome tool(s) and clinical judgement create a POC that gives a: Questionable prediction of patient's progress: MODERATE COMPLEXITY            TREATMENT:   (In addition to Assessment/Re-Assessment sessions the following treatments were rendered)    Pre-treatment Symptoms/Complaints:  Pt reported that the soreness after exercise is bothering him less. Wife reports that she can tell that it is easier for him to move around now. Pain: Initial: Pain Intensity 1: 4  Post Session:  4       Therapeutic Exercise   ( 60 minutes ):  To decrease pain, improve flexibility and motion, and increase strength. Will provide verbal and manual cues as needed to ensure proper performance of the exercises. Will increase range of motion, resistance and intensity as pt tolerates.       Repeated sit to stand from mat, not using hands--2 x 10, in 2 bouts during this session  Pt performed LE ex on the mat:   Lower trunk rotation   SLR--1 x 15 with L, min asst for R  Bridging--1 x 10  Hip flexion from hooklying--2 x 10  Passive stretch for flexion and  abd/ER, from hooklying--nice gain in passive flexion range now to > 90 °   Standing balance/ stabilization ex, isometrics against manual resistance with jian  Standing hip flexion--19#--1 x 15  Standing hip abd--19#---1 x 15  Standing balance on airex--turning and against slight resistance     Modalities  (   ):      [employer]  Treatment/Session Assessment:  Pt tolerated tx well today. Pt has increased PROM at hip flexion and he reports slight improvement with mobility. Wife reports that he looks like he is moving easier at the house; she encourages him to walk in the neighborhood daily. Few mentions of R hip pain during therapy. He remains well motivated. Is expected to continue to make progress with range and function; will consider therapy in the pool as appropriate. · Response to Treatment:  Pt seemed to enjoy some of the stretching. .Does not tolerate passive stretching very well. .  · Compliance with Program/Exercises: seems to be compliant; is walking more for exercise. · Recommendations/Intent for next treatment session:   Next visit will focus on improving passive and active R hip motion and strength, to allow better function. .  Total Treatment Duration:   60  min  Total number of treatments:    3     PT Patient Time In/Time Out  Time In: 0300  Time Out: 0400    Srinivas Cm PT

## 2017-12-14 ENCOUNTER — HOSPITAL ENCOUNTER (OUTPATIENT)
Dept: PHYSICAL THERAPY | Age: 82
Discharge: HOME | End: 2017-12-14
Payer: MEDICARE

## 2017-12-14 PROCEDURE — 97110 THERAPEUTIC EXERCISES: CPT

## 2017-12-14 NOTE — PROGRESS NOTES
Alice Shine  : 1935  [place] / [place] / Product Type: Medicare /  [address]  at [place] [address] Therapy  [address], [address]  [phone]   [phone]        OUTPATIENT PHYSICAL THERAPY:  Daily Note 2017    ICD-10: Treatment Diagnosis: M25.551  Pain in R hip  M25.561  Stiffness in R hip  R26.2  Difficulty walking  Precautions/Allergies:   Latex   Fall Risk Score: 2 (? 5 = High Risk)  MD Orders: eval and treat MEDICAL/REFERRING DIAGNOSIS:  Unsteadiness on feet [R26.81]   DATE OF ONSET: about 3 months ago  REFERRING PHYSICIAN: Rajat Ramos MD  RETURN PHYSICIAN APPOINTMENT: TBD     INITIAL ASSESSMENT:  Mr. Jayne Galarza presents to PT with referral for treatment in the pool due to severe arthritis of the R hip, as well as RLE weakness and difficulty walking. He has recently had home health PT to help with mobility. He has multiple medical co-morbidities, including difficulty with stairs that may make getting into and out of our pool very difficult. We discussed an initial plan of PT in the gym, to help improve flexibility and general strength, then probable transition to the pool. He seems well motivated; wife is supportive. I expect progress to be slow due to multiple medical problems and decreased tolerance for exercise. PROBLEM LIST (Impacting functional limitations):  1. Decreased Strength  2. Decreased ADL/Functional Activities  3. Decreased Ambulation Ability/Technique  4. Decreased Balance  5. Increased Pain  6. Decreased Activity Tolerance  7. Decreased Flexibility/Joint Mobility  8. Decreased [place] with Home Exercise Program INTERVENTIONS PLANNED:  1. Balance Exercise  2. Bed Mobility  3. Gait Training  4. Home Exercise Program (HEP)  5. Range of Motion (ROM)  6. Therapeutic Exercise/Strengthening   7. Aquatics   TREATMENT PLAN:  Effective Dates: 2017 TO 3-1-2018.   Frequency/Duration: 2 times a week for 8 weeks, and upon reassessment will adjust frequency and duration as progress indicates. GOALS: (Goals have been discussed and agreed upon with patient.)  Short-Term Functional Goals: Time Frame: 4 weeks  1. Improve R hip PROM to 90 + ° for ease in sitting and rising  2. Improve R hip strength to allow 1 x 10 SLR  3. Improve ease of moving to allow pt to get in and out of bed without asst  Discharge Goals: Time Frame: 12 weeks  1. Pt able to don socks and shoes with no more than set up asst  2. Improve strength and stability BLEs to allow reciprocal stair ascending  3. Pt to tolerate 30+ min exercise in the pool without increased sx  4. Pt to be independent in basic HEP for ROM and strengthening ex  5. Rehabilitation Potential For Stated Goals: [id number] [facility] therapy, I certify that the treatment plan above will be carried out by a therapist or under their direction. Thank you for this referral,  Harsha Man, PT                 The information in this section was collected on 12-5 (except where otherwise noted). 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 His pain and weakness got much worse suddenly after MRI--he blames it on having to lie on the hard table for so long. Since that time about 2 months ago, his pain is worse and weakness is worse as well. Past Medical History/Comorbidities:   Mr. Garrett Anne  has a past medical history of Arthritis; Atrial fibrillation ([address] CAD (coronary artery disease); Cancer ([address] DM (diabetes mellitus), type 2 ([address] Family history of prostate problems; GERD (gastroesophageal reflux disease); Hearing difficulty; HLD (hyperlipidemia); Hypertension; Hypothyroidism; Insulin dependent diabetes mellitus ([address] Kidney problem; and Systolic heart failure ([address]   Mr. Garrett Anne  has a past surgical history that includes coronary artery bypass graft; other surgical; other surgical; cataract removal (04/2011); tonsillectomy; hernia repair (08/2004); appendectomy (08/2004); prostatectomy (03/01/2006); cardiac surg procedure unlist (06/2007); aortic valve replacement (11/10/2011); cardiac surg procedure unlist (11/14/2011); cardiac surg procedure unlist (05/10/2013); orthopaedic (1973); and knee replacement (04/25/2016). R TKA 2015. CABG in 2007. Pacemaker and defibrillator in 2015, due to silent MI. Pt reports that he is in Afib all the time. Has IDDM, aortic calcification, HTN. Social History/Living Environment:     . Lives with wife in 1 level house. Has only  Lived in [place] 1 yr, moved from [place]  Prior Level of Function/Work/Activity:  Had been more mobile, with less pain prior to this episode of hip pain. Retired. Dominant Side:         RIGHT  Personal Factors:          Age:  81 y.o. Current Medications:       Current Outpatient Prescriptions:     potassium chloride (K-DUR, KLOR-CON) 20 mEq tablet, Take 1 Tab by mouth daily. , Disp: 90 Tab, Rfl: 3    tamsulosin (FLOMAX) 0.4 mg capsule, Take 1 Cap by mouth daily. , Disp: 90 Cap, Rfl: 3    traMADol-acetaminophen (ULTRACET) 37.5-325 mg per tablet, Take 1 Tab by mouth every six (6) hours as needed for Pain. Max Daily Amount: 4 Tabs., Disp: 30 Tab, Rfl: 2    GLUC/CHND/OM3/DHA/EPA/FISH/STR (GLUCOSAMINE CHONDROITIN PLUS PO), Take 2 Caplet by mouth daily. , Disp: , Rfl:     omeprazole (PRILOSEC) 20 mg capsule, Take 1 capsule by mouth  daily, Disp: 90 Cap, Rfl: 3    simvastatin (ZOCOR) 20 mg tablet, Take 1 tablet by mouth  nightly, Disp: 90 Tab, Rfl: 3    warfarin (COUMADIN) 5 mg tablet, 1 to 1&1/2 tabs every day or as directed (Patient taking differently: Take 5 mg by mouth daily.  1 to 1&1/2 tabs every day or as directed  Pt states he take 7.5mg Monday and Friday and 5mg on all other days), Disp: 120 Tab, Rfl: 3    insulin aspart (NOVOLOG) 100 unit/mL injection, by SubCUTAneous route as needed. 6 am, lunch 4 ; 6 supper-plus few units by scale if high, Disp: , Rfl:     clobetasol (TEMOVATE) 0.05 % ointment, Apply  to affected area two (2) times a day., Disp: 15 g, Rfl: 12    carvedilol (COREG) 6.25 mg tablet, Take 1 Tab by mouth two (2) times daily (with meals). , Disp: 180 Tab, Rfl: 2    furosemide (LASIX) 40 mg tablet, Take 1 Tab by mouth two (2) times a day., Disp: 180 Tab, Rfl: 2    levothyroxine (SYNTHROID) 125 mcg tablet, Take 1 Tab by mouth Daily (before breakfast). , Disp: 90 Tab, Rfl: 3    lisinopril (ZESTRIL) 2.5 mg tablet, Take 1 Tab by mouth daily. , Disp: 90 Tab, Rfl: 3    metOLazone (ZAROXOLYN) 2.5 mg tablet, Take 1 Tab by mouth as needed. , Disp: 90 Tab, Rfl: 1    lancets 33 gauge misc, Test BS TID/PRN Dx E11.9. Pt uses one touch delica 33 gauge lancets, Disp: 1 Package, Rfl: 12    Insulin Needles, Disposable, (BD INSULIN PEN NEEDLE UF MINI) 31 gauge x 3/16\" ndle, Use as directed, E11.9, Disp: 100 Pen Needle, Rfl: 12    lancets (ONETOUCH DELICA LANCETS) 30 gauge misc, Testing TID/PRN, E11.9, Disp: 1 Package, Rfl: 12    glucose blood VI test strips (ONETOUCH ULTRA TEST) strip, TEST BS TID AM DX E11.22, Disp: 100 Strip, Rfl: 12    aspirin delayed-release 81 mg tablet, Take 81 mg by mouth daily. , Disp: , Rfl:     multivitamin (ONE A DAY) tablet, Take 1 Tab by mouth daily. , Disp: , Rfl:     insulin glargine (LANTUS) 100 unit/mL injection, 16 Units by SubCUTAneous route. As directed , Disp: , Rfl:     ferrous sulfate (IRON) 325 mg (65 mg iron) tablet, Take 65 mg by mouth Daily (before breakfast). , Disp: , Rfl:    Date Last Reviewed:  12/14/2017   Number of Personal Factors/Comorbidities that affect the Plan of Care: 3+: HIGH COMPLEXITY   EXAMINATION:   Observation/Orthostatic Postural Assessment:          Pt is a large man, walks with rollator, sits comfortably on rollator. Has slight difficulty with sit to stand.   Sits with poor posture; stands with slight trunk/ hip flexion. Slightly [place]. Palpation:          Some bilat lower leg swelling. Flat area palpable at superior aspect of flexed R knee ( distal quad atrophy?). Varicose veins prominent in R leg. ROM:            PROM Date:   12-5 Date:   Date:     R hip flex ~ 85 °      R hip abd ~ 25 °      R hip ER ~ 20 °            AROM:  R flex ~ 35 °      R abd ~ 20 °                Strength:           Date:  12-5 Date:   Date:     R hip flex 3-     R   Hip abd 2- to 2     R knee extn 4-     R knee flex 3+ to 4-     R DF 4           L hip flex 3+     L knee extn 4     L knee flex 4-     L DF 4-         Neurological Screen:        Sensation: Pt denies any sensory changes, denies peripheral neuropathy in LEs  Functional Mobility:         Gait/Ambulation:  ambs with rollator, shuffling gait, slight lean forward. Can walk short distances without walker ( eg, to the toilet). Pt reports that on good days, he can walk as far as about 4-5 blocks. Bed Mobility:  Pt reports that he needs some asst at home to get legs into bed. On the mat in PT, he needed very min asst for R leg. Stairs:  No stairs in the home. Balance:          NT on initial assessment. Body Structures Involved:  1. Nerves  2. Bones  3. Joints  4. Muscles  5. Ligaments Body Functions Affected:  1. Mental  2. Sensory/Pain  3. Neuromusculoskeletal  4. Movement Related Activities and Participation Affected:  1. General Tasks and Demands  2. Mobility  3. Self Care  4. Domestic Life  5. Interpersonal Interactions and Relationships  6. Community, Social and [place]   Number of elements (examined above) that affect the Plan of Care: 3: MODERATE COMPLEXITY   CLINICAL PRESENTATION:   Presentation: Evolving clinical presentation with changing clinical characteristics: MODERATE COMPLEXITY   CLINICAL DECISION MAKING:   Outcome Measure:    Tool Used: Lower Extremity Functional Scale (LEFS)  Score:  Initial: 25/80 Most Recent: X/80 (Date: -- ) Interpretation of Score: 20 questions each scored on a 5 point scale with 0 representing \"extreme difficulty or unable to perform\" and 4 representing \"no difficulty\". The lower the score, the greater the functional disability. 80/80 represents no disability. Minimal detectable change is 9 points. Score 80 79-63 62-48 47-32 31-16 15-1 0   Modifier CH CI CJ CK CL CM CN     ? Mobility - Walking and Moving Around:     - CURRENT STATUS: CL - 60%-79% impaired, limited or restricted    - GOAL STATUS: CK - 40%-59% impaired, limited or restricted    - D/C STATUS:  ---------------To be determined---------------      Medical Necessity:   · Skilled intervention continues to be required due to lack of hip ROM and strength, which severely impairs function. Reason for Services/Other Comments:  · Patient continues to demonstrate capacity to improve flexibility, ROM and function which will increase independence. Use of outcome tool(s) and clinical judgement create a POC that gives a: Questionable prediction of patient's progress: MODERATE COMPLEXITY            TREATMENT:   (In addition to Assessment/Re-Assessment sessions the following treatments were rendered)    Pre-treatment Symptoms/Complaints:  Pt reported that his walking is getting easier, and getting in the car is still hard, and getting out is easier. Pain: Initial: Pain Intensity 1: 4  Post Session:  4       Therapeutic Exercise   ( 60 minutes ):  To decrease pain, improve flexibility and motion, and increase strength. Will provide verbal and manual cues as needed to ensure proper performance of the exercises. Will increase range of motion, resistance and intensity as pt tolerates.     Nustep x 8 min  Lower trunk rotation ---1 x 15  SLR--2 x 15 with L, min asst for R  Bridging--1 x 15  Hip flexion from hooklying--2 x 15  Passive stretch for flexion in supine--no pain with stretch to  > 90 °   Standing balance/ stabilization ex  Standing hip flexion--19#--1 x 15  Standing hip abd--19#---1 x 15  Gait with st cane--75', VC for better heel strike     Modalities  (   ):      [employer]  Treatment/Session Assessment:  Pt tolerated tx well today. Pt has increased PROM at hip flexion and he reports slight improvement with mobility. Getting out of car is easier, and he reports that he is walking more at home. Wife reports that he looks like he is moving easier at the house; she encourages him to walk in the neighborhood daily. Few mentions of R hip pain during therapy. He remains well motivated. Is expected to continue to make progress with range and function; will consider therapy in the pool as appropriate. · Response to Treatment:  Pt seemed to enjoy some of the stretching. .  · Compliance with Program/Exercises: seems to be compliant; is walking more for exercise. · Recommendations/Intent for next treatment session:   Next visit will focus on improving passive and active R hip motion and strength, to allow better function. .  Total Treatment Duration:   60  min  Total number of treatments:    4     PT Patient Time In/Time Out  Time In: 9050  Time Out: C/Chase King Final, PT

## 2017-12-18 ENCOUNTER — HOSPITAL ENCOUNTER (OUTPATIENT)
Dept: PHYSICAL THERAPY | Age: 82
Discharge: HOME | End: 2017-12-18
Payer: MEDICARE

## 2017-12-18 NOTE — PROGRESS NOTES
Ryann Mix  : 1935  [place]  Secondary: [address] at [facility] Therapy  [address], [facility] [address]  [phone]   [phone]        OUTPATIENT DAILY NOTE    NAME/AGE/GENDER: Ryann Mix is a 81 y.o. male. DATE: 2017    Patient called to cancel appointment today due to a .  Will plan to follow up on next scheduled visit on Thursday.     Art Goel, PT

## 2017-12-21 ENCOUNTER — HOSPITAL ENCOUNTER (OUTPATIENT)
Dept: PHYSICAL THERAPY | Age: 82
Discharge: HOME | End: 2017-12-21
Payer: MEDICARE

## 2017-12-21 PROCEDURE — 97110 THERAPEUTIC EXERCISES: CPT

## 2017-12-21 NOTE — PROGRESS NOTES
Eliel Chavez  : 1935  [place] / [place] / Product Type: Medicare /  [address]  at [place] [id number] Therapy  [address] [place] [address], [address]  [phone]   [phone]        OUTPATIENT PHYSICAL THERAPY:  Daily Note 2017    ICD-10: Treatment Diagnosis: M25.551  Pain in R hip  M25.561  Stiffness in R hip  R26.2  Difficulty walking  Precautions/Allergies:   Latex   Fall Risk Score: 2 (? 5 = High Risk)  MD Orders: eval and treat MEDICAL/REFERRING DIAGNOSIS:  Unsteadiness on feet [R26.81]   DATE OF ONSET: about 3 months ago  REFERRING PHYSICIAN: Ciera Ghosh MD  RETURN PHYSICIAN APPOINTMENT: TBD     INITIAL ASSESSMENT:  Mr. Ruby Clarke presents to PT with referral for treatment in the pool due to severe arthritis of the R hip, as well as RLE weakness and difficulty walking. He has recently had home health PT to help with mobility. He has multiple medical co-morbidities, including difficulty with stairs that may make getting into and out of our pool very difficult. We discussed an initial plan of PT in the gym, to help improve flexibility and general strength, then probable transition to the pool. He seems well motivated; wife is supportive. I expect progress to be slow due to multiple medical problems and decreased tolerance for exercise. PROBLEM LIST (Impacting functional limitations):  1. Decreased Strength  2. Decreased ADL/Functional Activities  3. Decreased Ambulation Ability/Technique  4. Decreased Balance  5. Increased Pain  6. Decreased Activity Tolerance  7. Decreased Flexibility/Joint Mobility  8. Decreased [place] with Home Exercise Program INTERVENTIONS PLANNED:  1. Balance Exercise  2. Bed Mobility  3. Gait Training  4. Home Exercise Program (HEP)  5. Range of Motion (ROM)  6. Therapeutic Exercise/Strengthening   7. Aquatics   TREATMENT PLAN:  Effective Dates: 2017 TO 3-1-2018.   Frequency/Duration: 2 times a week for 8 weeks, and upon reassessment will adjust frequency and duration as progress indicates. GOALS: (Goals have been discussed and agreed upon with patient.)  Short-Term Functional Goals: Time Frame: 4 weeks  1. Improve R hip PROM to 90 + ° for ease in sitting and rising  2. Improve R hip strength to allow 1 x 10 SLR  3. Improve ease of moving to allow pt to get in and out of bed without asst  Discharge Goals: Time Frame: 12 weeks  1. Pt able to don socks and shoes with no more than set up asst  2. Improve strength and stability BLEs to allow reciprocal stair ascending  3. Pt to tolerate 30+ min exercise in the pool without increased sx  4. Pt to be independent in basic HEP for ROM and strengthening ex  5. Rehabilitation Potential For Stated Goals: [id number] [facility] therapy, I certify that the treatment plan above will be carried out by a therapist or under their direction. Thank you for this referral,  Aashish Fitzgerald, PT                 The information in this section was collected on 12-5 (except where otherwise noted). 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 His pain and weakness got much worse suddenly after MRI--he blames it on having to lie on the hard table for so long. Since that time about 2 months ago, his pain is worse and weakness is worse as well. Past Medical History/Comorbidities:   Mr. Jared Ramsey  has a past medical history of Arthritis; Atrial fibrillation ([address] CAD (coronary artery disease); Cancer ([address] DM (diabetes mellitus), type 2 ([address] Family history of prostate problems; GERD (gastroesophageal reflux disease); Hearing difficulty; HLD (hyperlipidemia); Hypertension; Hypothyroidism; Insulin dependent diabetes mellitus ([address] Kidney problem; and Systolic heart failure ([address]   Mr. Jared Ramsey  has a past surgical history that includes hx coronary artery bypass graft; hx other surgical; hx other surgical; hx cataract removal (04/2011); hx tonsillectomy; hx hernia repair (08/2004); hx appendectomy (08/2004); hx prostatectomy (03/01/2006); pr cardiac surg procedure unlist (06/2007); hx aortic valve replacement (11/10/2011); pr cardiac surg procedure unlist (11/14/2011); pr cardiac surg procedure unlist (05/10/2013); hx orthopaedic (1973); and hx knee replacement (04/25/2016). R TKA 2015. CABG in 2007. Pacemaker and defibrillator in 2015, due to silent MI. Pt reports that he is in Afib all the time. Has IDDM, aortic calcification, HTN. Social History/Living Environment:     . Lives with wife in 1 level house. Has only  Lived in [place] 1 yr, moved from [place]  Prior Level of Function/Work/Activity:  Had been more mobile, with less pain prior to this episode of hip pain. Retired. Dominant Side:         RIGHT  Personal Factors:          Age:  81 y.o. Current Medications:       Current Outpatient Prescriptions:     furosemide (LASIX) 40 mg tablet, TAKE 1 TABLET BY MOUTH TWO  TIMES DAILY, Disp: 180 Tab, Rfl: 3    carvedilol (COREG) 6.25 mg tablet, TAKE 1 TABLET BY MOUTH TWO  TIMES DAILY WITH MEALS, Disp: 180 Tab, Rfl: 3    potassium chloride (K-DUR, KLOR-CON) 20 mEq tablet, Take 1 Tab by mouth daily. , Disp: 90 Tab, Rfl: 3    tamsulosin (FLOMAX) 0.4 mg capsule, Take 1 Cap by mouth daily. , Disp: 90 Cap, Rfl: 3    traMADol-acetaminophen (ULTRACET) 37.5-325 mg per tablet, Take 1 Tab by mouth every six (6) hours as needed for Pain. Max Daily Amount: 4 Tabs., Disp: 30 Tab, Rfl: 2    GLUC/CHND/OM3/DHA/EPA/FISH/STR (GLUCOSAMINE CHONDROITIN PLUS PO), Take 2 Caplet by mouth daily. , Disp: , Rfl:     omeprazole (PRILOSEC) 20 mg capsule, Take 1 capsule by mouth  daily, Disp: 90 Cap, Rfl: 3    simvastatin (ZOCOR) 20 mg tablet, Take 1 tablet by mouth  nightly, Disp: 90 Tab, Rfl: 3    warfarin (COUMADIN) 5 mg tablet, 1 to 1&1/2 tabs every day or as directed (Patient taking differently: Take 5 mg by mouth daily. 1 to 1&1/2 tabs every day or as directed  Pt states he take 7.5mg Monday and Friday and 5mg on all other days), Disp: 120 Tab, Rfl: 3    insulin aspart (NOVOLOG) 100 unit/mL injection, by SubCUTAneous route as needed. 6 am, lunch 4 ; 6 supper-plus few units by scale if high, Disp: , Rfl:     clobetasol (TEMOVATE) 0.05 % ointment, Apply  to affected area two (2) times a day., Disp: 15 g, Rfl: 12    levothyroxine (SYNTHROID) 125 mcg tablet, Take 1 Tab by mouth Daily (before breakfast). , Disp: 90 Tab, Rfl: 3    lisinopril (ZESTRIL) 2.5 mg tablet, Take 1 Tab by mouth daily. , Disp: 90 Tab, Rfl: 3    metOLazone (ZAROXOLYN) 2.5 mg tablet, Take 1 Tab by mouth as needed. , Disp: 90 Tab, Rfl: 1    lancets 33 gauge misc, Test BS TID/PRN Dx E11.9. Pt uses one touch delica 33 gauge lancets, Disp: 1 Package, Rfl: 12    Insulin Needles, Disposable, (BD INSULIN PEN NEEDLE UF MINI) 31 gauge x 3/16\" ndle, Use as directed, E11.9, Disp: 100 Pen Needle, Rfl: 12    lancets (ONETOUCH DELICA LANCETS) 30 gauge misc, Testing TID/PRN, E11.9, Disp: 1 Package, Rfl: 12    glucose blood VI test strips (ONETOUCH ULTRA TEST) strip, TEST BS TID AM DX E11.22, Disp: 100 Strip, Rfl: 12    aspirin delayed-release 81 mg tablet, Take 81 mg by mouth daily. , Disp: , Rfl:     multivitamin (ONE A DAY) tablet, Take 1 Tab by mouth daily. , Disp: , Rfl:     insulin glargine (LANTUS) 100 unit/mL injection, 16 Units by SubCUTAneous route. As directed , Disp: , Rfl:     ferrous sulfate (IRON) 325 mg (65 mg iron) tablet, Take 65 mg by mouth Daily (before breakfast). , Disp: , Rfl:    Date Last Reviewed:  12/21/2017   Number of Personal Factors/Comorbidities that affect the Plan of Care: 3+: HIGH COMPLEXITY   EXAMINATION:   Observation/Orthostatic Postural Assessment:          Pt is a large man, walks with rollator, sits comfortably on rollator. Has slight difficulty with sit to stand.   Sits with poor posture; stands with slight trunk/ hip flexion. Slightly [place]. Palpation:          Some bilat lower leg swelling. Flat area palpable at superior aspect of flexed R knee ( distal quad atrophy?). Varicose veins prominent in R leg. ROM:            PROM Date:   12-5 Date:   Date:     R hip flex ~ 85 °      R hip abd ~ 25 °      R hip ER ~ 20 °            AROM:  R flex ~ 35 °      R abd ~ 20 °                Strength:           Date:  12-5 Date:   Date:     R hip flex 3-     R   Hip abd 2- to 2     R knee extn 4-     R knee flex 3+ to 4-     R DF 4           L hip flex 3+     L knee extn 4     L knee flex 4-     L DF 4-         Neurological Screen:        Sensation: Pt denies any sensory changes, denies peripheral neuropathy in LEs  Functional Mobility:         Gait/Ambulation:  ambs with rollator, shuffling gait, slight lean forward. Can walk short distances without walker ( eg, to the toilet). Pt reports that on good days, he can walk as far as about 4-5 blocks. Bed Mobility:  Pt reports that he needs some asst at home to get legs into bed. On the mat in PT, he needed very min asst for R leg. Stairs:  No stairs in the home. Balance:          NT on initial assessment. Body Structures Involved:  1. Nerves  2. Bones  3. Joints  4. Muscles  5. Ligaments Body Functions Affected:  1. Mental  2. Sensory/Pain  3. Neuromusculoskeletal  4. Movement Related Activities and Participation Affected:  1. General Tasks and Demands  2. Mobility  3. Self Care  4. Domestic Life  5. Interpersonal Interactions and Relationships  6. Community, Social and [place]   Number of elements (examined above) that affect the Plan of Care: 3: MODERATE COMPLEXITY   CLINICAL PRESENTATION:   Presentation: Evolving clinical presentation with changing clinical characteristics: MODERATE COMPLEXITY   CLINICAL DECISION MAKING:   Outcome Measure:    Tool Used: Lower Extremity Functional Scale (LEFS)  Score:  Initial: 25/80 Most Recent: X/80 (Date: -- )   Interpretation of Score: 20 questions each scored on a 5 point scale with 0 representing \"extreme difficulty or unable to perform\" and 4 representing \"no difficulty\". The lower the score, the greater the functional disability. 80/80 represents no disability. Minimal detectable change is 9 points. Score 80 79-63 62-48 47-32 31-16 15-1 0   Modifier CH CI CJ CK CL CM CN     ? Mobility - Walking and Moving Around:     - CURRENT STATUS: CL - 60%-79% impaired, limited or restricted    - GOAL STATUS: CK - 40%-59% impaired, limited or restricted    - D/C STATUS:  ---------------To be determined---------------      Medical Necessity:   · Skilled intervention continues to be required due to lack of hip ROM and strength, which severely impairs function. Reason for Services/Other Comments:  · Patient continues to demonstrate capacity to improve flexibility, ROM and function which will increase independence. Use of outcome tool(s) and clinical judgement create a POC that gives a: Questionable prediction of patient's progress: MODERATE COMPLEXITY            TREATMENT:   (In addition to Assessment/Re-Assessment sessions the following treatments were rendered)    Pre-treatment Symptoms/Complaints:  Pt reported that he seems to be less achy now and getting his R leg into the bed has gotten easier. Pain: Initial: Pain Intensity 1: 3  Post Session:  4       Therapeutic Exercise   (55 minutes ):  To decrease pain, improve flexibility and motion, and increase strength. Will provide verbal and manual cues as needed to ensure proper performance of the exercises. Will increase range of motion, resistance and intensity as pt tolerates.     Nustep x 10 min  Lower trunk rotation ---1 x 15  SLR--2 x 15 with L, min asst for R  Bridging--1 x 10, with LEs on physioball  Hip flexion from starting posn off the table-2 x 15  Passive stretch for flexion in supine--no pain with stretch to  > 90 °   Seated HS curls--25#--2 x 15  Standing balance/ stabilization ex  Lateral walking--did not need any physical asst  Worked on some gait training without asst device, cues to decrease lateral motion     Modalities  (   ):      Create Portal  Treatment/Session Assessment:  Pt tolerated tx well today. Pt has increased PROM at hip flexion and he reports improvement with mobility. He says he seems less achy now. Getting out of car is easier. Few mentions of R hip pain during therapy. He remains well motivated. Is expected to continue to make progress with range and function; will consider therapy in the pool as appropriate. · Response to Treatment:  Pt seemed to enjoy some of the stretching. .  · Compliance with Program/Exercises: seems to be compliant; is walking more for exercise. · Recommendations/Intent for next treatment session:   Next visit will focus on improving passive and active R hip motion and strength, to allow better function. .  Total Treatment Duration:  55  min  Total number of treatments:   5     PT Patient Time In/Time Out  Time In: 4622  Time Out: C/Chase King Final, PT

## 2017-12-28 ENCOUNTER — HOSPITAL ENCOUNTER (OUTPATIENT)
Dept: PHYSICAL THERAPY | Age: 82
Discharge: HOME | End: 2017-12-28
Payer: MEDICARE

## 2017-12-28 PROCEDURE — 97110 THERAPEUTIC EXERCISES: CPT

## 2017-12-28 NOTE — PROGRESS NOTES
Catrachita Allison  : 1935  [place] / [place] / Product Type: Medicare /  [address]  at [address], [facility] [address]  [phone]   [phone]        OUTPATIENT PHYSICAL THERAPY:  Daily Note 2017    ICD-10: Treatment Diagnosis: M25.551  Pain in R hip  M25.561  Stiffness in R hip  R26.2  Difficulty walking  Precautions/Allergies:   Latex   Fall Risk Score: 2 (? 5 = High Risk)  MD Orders: eval and treat MEDICAL/REFERRING DIAGNOSIS:  Unsteadiness on feet [R26.81]   DATE OF ONSET: about 3 months ago  REFERRING PHYSICIAN: Fran Carrington MD  RETURN PHYSICIAN APPOINTMENT: TBD     INITIAL ASSESSMENT:  Mr. Dayami Damon presents to PT with referral for treatment in the pool due to severe arthritis of the R hip, as well as RLE weakness and difficulty walking. He has recently had home health PT to help with mobility. He has multiple medical co-morbidities, including difficulty with stairs that may make getting into and out of our pool very difficult. We discussed an initial plan of PT in the gym, to help improve flexibility and general strength, then probable transition to the pool. He seems well motivated; wife is supportive. I expect progress to be slow due to multiple medical problems and decreased tolerance for exercise. PROBLEM LIST (Impacting functional limitations):  1. Decreased Strength  2. Decreased ADL/Functional Activities  3. Decreased Ambulation Ability/Technique  4. Decreased Balance  5. Increased Pain  6. Decreased Activity Tolerance  7. Decreased Flexibility/Joint Mobility  8. Decreased [place] with Home Exercise Program INTERVENTIONS PLANNED:  1. Balance Exercise  2. Bed Mobility  3. Gait Training  4. Home Exercise Program (HEP)  5. Range of Motion (ROM)  6. Therapeutic Exercise/Strengthening   7. Aquatics   TREATMENT PLAN:  Effective Dates: 2017 TO 3-1-2018.   Frequency/Duration: 2 times a week for 8 weeks, and upon reassessment will adjust frequency and duration as progress indicates. GOALS: (Goals have been discussed and agreed upon with patient.)  Short-Term Functional Goals: Time Frame: 4 weeks  1. Improve R hip PROM to 90 + ° for ease in sitting and rising  2. Improve R hip strength to allow 1 x 10 SLR  3. Improve ease of moving to allow pt to get in and out of bed without asst  Discharge Goals: Time Frame: 12 weeks  1. Pt able to don socks and shoes with no more than set up asst  2. Improve strength and stability BLEs to allow reciprocal stair ascending  3. Pt to tolerate 30+ min exercise in the pool without increased sx  4. Pt to be independent in basic HEP for ROM and strengthening ex  5. Rehabilitation Potential For Stated Goals: [address] [facility] therapy, I certify that the treatment plan above will be carried out by a therapist or under their direction. Thank you for this referral,                   The information in this section was collected on 12-5 (except where otherwise noted). 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 His pain and weakness got much worse suddenly after MRI--he blames it on having to lie on the hard table for so long. Since that time about 2 months ago, his pain is worse and weakness is worse as well. Past Medical History/Comorbidities:   Mr. Ren Parham  has a past medical history of Arthritis; Atrial fibrillation ([address] CAD (coronary artery disease); Cancer ([address] DM (diabetes mellitus), type 2 ([address] Family history of prostate problems; GERD (gastroesophageal reflux disease); Hearing difficulty; HLD (hyperlipidemia); Hypertension; Hypothyroidism; Insulin dependent diabetes mellitus ([address] Kidney problem; and Systolic heart failure ([address]   Mr. Ren Parham  has a past surgical history that includes hx coronary artery bypass graft; hx other surgical; hx other surgical; hx cataract removal (04/2011); hx tonsillectomy; hx hernia repair (08/2004); hx appendectomy (08/2004); hx prostatectomy (03/01/2006); pr cardiac surg procedure unlist (06/2007); hx aortic valve replacement (11/10/2011); pr cardiac surg procedure unlist (11/14/2011); pr cardiac surg procedure unlist (05/10/2013); hx orthopaedic (1973); and hx knee replacement (04/25/2016). R TKA 2015. CABG in 2007. Pacemaker and defibrillator in 2015, due to silent MI. Pt reports that he is in Afib all the time. Has IDDM, aortic calcification, HTN. Social History/Living Environment:     . Lives with wife in 1 level house. Has only  Lived in [place] 1 yr, moved from [place]  Prior Level of Function/Work/Activity:  Had been more mobile, with less pain prior to this episode of hip pain. Retired. Dominant Side:         RIGHT  Personal Factors:          Age:  81 y.o. Current Medications:       Current Outpatient Prescriptions:     furosemide (LASIX) 40 mg tablet, TAKE 1 TABLET BY MOUTH TWO  TIMES DAILY, Disp: 180 Tab, Rfl: 3    carvedilol (COREG) 6.25 mg tablet, TAKE 1 TABLET BY MOUTH TWO  TIMES DAILY WITH MEALS, Disp: 180 Tab, Rfl: 3    potassium chloride (K-DUR, KLOR-CON) 20 mEq tablet, Take 1 Tab by mouth daily. , Disp: 90 Tab, Rfl: 3    tamsulosin (FLOMAX) 0.4 mg capsule, Take 1 Cap by mouth daily. , Disp: 90 Cap, Rfl: 3    traMADol-acetaminophen (ULTRACET) 37.5-325 mg per tablet, Take 1 Tab by mouth every six (6) hours as needed for Pain. Max Daily Amount: 4 Tabs., Disp: 30 Tab, Rfl: 2    GLUC/CHND/OM3/DHA/EPA/FISH/STR (GLUCOSAMINE CHONDROITIN PLUS PO), Take 2 Caplet by mouth daily. , Disp: , Rfl:     omeprazole (PRILOSEC) 20 mg capsule, Take 1 capsule by mouth  daily, Disp: 90 Cap, Rfl: 3    simvastatin (ZOCOR) 20 mg tablet, Take 1 tablet by mouth  nightly, Disp: 90 Tab, Rfl: 3    warfarin (COUMADIN) 5 mg tablet, 1 to 1&1/2 tabs every day or as directed (Patient taking differently: Take 5 mg by mouth daily. 1 to 1&1/2 tabs every day or as directed  Pt states he take 7.5mg Monday and Friday and 5mg on all other days), Disp: 120 Tab, Rfl: 3    insulin aspart (NOVOLOG) 100 unit/mL injection, by SubCUTAneous route as needed. 6 am, lunch 4 ; 6 supper-plus few units by scale if high, Disp: , Rfl:     clobetasol (TEMOVATE) 0.05 % ointment, Apply  to affected area two (2) times a day., Disp: 15 g, Rfl: 12    levothyroxine (SYNTHROID) 125 mcg tablet, Take 1 Tab by mouth Daily (before breakfast). , Disp: 90 Tab, Rfl: 3    lisinopril (ZESTRIL) 2.5 mg tablet, Take 1 Tab by mouth daily. , Disp: 90 Tab, Rfl: 3    metOLazone (ZAROXOLYN) 2.5 mg tablet, Take 1 Tab by mouth as needed. , Disp: 90 Tab, Rfl: 1    lancets 33 gauge misc, Test BS TID/PRN Dx E11.9. Pt uses one touch delica 33 gauge lancets, Disp: 1 Package, Rfl: 12    Insulin Needles, Disposable, (BD INSULIN PEN NEEDLE UF MINI) 31 gauge x 3/16\" ndle, Use as directed, E11.9, Disp: 100 Pen Needle, Rfl: 12    lancets (ONETOUCH DELICA LANCETS) 30 gauge misc, Testing TID/PRN, E11.9, Disp: 1 Package, Rfl: 12    glucose blood VI test strips (ONETOUCH ULTRA TEST) strip, TEST BS TID AM DX E11.22, Disp: 100 Strip, Rfl: 12    aspirin delayed-release 81 mg tablet, Take 81 mg by mouth daily. , Disp: , Rfl:     multivitamin (ONE A DAY) tablet, Take 1 Tab by mouth daily. , Disp: , Rfl:     insulin glargine (LANTUS) 100 unit/mL injection, 16 Units by SubCUTAneous route. As directed , Disp: , Rfl:     ferrous sulfate (IRON) 325 mg (65 mg iron) tablet, Take 65 mg by mouth Daily (before breakfast). , Disp: , Rfl:    Date Last Reviewed:  12/28/2017   Number of Personal Factors/Comorbidities that affect the Plan of Care: 3+: HIGH COMPLEXITY   EXAMINATION:   Observation/Orthostatic Postural Assessment:          Pt is a large man, walks with rollator, sits comfortably on rollator. Has slight difficulty with sit to stand.   Sits with poor posture; stands with slight trunk/ hip flexion. Slightly [place]. Palpation:          Some bilat lower leg swelling. Flat area palpable at superior aspect of flexed R knee ( distal quad atrophy?). Varicose veins prominent in R leg. ROM:            PROM Date:   12-5 Date:   Date:     R hip flex ~ 85 °      R hip abd ~ 25 °      R hip ER ~ 20 °            AROM:  R flex ~ 35 °      R abd ~ 20 °                Strength:           Date:  12-5 Date:   Date:     R hip flex 3-     R   Hip abd 2- to 2     R knee extn 4-     R knee flex 3+ to 4-     R DF 4           L hip flex 3+     L knee extn 4     L knee flex 4-     L DF 4-         Neurological Screen:        Sensation: Pt denies any sensory changes, denies peripheral neuropathy in LEs  Functional Mobility:         Gait/Ambulation:  ambs with rollator, shuffling gait, slight lean forward. Can walk short distances without walker ( eg, to the toilet). Pt reports that on good days, he can walk as far as about 4-5 blocks. Bed Mobility:  Pt reports that he needs some asst at home to get legs into bed. On the mat in PT, he needed very min asst for R leg. Stairs:  No stairs in the home. Balance:          NT on initial assessment. Body Structures Involved:  1. Nerves  2. Bones  3. Joints  4. Muscles  5. Ligaments Body Functions Affected:  1. Mental  2. Sensory/Pain  3. Neuromusculoskeletal  4. Movement Related Activities and Participation Affected:  1. General Tasks and Demands  2. Mobility  3. Self Care  4. Domestic Life  5. Interpersonal Interactions and Relationships  6. Community, Social and [place]   Number of elements (examined above) that affect the Plan of Care: 3: MODERATE COMPLEXITY   CLINICAL PRESENTATION:   Presentation: Evolving clinical presentation with changing clinical characteristics: MODERATE COMPLEXITY   CLINICAL DECISION MAKING:   Outcome Measure:    Tool Used: Lower Extremity Functional Scale (LEFS)  Score:  Initial: 25/80 Most Recent: X/80 (Date: -- )   Interpretation of Score: 20 questions each scored on a 5 point scale with 0 representing \"extreme difficulty or unable to perform\" and 4 representing \"no difficulty\". The lower the score, the greater the functional disability. 80/80 represents no disability. Minimal detectable change is 9 points. Score 80 79-63 62-48 47-32 31-16 15-1 0   Modifier CH CI CJ CK CL CM CN     ? Mobility - Walking and Moving Around:     - CURRENT STATUS: CL - 60%-79% impaired, limited or restricted    - GOAL STATUS: CK - 40%-59% impaired, limited or restricted    - D/C STATUS:  ---------------To be determined---------------      Medical Necessity:   · Skilled intervention continues to be required due to lack of hip ROM and strength, which severely impairs function. Reason for Services/Other Comments:  · Patient continues to demonstrate capacity to improve flexibility, ROM and function which will increase independence. Use of outcome tool(s) and clinical judgement create a POC that gives a: Questionable prediction of patient's progress: MODERATE COMPLEXITY            TREATMENT:   (In addition to Assessment/Re-Assessment sessions the following treatments were rendered)    Pre-treatment Symptoms/Complaints:  Pt reports that he has been able to get in and out of bed easier and walk better. Pain: Initial: Pain Intensity 1: 3  Post Session:  2       Therapeutic Exercise   (60 minutes ):  To decrease pain, improve flexibility and motion, and increase strength. Will provide verbal and manual cues as needed to ensure proper performance of the exercises. Will increase range of motion, resistance and intensity as pt tolerates.     Nustep x 10 min  Lower trunk rotation ---1 x 15  SLR--2 x 15 with L, min asst for R  Bridging--1 x 10, with LEs on physioball  Hip flexion from starting posn off the table-2 x 15  Passive stretch for flexion in supine--no pain with stretch to  > 90 °   Seated HS curls--25#--2 x 15  Standing balance/ stabilization ex  Lateral walking--did not need any physical asst  Worked on some gait training without asst device, cues to decrease lateral motion  Hamstring machine # 20 3 x 10  Step ups on 6 inch step x 25     Modalities  (   ):      foodjunky  Treatment/Session Assessment:  Pt tolerated tx well today. Patient demonstrated good effort with all exercises and seems pleased with his progress. Patient was able to get into car today with no assistance and no discomfort. He remains well motivated. Good compliance with home exercise. · Response to Treatment:  Pt seemed to enjoy some of the stretching. .  · Compliance with Program/Exercises: seems to be compliant; is walking more for exercise. · Recommendations/Intent for next treatment session:   Next visit will focus on improving passive and active R hip motion and strength, to allow better function. .  Total Treatment Duration:  60  min  Total number of treatments:   6     PT Patient Time In/Time Out  Time In: 0300  Time Out: 205 [facility]

## 2018-01-02 ENCOUNTER — HOSPITAL ENCOUNTER (OUTPATIENT)
Dept: PHYSICAL THERAPY | Age: 83
Discharge: HOME | End: 2018-01-02
Payer: MEDICARE

## 2018-01-02 PROCEDURE — 97110 THERAPEUTIC EXERCISES: CPT

## 2018-01-02 NOTE — PROGRESS NOTES
Gloria Tran  : 1935  [place] / [place] / Product Type: Medicare /  [address] [place]  at [facility]  [address], [facility] [address]  [phone]   [phone]        OUTPATIENT PHYSICAL THERAPY:  Daily Note 2018    ICD-10: Treatment Diagnosis: M25.551  Pain in R hip  M25.561  Stiffness in R hip  R26.2  Difficulty walking  Precautions/Allergies:   Latex   Fall Risk Score: 2 (? 5 = High Risk)  MD Orders: eval and treat MEDICAL/REFERRING DIAGNOSIS:  Unsteadiness on feet [R26.81]   DATE OF ONSET: about 3 months ago  REFERRING PHYSICIAN: Polo Hemphill MD  RETURN PHYSICIAN APPOINTMENT: TBD     INITIAL ASSESSMENT:  Mr. Austin Pennington presents to PT with referral for treatment in the pool due to severe arthritis of the R hip, as well as RLE weakness and difficulty walking. He has recently had home health PT to help with mobility. He has multiple medical co-morbidities, including difficulty with stairs that may make getting into and out of our pool very difficult. We discussed an initial plan of PT in the gym, to help improve flexibility and general strength, then probable transition to the pool. He seems well motivated; wife is supportive. I expect progress to be slow due to multiple medical problems and decreased tolerance for exercise. PROBLEM LIST (Impacting functional limitations):  1. Decreased Strength  2. Decreased ADL/Functional Activities  3. Decreased Ambulation Ability/Technique  4. Decreased Balance  5. Increased Pain  6. Decreased Activity Tolerance  7. Decreased Flexibility/Joint Mobility  8. Decreased [place] with Home Exercise Program INTERVENTIONS PLANNED:  1. Balance Exercise  2. Bed Mobility  3. Gait Training  4. Home Exercise Program (HEP)  5. Range of Motion (ROM)  6. Therapeutic Exercise/Strengthening   7. Aquatics   TREATMENT PLAN:  Effective Dates: 2017 TO 3-1-2018.   Frequency/Duration: 2 times a week for 8 weeks, and upon reassessment will adjust frequency and duration as progress indicates. GOALS: (Goals have been discussed and agreed upon with patient.)  Short-Term Functional Goals: Time Frame: 4 weeks  1. Improve R hip PROM to 90 + ° for ease in sitting and rising  2. Improve R hip strength to allow 1 x 10 SLR  3. Improve ease of moving to allow pt to get in and out of bed without asst  Discharge Goals: Time Frame: 12 weeks  1. Pt able to don socks and shoes with no more than set up asst  2. Improve strength and stability BLEs to allow reciprocal stair ascending  3. Pt to tolerate 30+ min exercise in the pool without increased sx  4. Pt to be independent in basic HEP for ROM and strengthening ex  5. Rehabilitation Potential For Stated Goals: [id number] [facility] therapy, I certify that the treatment plan above will be carried out by a therapist or under their direction. Thank you for this referral,                   The information in this section was collected on 12-5 (except where otherwise noted). 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 His pain and weakness got much worse suddenly after MRI--he blames it on having to lie on the hard table for so long. Since that time about 2 months ago, his pain is worse and weakness is worse as well. Past Medical History/Comorbidities:   Mr. Paz Mcnally  has a past medical history of Arthritis; Atrial fibrillation ([address] CAD (coronary artery disease); Cancer ([address] DM (diabetes mellitus), type 2 ([address] Family history of prostate problems; GERD (gastroesophageal reflux disease); Hearing difficulty; HLD (hyperlipidemia); Hypertension; Hypothyroidism; Insulin dependent diabetes mellitus ([address] Kidney problem; and Systolic heart failure ([address]   Mr. Paz Mcnally  has a past surgical history that includes hx coronary artery bypass graft; hx other surgical; hx other surgical; hx cataract removal (04/2011); hx tonsillectomy; hx hernia repair (08/2004); hx appendectomy (08/2004); hx prostatectomy (03/01/2006); pr cardiac surg procedure unlist (06/2007); hx aortic valve replacement (11/10/2011); pr cardiac surg procedure unlist (11/14/2011); pr cardiac surg procedure unlist (05/10/2013); hx orthopaedic (1973); and hx knee replacement (04/25/2016). R TKA 2015. CABG in 2007. Pacemaker and defibrillator in 2015, due to silent MI. Pt reports that he is in Afib all the time. Has IDDM, aortic calcification, HTN. Social History/Living Environment:     . Lives with wife in 1 level house. Has only  Lived in [place] 1 yr, moved from [place]  Prior Level of Function/Work/Activity:  Had been more mobile, with less pain prior to this episode of hip pain. Retired. Dominant Side:         RIGHT  Personal Factors:          Age:  81 y.o. Current Medications:       Current Outpatient Prescriptions:     furosemide (LASIX) 40 mg tablet, TAKE 1 TABLET BY MOUTH TWO  TIMES DAILY, Disp: 180 Tab, Rfl: 3    carvedilol (COREG) 6.25 mg tablet, TAKE 1 TABLET BY MOUTH TWO  TIMES DAILY WITH MEALS, Disp: 180 Tab, Rfl: 3    potassium chloride (K-DUR, KLOR-CON) 20 mEq tablet, Take 1 Tab by mouth daily. , Disp: 90 Tab, Rfl: 3    tamsulosin (FLOMAX) 0.4 mg capsule, Take 1 Cap by mouth daily. , Disp: 90 Cap, Rfl: 3    traMADol-acetaminophen (ULTRACET) 37.5-325 mg per tablet, Take 1 Tab by mouth every six (6) hours as needed for Pain. Max Daily Amount: 4 Tabs., Disp: 30 Tab, Rfl: 2    GLUC/CHND/OM3/DHA/EPA/FISH/STR (GLUCOSAMINE CHONDROITIN PLUS PO), Take 2 Caplet by mouth daily. , Disp: , Rfl:     omeprazole (PRILOSEC) 20 mg capsule, Take 1 capsule by mouth  daily, Disp: 90 Cap, Rfl: 3    simvastatin (ZOCOR) 20 mg tablet, Take 1 tablet by mouth  nightly, Disp: 90 Tab, Rfl: 3    warfarin (COUMADIN) 5 mg tablet, 1 to 1&1/2 tabs every day or as directed (Patient taking differently: Take 5 mg by mouth daily. 1 to 1&1/2 tabs every day or as directed  Pt states he take 7.5mg Monday and Friday and 5mg on all other days), Disp: 120 Tab, Rfl: 3    insulin aspart (NOVOLOG) 100 unit/mL injection, by SubCUTAneous route as needed. 6 am, lunch 4 ; 6 supper-plus few units by scale if high, Disp: , Rfl:     clobetasol (TEMOVATE) 0.05 % ointment, Apply  to affected area two (2) times a day., Disp: 15 g, Rfl: 12    levothyroxine (SYNTHROID) 125 mcg tablet, Take 1 Tab by mouth Daily (before breakfast). , Disp: 90 Tab, Rfl: 3    lisinopril (ZESTRIL) 2.5 mg tablet, Take 1 Tab by mouth daily. , Disp: 90 Tab, Rfl: 3    metOLazone (ZAROXOLYN) 2.5 mg tablet, Take 1 Tab by mouth as needed. , Disp: 90 Tab, Rfl: 1    lancets 33 gauge misc, Test BS TID/PRN Dx E11.9. Pt uses one touch delica 33 gauge lancets, Disp: 1 Package, Rfl: 12    Insulin Needles, Disposable, (BD INSULIN PEN NEEDLE UF MINI) 31 gauge x 3/16\" ndle, Use as directed, E11.9, Disp: 100 Pen Needle, Rfl: 12    lancets (ONETOUCH DELICA LANCETS) 30 gauge misc, Testing TID/PRN, E11.9, Disp: 1 Package, Rfl: 12    glucose blood VI test strips (ONETOUCH ULTRA TEST) strip, TEST BS TID AM DX E11.22, Disp: 100 Strip, Rfl: 12    aspirin delayed-release 81 mg tablet, Take 81 mg by mouth daily. , Disp: , Rfl:     multivitamin (ONE A DAY) tablet, Take 1 Tab by mouth daily. , Disp: , Rfl:     insulin glargine (LANTUS) 100 unit/mL injection, 16 Units by SubCUTAneous route. As directed , Disp: , Rfl:     ferrous sulfate (IRON) 325 mg (65 mg iron) tablet, Take 65 mg by mouth Daily (before breakfast). , Disp: , Rfl:    Date Last Reviewed:  1/2/2018   Number of Personal Factors/Comorbidities that affect the Plan of Care: 3+: HIGH COMPLEXITY   EXAMINATION:   Observation/Orthostatic Postural Assessment:          Pt is a large man, walks with rollator, sits comfortably on rollator. Has slight difficulty with sit to stand.   Sits with poor posture; stands with slight trunk/ hip flexion. Slightly [place]. Palpation:          Some bilat lower leg swelling. Flat area palpable at superior aspect of flexed R knee ( distal quad atrophy?). Varicose veins prominent in R leg. ROM:            PROM Date:   12-5 Date:   Date:     R hip flex ~ 85 °      R hip abd ~ 25 °      R hip ER ~ 20 °            AROM:  R flex ~ 35 °      R abd ~ 20 °                Strength:           Date:  12-5 Date:   Date:     R hip flex 3-     R   Hip abd 2- to 2     R knee extn 4-     R knee flex 3+ to 4-     R DF 4           L hip flex 3+     L knee extn 4     L knee flex 4-     L DF 4-         Neurological Screen:        Sensation: Pt denies any sensory changes, denies peripheral neuropathy in LEs  Functional Mobility:         Gait/Ambulation:  ambs with rollator, shuffling gait, slight lean forward. Can walk short distances without walker ( eg, to the toilet). Pt reports that on good days, he can walk as far as about 4-5 blocks. Bed Mobility:  Pt reports that he needs some asst at home to get legs into bed. On the mat in PT, he needed very min asst for R leg. Stairs:  No stairs in the home. Balance:          NT on initial assessment. Body Structures Involved:  1. Nerves  2. Bones  3. Joints  4. Muscles  5. Ligaments Body Functions Affected:  1. Mental  2. Sensory/Pain  3. Neuromusculoskeletal  4. Movement Related Activities and Participation Affected:  1. General Tasks and Demands  2. Mobility  3. Self Care  4. Domestic Life  5. Interpersonal Interactions and Relationships  6. Community, Social and [place]   Number of elements (examined above) that affect the Plan of Care: 3: MODERATE COMPLEXITY   CLINICAL PRESENTATION:   Presentation: Evolving clinical presentation with changing clinical characteristics: MODERATE COMPLEXITY   CLINICAL DECISION MAKING:   Outcome Measure:    Tool Used: Lower Extremity Functional Scale (LEFS)  Score:  Initial: 25/80 Most Recent: X/80 (Date: -- )   Interpretation of Score: 20 questions each scored on a 5 point scale with 0 representing \"extreme difficulty or unable to perform\" and 4 representing \"no difficulty\". The lower the score, the greater the functional disability. 80/80 represents no disability. Minimal detectable change is 9 points. Score 80 79-63 62-48 47-32 31-16 15-1 0   Modifier CH CI CJ CK CL CM CN     ? Mobility - Walking and Moving Around:     - CURRENT STATUS: CL - 60%-79% impaired, limited or restricted    - GOAL STATUS: CK - 40%-59% impaired, limited or restricted    - D/C STATUS:  ---------------To be determined---------------      Medical Necessity:   · Skilled intervention continues to be required due to lack of hip ROM and strength, which severely impairs function. Reason for Services/Other Comments:  · Patient continues to demonstrate capacity to improve flexibility, ROM and function which will increase independence. Use of outcome tool(s) and clinical judgement create a POC that gives a: Questionable prediction of patient's progress: MODERATE COMPLEXITY            TREATMENT:   (In addition to Assessment/Re-Assessment sessions the following treatments were rendered)    Pre-treatment Symptoms/Complaints:  Pt reports that last treatment, which focused on his R knee, really seemed to help. He said he feels good about his progress with therapy. Pain: Initial: Pain Intensity 1: 3  Post Session:  2 to 1       Therapeutic Exercise   (60 minutes ):  To decrease pain, improve flexibility and motion, and increase strength. Will provide verbal and manual cues as needed to ensure proper performance of the exercises. Will increase range of motion, resistance and intensity as pt tolerates.     HC stretching  Seated knee extn--for eccentrics---20# bilat--1 x 12, 15# L leg only--1 x 10  Lower trunk rotation ---1 x 15  SLR--2 x 15 with L, min to contact guard asst for R  Bridging--1 x 10, , 5 sec hold  Passive stretch for flexion in supine--no pain with stretch to  > 90 °   Seated HS curls--25#--2 x 15  Hamstring machine 20#--- 3 x 10  Standing hip abd--19#--1 x 12  Standing hip flex--19#---1 x 15  Resisted walking---20#---forward and backward--1 x 12 each     Modalities  (   ):      [employer]  Treatment/Session Assessment:  Pt tolerated tx well today. Passive R hip range is getting easier and gaining slightly more range. He seems to be moving easier and reports that things at home are improving nicely, fely in and out of bed and car. Patient demonstrated good effort with all exercises and seems pleased with his progress. He remains well motivated. Good compliance with home exercise. · Response to Treatment:  Pt seemed to enjoy some of the stretching. Exercises seem to be easier, less obvious effort on his part. · Compliance with Program/Exercises: seems to be compliant; is walking more for exercise. · Recommendations/Intent for next treatment session:   Next visit will focus on improving passive and active R hip motion and strength, to allow better function. .  Total Treatment Duration:  60  min  Total number of treatments:  7     PT Patient Time In/Time Out  Time In: 4912  Time Out: 7209    Gigi Neil, PT,

## 2018-01-08 ENCOUNTER — HOSPITAL ENCOUNTER (OUTPATIENT)
Dept: PHYSICAL THERAPY | Age: 83
Discharge: HOME | End: 2018-01-08
Payer: MEDICARE

## 2018-01-08 PROCEDURE — 97110 THERAPEUTIC EXERCISES: CPT

## 2018-01-08 NOTE — PROGRESS NOTES
Jae Spencer  : 1935  [place] / [place] / Product Type: Medicare /  [address]  at [place] [id number] [address], [address]  [phone]   [phone]        OUTPATIENT PHYSICAL THERAPY:  Daily Note 2018    ICD-10: Treatment Diagnosis: M25.551  Pain in R hip  M25.561  Stiffness in R hip  R26.2  Difficulty walking  Precautions/Allergies:   Latex   Fall Risk Score: 2 (? 5 = High Risk)  MD Orders: eval and treat MEDICAL/REFERRING DIAGNOSIS:  Unsteadiness on feet [R26.81]   DATE OF ONSET: about 3 months ago  REFERRING PHYSICIAN: Nader Montana MD  RETURN PHYSICIAN APPOINTMENT: TBD     INITIAL ASSESSMENT:  Mr. Ranjit Torres presents to PT with referral for treatment in the pool due to severe arthritis of the R hip, as well as RLE weakness and difficulty walking. He has recently had home health PT to help with mobility. He has multiple medical co-morbidities, including difficulty with stairs that may make getting into and out of our pool very difficult. We discussed an initial plan of PT in the gym, to help improve flexibility and general strength, then probable transition to the pool. He seems well motivated; wife is supportive. I expect progress to be slow due to multiple medical problems and decreased tolerance for exercise. PROBLEM LIST (Impacting functional limitations):  1. Decreased Strength  2. Decreased ADL/Functional Activities  3. Decreased Ambulation Ability/Technique  4. Decreased Balance  5. Increased Pain  6. Decreased Activity Tolerance  7. Decreased Flexibility/Joint Mobility  8. Decreased [place] with Home Exercise Program INTERVENTIONS PLANNED:  1. Balance Exercise  2. Bed Mobility  3. Gait Training  4. Home Exercise Program (HEP)  5. Range of Motion (ROM)  6. Therapeutic Exercise/Strengthening   7. Aquatics   TREATMENT PLAN:  Effective Dates: 2017 TO 3-1-2018.   Frequency/Duration: 2 times a week for 8 weeks, and upon reassessment will adjust frequency and duration as progress indicates. GOALS: (Goals have been discussed and agreed upon with patient.)  Short-Term Functional Goals: Time Frame: 4 weeks  1. Improve R hip PROM to 90 + ° for ease in sitting and rising  2. Improve R hip strength to allow 1 x 10 SLR  3. Improve ease of moving to allow pt to get in and out of bed without asst  Discharge Goals: Time Frame: 12 weeks  1. Pt able to don socks and shoes with no more than set up asst  2. Improve strength and stability BLEs to allow reciprocal stair ascending  3. Pt to tolerate 30+ min exercise in the pool without increased sx  4. Pt to be independent in basic HEP for ROM and strengthening ex  5. Rehabilitation Potential For Stated Goals: [id number] [facility] therapy, I certify that the treatment plan above will be carried out by a therapist or under their direction. Thank you for this referral,                   The information in this section was collected on 12-5 (except where otherwise noted). 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 His pain and weakness got much worse suddenly after MRI--he blames it on having to lie on the hard table for so long. Since that time about 2 months ago, his pain is worse and weakness is worse as well. Past Medical History/Comorbidities:   Mr. Anjana Gill  has a past medical history of Arthritis; Atrial fibrillation ([address] CAD (coronary artery disease); Cancer ([address] DM (diabetes mellitus), type 2 ([address] Family history of prostate problems; GERD (gastroesophageal reflux disease); Hearing difficulty; HLD (hyperlipidemia); Hypertension; Hypothyroidism; Insulin dependent diabetes mellitus ([address] Kidney problem; and Systolic heart failure ([address]   Mr. Anjana Gill  has a past surgical history that includes hx coronary artery bypass graft; hx other surgical; hx other surgical; hx cataract removal (04/2011); hx tonsillectomy; hx hernia repair (08/2004); hx appendectomy (08/2004); hx prostatectomy (03/01/2006); pr cardiac surg procedure unlist (06/2007); hx aortic valve replacement (11/10/2011); pr cardiac surg procedure unlist (11/14/2011); pr cardiac surg procedure unlist (05/10/2013); hx orthopaedic (1973); and hx knee replacement (04/25/2016). R TKA 2015. CABG in 2007. Pacemaker and defibrillator in 2015, due to silent MI. Pt reports that he is in Afib all the time. Has IDDM, aortic calcification, HTN. Social History/Living Environment:     . Lives with wife in 1 level house. Has only  Lived in [place] 1 yr, moved from [place]  Prior Level of Function/Work/Activity:  Had been more mobile, with less pain prior to this episode of hip pain. Retired. Dominant Side:         RIGHT  Personal Factors:          Age:  81 y.o. Current Medications:       Current Outpatient Prescriptions:     furosemide (LASIX) 40 mg tablet, TAKE 1 TABLET BY MOUTH TWO  TIMES DAILY, Disp: 180 Tab, Rfl: 3    carvedilol (COREG) 6.25 mg tablet, TAKE 1 TABLET BY MOUTH TWO  TIMES DAILY WITH MEALS, Disp: 180 Tab, Rfl: 3    potassium chloride (K-DUR, KLOR-CON) 20 mEq tablet, Take 1 Tab by mouth daily. , Disp: 90 Tab, Rfl: 3    tamsulosin (FLOMAX) 0.4 mg capsule, Take 1 Cap by mouth daily. , Disp: 90 Cap, Rfl: 3    traMADol-acetaminophen (ULTRACET) 37.5-325 mg per tablet, Take 1 Tab by mouth every six (6) hours as needed for Pain. Max Daily Amount: 4 Tabs., Disp: 30 Tab, Rfl: 2    GLUC/CHND/OM3/DHA/EPA/FISH/STR (GLUCOSAMINE CHONDROITIN PLUS PO), Take 2 Caplet by mouth daily. , Disp: , Rfl:     omeprazole (PRILOSEC) 20 mg capsule, Take 1 capsule by mouth  daily, Disp: 90 Cap, Rfl: 3    simvastatin (ZOCOR) 20 mg tablet, Take 1 tablet by mouth  nightly, Disp: 90 Tab, Rfl: 3    warfarin (COUMADIN) 5 mg tablet, 1 to 1&1/2 tabs every day or as directed (Patient taking differently: Take 5 mg by mouth daily. 1 to 1&1/2 tabs every day or as directed  Pt states he take 7.5mg Monday and Friday and 5mg on all other days), Disp: 120 Tab, Rfl: 3    insulin aspart (NOVOLOG) 100 unit/mL injection, by SubCUTAneous route as needed. 6 am, lunch 4 ; 6 supper-plus few units by scale if high, Disp: , Rfl:     clobetasol (TEMOVATE) 0.05 % ointment, Apply  to affected area two (2) times a day., Disp: 15 g, Rfl: 12    levothyroxine (SYNTHROID) 125 mcg tablet, Take 1 Tab by mouth Daily (before breakfast). , Disp: 90 Tab, Rfl: 3    lisinopril (ZESTRIL) 2.5 mg tablet, Take 1 Tab by mouth daily. , Disp: 90 Tab, Rfl: 3    metOLazone (ZAROXOLYN) 2.5 mg tablet, Take 1 Tab by mouth as needed. , Disp: 90 Tab, Rfl: 1    lancets 33 gauge misc, Test BS TID/PRN Dx E11.9. Pt uses one touch delica 33 gauge lancets, Disp: 1 Package, Rfl: 12    Insulin Needles, Disposable, (BD INSULIN PEN NEEDLE UF MINI) 31 gauge x 3/16\" ndle, Use as directed, E11.9, Disp: 100 Pen Needle, Rfl: 12    lancets (ONETOUCH DELICA LANCETS) 30 gauge misc, Testing TID/PRN, E11.9, Disp: 1 Package, Rfl: 12    glucose blood VI test strips (ONETOUCH ULTRA TEST) strip, TEST BS TID AM DX E11.22, Disp: 100 Strip, Rfl: 12    aspirin delayed-release 81 mg tablet, Take 81 mg by mouth daily. , Disp: , Rfl:     multivitamin (ONE A DAY) tablet, Take 1 Tab by mouth daily. , Disp: , Rfl:     insulin glargine (LANTUS) 100 unit/mL injection, 16 Units by SubCUTAneous route. As directed , Disp: , Rfl:     ferrous sulfate (IRON) 325 mg (65 mg iron) tablet, Take 65 mg by mouth Daily (before breakfast). , Disp: , Rfl:    Date Last Reviewed:  1/8/2018   Number of Personal Factors/Comorbidities that affect the Plan of Care: 3+: HIGH COMPLEXITY   EXAMINATION:   Observation/Orthostatic Postural Assessment:          Pt is a large man, walks with rollator, sits comfortably on rollator. Has slight difficulty with sit to stand.   Sits with poor posture; stands with slight trunk/ hip flexion. Slightly [place]. Palpation:          Some bilat lower leg swelling. Flat area palpable at superior aspect of flexed R knee ( distal quad atrophy?). Varicose veins prominent in R leg. ROM:            PROM Date:   12-5 Date:   Date:     R hip flex ~ 85 °      R hip abd ~ 25 °      R hip ER ~ 20 °            AROM:  R flex ~ 35 °      R abd ~ 20 °                Strength:           Date:  12-5 Date:   Date:     R hip flex 3-     R   Hip abd 2- to 2     R knee extn 4-     R knee flex 3+ to 4-     R DF 4           L hip flex 3+     L knee extn 4     L knee flex 4-     L DF 4-         Neurological Screen:        Sensation: Pt denies any sensory changes, denies peripheral neuropathy in LEs  Functional Mobility:         Gait/Ambulation:  ambs with rollator, shuffling gait, slight lean forward. Can walk short distances without walker ( eg, to the toilet). Pt reports that on good days, he can walk as far as about 4-5 blocks. Bed Mobility:  Pt reports that he needs some asst at home to get legs into bed. On the mat in PT, he needed very min asst for R leg. Stairs:  No stairs in the home. Balance:          NT on initial assessment. Body Structures Involved:  1. Nerves  2. Bones  3. Joints  4. Muscles  5. Ligaments Body Functions Affected:  1. Mental  2. Sensory/Pain  3. Neuromusculoskeletal  4. Movement Related Activities and Participation Affected:  1. General Tasks and Demands  2. Mobility  3. Self Care  4. Domestic Life  5. Interpersonal Interactions and Relationships  6. Community, Social and [place]   Number of elements (examined above) that affect the Plan of Care: 3: MODERATE COMPLEXITY   CLINICAL PRESENTATION:   Presentation: Evolving clinical presentation with changing clinical characteristics: MODERATE COMPLEXITY   CLINICAL DECISION MAKING:   Outcome Measure:    Tool Used: Lower Extremity Functional Scale (LEFS)  Score:  Initial: 25/80 Most Recent: X/80 (Date: -- )   Interpretation of Score: 20 questions each scored on a 5 point scale with 0 representing \"extreme difficulty or unable to perform\" and 4 representing \"no difficulty\". The lower the score, the greater the functional disability. 80/80 represents no disability. Minimal detectable change is 9 points. Score 80 79-63 62-48 47-32 31-16 15-1 0   Modifier CH CI CJ CK CL CM CN     ? Mobility - Walking and Moving Around:     - CURRENT STATUS: CL - 60%-79% impaired, limited or restricted    - GOAL STATUS: CK - 40%-59% impaired, limited or restricted    - D/C STATUS:  ---------------To be determined---------------      Medical Necessity:   · Skilled intervention continues to be required due to lack of hip ROM and strength, which severely impairs function. Reason for Services/Other Comments:  · Patient continues to demonstrate capacity to improve flexibility, ROM and function which will increase independence. Use of outcome tool(s) and clinical judgement create a POC that gives a: Questionable prediction of patient's progress: MODERATE COMPLEXITY            TREATMENT:   (In addition to Assessment/Re-Assessment sessions the following treatments were rendered)    Pre-treatment Symptoms/Complaints:  Pt reports that he has been able to get in and out of bed easier and walk better. Walking feels better but he still has some hip pain and stiffness. Pain: Initial: Pain Intensity 1: 2  Post Session:  2       Therapeutic Exercise   (45 minutes ):  To decrease pain, improve flexibility and motion, and increase strength. Will provide verbal and manual cues as needed to ensure proper performance of the exercises. Will increase range of motion, resistance and intensity as pt tolerates.     Standing UE ex with 3#  Seated HS curls--25#--3 x 15  Standing balance/ stabilization ex  Worked on some gait training without asst device, cues to decrease lateral motion  Step ups on 6 inch step x 25  Tried single leg pulls at cable--15#--pt not able to do this very well for hip flex with either leg. Did 2 x 10 each leg for extn  Alternating step touch--6\"--3# on ankles--cues for good hip flex  Lateral step downs, alternating, from 6\" step--3# on ankles. exercises below were held today, 1-8:  Lower trunk rotation ---1 x 15  SLR--2 x 15 with L, min asst for R  Bridging--1 x 10, with LEs on physioball  Hip flexion from starting posn off the table-2 x 15  Passive stretch for flexion in supine--no pain with stretch to  > 90 °   Modalities  (   ):      Skin Scan Portal  Treatment/Session Assessment:  Pt tolerated tx well today. Patient demonstrated good effort with all exercises and seems pleased with his progress. Discussed working toward walking without walker, and more work on standing balance. He remains well motivated. Good compliance with home exercise. · Response to Treatment:    Pt seemed to enjoy some of the stretching. .  · Compliance with Program/Exercises:     seems to be compliant; wife is pleased with his improvement. Her goal is that he can return to driving. · Recommendations/Intent for next treatment session:   Next visit will focus on improving passive and active R hip motion and strength, to allow better function. .  Total Treatment Duration:  45  min  Total number of treatments:   6     PT Patient Time In/Time Out  Time In: 0330  Time Out: [address], PT,

## 2018-01-11 ENCOUNTER — HOSPITAL ENCOUNTER (OUTPATIENT)
Dept: PHYSICAL THERAPY | Age: 83
Discharge: HOME | End: 2018-01-11
Payer: MEDICARE

## 2018-01-11 PROCEDURE — 97110 THERAPEUTIC EXERCISES: CPT

## 2018-01-11 PROCEDURE — G8979 MOBILITY GOAL STATUS: HCPCS

## 2018-01-11 PROCEDURE — G8978 MOBILITY CURRENT STATUS: HCPCS

## 2018-01-11 NOTE — PROGRESS NOTES
Ryann Mix  : 1935  [place] / [place] / Product Type: Medicare /  [address]  at [place] [address] Therapy  [address], [address]  [phone]   [phone]        OUTPATIENT PHYSICAL THERAPY:  Daily Note and Progress note   2018    ICD-10: Treatment Diagnosis: M25.551  Pain in R hip  M25.561  Stiffness in R hip  R26.2  Difficulty walking  Precautions/Allergies:   Latex   Fall Risk Score: 2 (? 5 = High Risk)  MD Orders: eval and treat MEDICAL/REFERRING DIAGNOSIS:  Unsteadiness on feet [R26.81]   DATE OF ONSET: about 3 months ago  REFERRING PHYSICIAN: Jeff Garrett MD  RETURN PHYSICIAN APPOINTMENT: TBD      ASSESSMENT:  Mr. Damon Avila presents to PT with referral for treatment in the pool due to severe arthritis of the R hip, as well as RLE weakness and difficulty walking. He has recently had home health PT to help with mobility. He has multiple medical co-morbidities, including difficulty with stairs that may make getting into and out of our pool very difficult. We discussed an initial plan of PT in the gym, to help improve flexibility and general strength, then probable transition to the pool. He seems well motivated; wife is supportive. I expect progress to be slow due to multiple medical problems and decreased tolerance for exercise. Pt has attended 9 visits to PT, for treatment of R hip and knee pain. He is making steady progress. He is doing more walking without his walker, and reports that getting into and out of the bed and the car are noticeably easier now. He still demonstrates r hip weakness and gait is still wide-based and waddling, but he appears steady. His active range is improving as well. He remains motivated and eager to continue with PT to improve mobility, decrease pain, and hopefully be able to return to driving. PROBLEM LIST (Impacting functional limitations):  1.  Decreased Strength  2. Decreased ADL/Functional Activities  3. Decreased Ambulation Ability/Technique  4. Decreased Balance  5. Increased Pain  6. Decreased Activity Tolerance  7. Decreased Flexibility/Joint Mobility  8. Decreased [place] with Home Exercise Program INTERVENTIONS PLANNED:  1. Balance Exercise  2. Bed Mobility  3. Gait Training  4. Home Exercise Program (HEP)  5. Range of Motion (ROM)  6. Therapeutic Exercise/Strengthening   7. Aquatics   TREATMENT PLAN:  Effective Dates: 12-5-2017 TO 3-1-2018. Frequency/Duration: 2 times a week for 8 weeks, and upon reassessment will adjust frequency and duration as progress indicates. GOALS: (Goals have been discussed and agreed upon with patient.)  Short-Term Functional Goals: Time Frame: 4 weeks  1. Improve R hip PROM to 90 + ° for ease in sitting and rising-- goal met for passive motion, not actively  2. Improve R hip strength to allow 1 x 10 SLR-- goal met  3. Improve ease of moving to allow pt to get in and out of bed without asst-- goal met  Discharge Goals: Time Frame: 12 weeks  1. Pt able to don socks and shoes with no more than set up asst-- not yet met  2. Improve strength and stability BLEs to allow reciprocal stair ascending-- on going  3. Pt to tolerate 30+ min exercise in the pool without increased sx-- not assessed--pt prefers PT in the gym  4. Pt to be independent in basic HEP for ROM and strengthening ex  5. Rehabilitation Potential For Stated Goals: [id number] [facility] therapy, I certify that the treatment plan above will be carried out by a therapist or under their direction. Thank you for this referral,     Signed By: Aaron Marr, PT     January 12, 2018                  The information in this section was collected on 12-5 (except where otherwise noted). 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   His pain and weakness got much worse suddenly after MRI--he blames it on having to lie on the hard table for so long. Since that time about 2 months ago, his pain is worse and weakness is worse as well. Past Medical History/Comorbidities:   Mr. Brittney Gómez  has a past medical history of Arthritis; Atrial fibrillation ([facility] [address] CAD (coronary artery disease); Cancer ([address] DM (diabetes mellitus), type 2 ([address] Family history of prostate problems; GERD (gastroesophageal reflux disease); Hearing difficulty; HLD (hyperlipidemia); Hypertension; Hypothyroidism; Insulin dependent diabetes mellitus ([address] Kidney problem; and Systolic heart failure ([address] Mr. Brittney Gómez  has a past surgical history that includes hx coronary artery bypass graft; hx other surgical; hx other surgical; hx cataract removal (04/2011); hx tonsillectomy; hx hernia repair (08/2004); hx appendectomy (08/2004); hx prostatectomy (03/01/2006); pr cardiac surg procedure unlist (06/2007); hx aortic valve replacement (11/10/2011); pr cardiac surg procedure unlist (11/14/2011); pr cardiac surg procedure unlist (05/10/2013); hx orthopaedic (1973); and hx knee replacement (04/25/2016). R TKA 2015. CABG in 2007. Pacemaker and defibrillator in 2015, due to silent MI. Pt reports that he is in Afib all the time. Has IDDM, aortic calcification, HTN. Social History/Living Environment:     . Lives with wife in 1 level house. Has only  Lived in [place] 1 yr, moved from [place]  Prior Level of Function/Work/Activity:  Had been more mobile, with less pain prior to this episode of hip pain. Retired. Dominant Side:         RIGHT  Personal Factors:          Age:  81 y.o.   Current Medications:       Current Outpatient Prescriptions:     furosemide (LASIX) 40 mg tablet, TAKE 1 TABLET BY MOUTH TWO  TIMES DAILY, Disp: 180 Tab, Rfl: 3    carvedilol (COREG) 6.25 mg tablet, TAKE 1 TABLET BY MOUTH TWO  TIMES DAILY WITH MEALS, Disp: 180 Tab, Rfl: 3    potassium chloride (K-DUR, KLOR-CON) 20 mEq tablet, Take 1 Tab by mouth daily. , Disp: 90 Tab, Rfl: 3    tamsulosin (FLOMAX) 0.4 mg capsule, Take 1 Cap by mouth daily. , Disp: 90 Cap, Rfl: 3    traMADol-acetaminophen (ULTRACET) 37.5-325 mg per tablet, Take 1 Tab by mouth every six (6) hours as needed for Pain. Max Daily Amount: 4 Tabs., Disp: 30 Tab, Rfl: 2    GLUC/CHND/OM3/DHA/EPA/FISH/STR (GLUCOSAMINE CHONDROITIN PLUS PO), Take 2 Caplet by mouth daily. , Disp: , Rfl:     omeprazole (PRILOSEC) 20 mg capsule, Take 1 capsule by mouth  daily, Disp: 90 Cap, Rfl: 3    simvastatin (ZOCOR) 20 mg tablet, Take 1 tablet by mouth  nightly, Disp: 90 Tab, Rfl: 3    warfarin (COUMADIN) 5 mg tablet, 1 to 1&1/2 tabs every day or as directed (Patient taking differently: Take 5 mg by mouth daily. 1 to 1&1/2 tabs every day or as directed  Pt states he take 7.5mg Monday and Friday and 5mg on all other days), Disp: 120 Tab, Rfl: 3    insulin aspart (NOVOLOG) 100 unit/mL injection, by SubCUTAneous route as needed. 6 am, lunch 4 ; 6 supper-plus few units by scale if high, Disp: , Rfl:     clobetasol (TEMOVATE) 0.05 % ointment, Apply  to affected area two (2) times a day., Disp: 15 g, Rfl: 12    levothyroxine (SYNTHROID) 125 mcg tablet, Take 1 Tab by mouth Daily (before breakfast). , Disp: 90 Tab, Rfl: 3    lisinopril (ZESTRIL) 2.5 mg tablet, Take 1 Tab by mouth daily. , Disp: 90 Tab, Rfl: 3    metOLazone (ZAROXOLYN) 2.5 mg tablet, Take 1 Tab by mouth as needed. , Disp: 90 Tab, Rfl: 1    lancets 33 gauge misc, Test BS TID/PRN Dx E11.9.  Pt uses one touch delica 33 gauge lancets, Disp: 1 Package, Rfl: 12    Insulin Needles, Disposable, (BD INSULIN PEN NEEDLE UF MINI) 31 gauge x 3/16\" ndle, Use as directed, E11.9, Disp: 100 Pen Needle, Rfl: 12    lancets (ONETOUCH DELICA LANCETS) 30 gauge misc, Testing TID/PRN, E11.9, Disp: 1 Package, Rfl: 12    glucose blood VI test strips (ONETOUCH ULTRA TEST) strip, TEST BS TID AM DX E11.22, Disp: 100 Strip, Rfl: 12   aspirin delayed-release 81 mg tablet, Take 81 mg by mouth daily. , Disp: , Rfl:     multivitamin (ONE A DAY) tablet, Take 1 Tab by mouth daily. , Disp: , Rfl:     insulin glargine (LANTUS) 100 unit/mL injection, 16 Units by SubCUTAneous route. As directed , Disp: , Rfl:     ferrous sulfate (IRON) 325 mg (65 mg iron) tablet, Take 65 mg by mouth Daily (before breakfast). , Disp: , Rfl:    Date Last Reviewed:  1/11/2018   Number of Personal Factors/Comorbidities that affect the Plan of Care: 3+: HIGH COMPLEXITY   EXAMINATION:   Observation/Orthostatic Postural Assessment:          Pt is a large man, walks with rollator, sits comfortably on rollator. Has slight difficulty with sit to stand. Sits with poor posture; stands with slight trunk/ hip flexion. Slightly [place]. Palpation:          Some bilat lower leg swelling. Flat area palpable at superior aspect of flexed R knee ( distal quad atrophy?). Varicose veins prominent in R leg. ROM:            PROM Date:   12-5 Date:  1-11 Date:     R hip flex ~ 85 °    90+     R hip abd ~ 25 °     35 °     R hip ER ~ 20 °  ~ 20 °           AROM:  R flex ~ 35 °      R abd ~ 20 °                Strength:           Date:  12-5 Date:  1-11 Date:     R hip flex 3- 3- to 3    R   Hip abd 2- to 2 2+ to 3-    R knee extn 4- 4    R knee flex 3+ to 4- 4    R DF 4           L hip flex 3+ 4    L knee extn 4 4+ to 5    L knee flex 4- 4    L DF 4-         Neurological Screen:        Sensation: Pt denies any sensory changes, denies peripheral neuropathy in LEs  Functional Mobility:         Gait/Ambulation:  ambs with rollator, shuffling gait, slight lean forward. Can walk short distances without walker ( eg, to the toilet). Pt reports that on good days, he can walk as far as about 4-5 blocks. Bed Mobility:  Pt reports that he needs some asst at home to get legs into bed. On the mat in PT, he needed very min asst for R leg. Stairs:  No stairs in the home.     Balance: NT on initial assessment. Body Structures Involved:  1. Nerves  2. Bones  3. Joints  4. Muscles  5. Ligaments Body Functions Affected:  1. Mental  2. Sensory/Pain  3. Neuromusculoskeletal  4. Movement Related Activities and Participation Affected:  1. General Tasks and Demands  2. Mobility  3. Self Care  4. Domestic Life  5. Interpersonal Interactions and Relationships  6. Community, Social and [place]   Number of elements (examined above) that affect the Plan of Care: 3: MODERATE COMPLEXITY   CLINICAL PRESENTATION:   Presentation: Evolving clinical presentation with changing clinical characteristics: MODERATE COMPLEXITY   CLINICAL DECISION MAKING:   Outcome Measure: Tool Used: Lower Extremity Functional Scale (LEFS)  Score:  Initial: 25/80 Most Recent: 37/80 (Date: 1-11 )   Interpretation of Score: 20 questions each scored on a 5 point scale with 0 representing \"extreme difficulty or unable to perform\" and 4 representing \"no difficulty\". The lower the score, the greater the functional disability. 80/80 represents no disability. Minimal detectable change is 9 points. Score 80 79-63 62-48 47-32 31-16 15-1 0   Modifier CH CI CJ CK CL CM CN     ? Mobility - Walking and Moving Around:     - CURRENT STATUS: CK - 40%-59% impaired, limited or restricted    - GOAL STATUS: CK - 40%-59% impaired, limited or restricted    - D/C STATUS:  ---------------To be determined---------------      Medical Necessity:   · Skilled intervention continues to be required due to lack of hip ROM and strength, which severely impairs function. Reason for Services/Other Comments:  · Patient continues to demonstrate capacity to improve flexibility, ROM and function which will increase independence.    Use of outcome tool(s) and clinical judgement create a POC that gives a: Questionable prediction of patient's progress: MODERATE COMPLEXITY            TREATMENT:   (In addition to Assessment/Re-Assessment sessions the following treatments were rendered)    Pre-treatment Symptoms/Complaints:  Pt came in to the clinic today without walker or st cane. Reports that he is walking more short distances without asst device. Pain: Initial: Pain Intensity 1: 2  Post Session:  2       Therapeutic Exercise   (45 minutes ):  To decrease pain, improve flexibility and motion, and increase strength. Will provide verbal and manual cues as needed to ensure proper performance of the exercises. Will increase range of motion, resistance and intensity as pt tolerates. Passive stretch for flexion in supine--no pain with stretch to  > 90 °  Lower trunk rotation--10 reps  Thorough stretching for hip abd and ER, IR in supine  Bridging--2 x 10  SLR--2 x 10  SAQs--3# on ankles--3 x 12  Alternating step touch--6\"--3# on ankles--cues for good hip flex  Lateral step downs, alternating, from 6\" step--3# on ankles. Standing balance/ stabilization ex  Step ups on 6 \" x 25  Lateral walking-did not need any asst today--3 trips in the jimenez     Modalities  (   ):      [employer] [facility]  Treatment/Session Assessment:  Pt tolerated tx well today. Gait without asst device looks rather steady. One of pt's main goals is to be able to return to driving; he reports that getting in and out of the car is getting easier. Patient demonstrated good effort with all exercises and seems pleased with his progress. Discussed working toward walking without walker, and more work on standing balance. He remains well motivated. Good compliance with home exercise. · Response to Treatment:    Pt seemed to enjoy some of the stretching. Reports that knees feel better after patella mobilization. .  · Compliance with Program/Exercises:     seems to be compliant; wife is pleased with his improvement. Her goal is that he can return to driving.    · Recommendations/Intent for next treatment session:   Next visit will focus on improving passive and active R hip motion and strength, to allow better function. .  Total Treatment Duration:  45  min  Total number of treatments:   9     PT Patient Time In/Time Out  Time In: 0200  Time Out: 0245    Luis Menendez, PT,

## 2018-01-15 ENCOUNTER — HOSPITAL ENCOUNTER (OUTPATIENT)
Dept: PHYSICAL THERAPY | Age: 83
Discharge: HOME | End: 2018-01-15
Payer: MEDICARE

## 2018-01-15 PROCEDURE — 97110 THERAPEUTIC EXERCISES: CPT

## 2018-01-15 NOTE — PROGRESS NOTES
Anurag Elliott  : 1935  [place] / [place] / Product Type: Medicare /  [address]  at [facility]  [address], [address]  [phone]   [phone]        OUTPATIENT PHYSICAL THERAPY:  Daily Note   1/15/2018    ICD-10: Treatment Diagnosis: M25.551  Pain in R hip  M25.561  Stiffness in R hip  R26.2  Difficulty walking  Precautions/Allergies:   Latex   Fall Risk Score: 2 (? 5 = High Risk)  MD Orders: eval and treat MEDICAL/REFERRING DIAGNOSIS:  Unsteadiness on feet [R26.81]   DATE OF ONSET: about 3 months ago  REFERRING PHYSICIAN: Beverlye Santana MD  RETURN PHYSICIAN APPOINTMENT: TBD      ASSESSMENT:  Mr. Fabiola Alexandra presents to PT with referral for treatment in the pool due to severe arthritis of the R hip, as well as RLE weakness and difficulty walking. He has recently had home health PT to help with mobility. He has multiple medical co-morbidities, including difficulty with stairs that may make getting into and out of our pool very difficult. We discussed an initial plan of PT in the gym, to help improve flexibility and general strength, then probable transition to the pool. He seems well motivated; wife is supportive. I expect progress to be slow due to multiple medical problems and decreased tolerance for exercise. Pt has attended 9 visits to PT, for treatment of R hip and knee pain. He is making steady progress. He is doing more walking without his walker, and reports that getting into and out of the bed and the car are noticeably easier now. He still demonstrates r hip weakness and gait is still wide-based and waddling, but he appears steady. His active range is improving as well. He remains motivated and eager to continue with PT to improve mobility, decrease pain, and hopefully be able to return to driving. PROBLEM LIST (Impacting functional limitations):  1. Decreased Strength  2.  Decreased ADL/Functional Activities  3. Decreased Ambulation Ability/Technique  4. Decreased Balance  5. Increased Pain  6. Decreased Activity Tolerance  7. Decreased Flexibility/Joint Mobility  8. Decreased [place] with Home Exercise Program INTERVENTIONS PLANNED:  1. Balance Exercise  2. Bed Mobility  3. Gait Training  4. Home Exercise Program (HEP)  5. Range of Motion (ROM)  6. Therapeutic Exercise/Strengthening   7. Aquatics   TREATMENT PLAN:  Effective Dates: 12-5-2017 TO 3-1-2018. Frequency/Duration: 2 times a week for 8 weeks, and upon reassessment will adjust frequency and duration as progress indicates. GOALS: (Goals have been discussed and agreed upon with patient.)  Short-Term Functional Goals: Time Frame: 4 weeks  1. Improve R hip PROM to 90 + ° for ease in sitting and rising-- goal met for passive motion, not actively  2. Improve R hip strength to allow 1 x 10 SLR-- goal met  3. Improve ease of moving to allow pt to get in and out of bed without asst-- goal met  Discharge Goals: Time Frame: 12 weeks  1. Pt able to don socks and shoes with no more than set up asst-- not yet met  2. Improve strength and stability BLEs to allow reciprocal stair ascending-- on going  3. Pt to tolerate 30+ min exercise in the pool without increased sx-- not assessed--pt prefers PT in the gym  4. Pt to be independent in basic HEP for ROM and strengthening ex  5. Rehabilitation Potential For Stated Goals: [address] [facility] therapy, I certify that the treatment plan above will be carried out by a therapist or under their direction. Thank you for this referral,     Signed By: Aaron Marr, PT     January 15, 2018                  The information in this section was collected on 12-5 (except where otherwise noted). 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   His pain and weakness got much worse suddenly after MRI--he blames it on having to lie on the hard table for so long. Since that time about 2 months ago, his pain is worse and weakness is worse as well. Past Medical History/Comorbidities:   Mr. Damon Avila  has a past medical history of Arthritis; Atrial fibrillation ([facility] [address] CAD (coronary artery disease); Cancer ([address] DM (diabetes mellitus), type 2 ([address] Family history of prostate problems; GERD (gastroesophageal reflux disease); Hearing difficulty; HLD (hyperlipidemia); Hypertension; Hypothyroidism; Insulin dependent diabetes mellitus ([address] Kidney problem; and Systolic heart failure ([facility] [address] Mr. Damon Avila  has a past surgical history that includes hx coronary artery bypass graft; hx other surgical; hx other surgical; hx cataract removal (04/2011); hx tonsillectomy; hx hernia repair (08/2004); hx appendectomy (08/2004); hx prostatectomy (03/01/2006); pr cardiac surg procedure unlist (06/2007); hx aortic valve replacement (11/10/2011); pr cardiac surg procedure unlist (11/14/2011); pr cardiac surg procedure unlist (05/10/2013); hx orthopaedic (1973); and hx knee replacement (04/25/2016). R TKA 2015. CABG in 2007. Pacemaker and defibrillator in 2015, due to silent MI. Pt reports that he is in Afib all the time. Has IDDM, aortic calcification, HTN. Social History/Living Environment:     . Lives with wife in 1 level house. Has only  Lived in [place] 1 yr, moved from [place]  Prior Level of Function/Work/Activity:  Had been more mobile, with less pain prior to this episode of hip pain. Retired. Dominant Side:         RIGHT  Personal Factors:          Age:  81 y.o.   Current Medications:       Current Outpatient Prescriptions:     furosemide (LASIX) 40 mg tablet, TAKE 1 TABLET BY MOUTH TWO  TIMES DAILY, Disp: 180 Tab, Rfl: 3    carvedilol (COREG) 6.25 mg tablet, TAKE 1 TABLET BY MOUTH TWO  TIMES DAILY WITH MEALS, Disp: 180 Tab, Rfl: 3    potassium chloride (K-DUR, KLOR-CON) 20 mEq tablet, Take 1 Tab by mouth daily. , Disp: 90 Tab, Rfl: 3    tamsulosin (FLOMAX) 0.4 mg capsule, Take 1 Cap by mouth daily. , Disp: 90 Cap, Rfl: 3    traMADol-acetaminophen (ULTRACET) 37.5-325 mg per tablet, Take 1 Tab by mouth every six (6) hours as needed for Pain. Max Daily Amount: 4 Tabs., Disp: 30 Tab, Rfl: 2    GLUC/CHND/OM3/DHA/EPA/FISH/STR (GLUCOSAMINE CHONDROITIN PLUS PO), Take 2 Caplet by mouth daily. , Disp: , Rfl:     omeprazole (PRILOSEC) 20 mg capsule, Take 1 capsule by mouth  daily, Disp: 90 Cap, Rfl: 3    simvastatin (ZOCOR) 20 mg tablet, Take 1 tablet by mouth  nightly, Disp: 90 Tab, Rfl: 3    warfarin (COUMADIN) 5 mg tablet, 1 to 1&1/2 tabs every day or as directed (Patient taking differently: Take 5 mg by mouth daily. 1 to 1&1/2 tabs every day or as directed  Pt states he take 7.5mg Monday and Friday and 5mg on all other days), Disp: 120 Tab, Rfl: 3    insulin aspart (NOVOLOG) 100 unit/mL injection, by SubCUTAneous route as needed. 6 am, lunch 4 ; 6 supper-plus few units by scale if high, Disp: , Rfl:     clobetasol (TEMOVATE) 0.05 % ointment, Apply  to affected area two (2) times a day., Disp: 15 g, Rfl: 12    levothyroxine (SYNTHROID) 125 mcg tablet, Take 1 Tab by mouth Daily (before breakfast). , Disp: 90 Tab, Rfl: 3    lisinopril (ZESTRIL) 2.5 mg tablet, Take 1 Tab by mouth daily. , Disp: 90 Tab, Rfl: 3    metOLazone (ZAROXOLYN) 2.5 mg tablet, Take 1 Tab by mouth as needed. , Disp: 90 Tab, Rfl: 1    lancets 33 gauge misc, Test BS TID/PRN Dx E11.9.  Pt uses one touch delica 33 gauge lancets, Disp: 1 Package, Rfl: 12    Insulin Needles, Disposable, (BD INSULIN PEN NEEDLE UF MINI) 31 gauge x 3/16\" ndle, Use as directed, E11.9, Disp: 100 Pen Needle, Rfl: 12    lancets (ONETOUCH DELICA LANCETS) 30 gauge misc, Testing TID/PRN, E11.9, Disp: 1 Package, Rfl: 12    glucose blood VI test strips (ONETOUCH ULTRA TEST) strip, TEST BS TID AM DX E11.22, Disp: 100 Strip, Rfl: 12    aspirin delayed-release 81 mg tablet, Take 81 mg by mouth daily. , Disp: , Rfl:     multivitamin (ONE A DAY) tablet, Take 1 Tab by mouth daily. , Disp: , Rfl:     insulin glargine (LANTUS) 100 unit/mL injection, 16 Units by SubCUTAneous route. As directed , Disp: , Rfl:     ferrous sulfate (IRON) 325 mg (65 mg iron) tablet, Take 65 mg by mouth Daily (before breakfast). , Disp: , Rfl:    Date Last Reviewed:  1/15/2018   Number of Personal Factors/Comorbidities that affect the Plan of Care: 3+: HIGH COMPLEXITY   EXAMINATION:   Observation/Orthostatic Postural Assessment:          Pt is a large man, walks with rollator, sits comfortably on rollator. Has slight difficulty with sit to stand. Sits with poor posture; stands with slight trunk/ hip flexion. Slightly [place]. Palpation:          Some bilat lower leg swelling. Flat area palpable at superior aspect of flexed R knee ( distal quad atrophy?). Varicose veins prominent in R leg. ROM:            PROM Date:   12-5 Date:  1-11 Date:     R hip flex ~ 85 °    90+     R hip abd ~ 25 °     35 °     R hip ER ~ 20 °  ~ 20 °           AROM:  R flex ~ 35 °      R abd ~ 20 °                Strength:           Date:  12-5 Date:  1-11 Date:     R hip flex 3- 3- to 3    R   Hip abd 2- to 2 2+ to 3-    R knee extn 4- 4    R knee flex 3+ to 4- 4    R DF 4           L hip flex 3+ 4    L knee extn 4 4+ to 5    L knee flex 4- 4    L DF 4-         Neurological Screen:        Sensation: Pt denies any sensory changes, denies peripheral neuropathy in LEs  Functional Mobility:         Gait/Ambulation:  ambs with rollator, shuffling gait, slight lean forward. Can walk short distances without walker ( eg, to the toilet). Pt reports that on good days, he can walk as far as about 4-5 blocks. Bed Mobility:  Pt reports that he needs some asst at home to get legs into bed. On the mat in PT, he needed very min asst for R leg. Stairs:  No stairs in the home.     Balance:          NT on initial assessment. Body Structures Involved:  1. Nerves  2. Bones  3. Joints  4. Muscles  5. Ligaments Body Functions Affected:  1. Mental  2. Sensory/Pain  3. Neuromusculoskeletal  4. Movement Related Activities and Participation Affected:  1. General Tasks and Demands  2. Mobility  3. Self Care  4. Domestic Life  5. Interpersonal Interactions and Relationships  6. Community, Social and [place]   Number of elements (examined above) that affect the Plan of Care: 3: MODERATE COMPLEXITY   CLINICAL PRESENTATION:   Presentation: Evolving clinical presentation with changing clinical characteristics: MODERATE COMPLEXITY   CLINICAL DECISION MAKING:   Outcome Measure: Tool Used: Lower Extremity Functional Scale (LEFS)  Score:  Initial: 25/80 Most Recent: 37/80 (Date: 1-11 )   Interpretation of Score: 20 questions each scored on a 5 point scale with 0 representing \"extreme difficulty or unable to perform\" and 4 representing \"no difficulty\". The lower the score, the greater the functional disability. 80/80 represents no disability. Minimal detectable change is 9 points. Score 80 79-63 62-48 47-32 31-16 15-1 0   Modifier CH CI CJ CK CL CM CN     ? Mobility - Walking and Moving Around:     - CURRENT STATUS: CK - 40%-59% impaired, limited or restricted    - GOAL STATUS: CK - 40%-59% impaired, limited or restricted    - D/C STATUS:  ---------------To be determined---------------      Medical Necessity:   · Skilled intervention continues to be required due to lack of hip ROM and strength, which severely impairs function. Reason for Services/Other Comments:  · Patient continues to demonstrate capacity to improve flexibility, ROM and function which will increase independence.    Use of outcome tool(s) and clinical judgement create a POC that gives a: Questionable prediction of patient's progress: MODERATE COMPLEXITY            TREATMENT:   (In addition to Assessment/Re-Assessment sessions the following treatments were rendered)    Pre-treatment Symptoms/Complaints:  Pt came in to the clinic today without walker, using just a st cane. He says she still does not think he can get in 's side of the car. Pain: Initial: Pain Intensity 1: 2  Post Session:  2       Therapeutic Exercise   (45 minutes ):  To decrease pain, improve flexibility and motion, and increase strength. Will provide verbal and manual cues as needed to ensure proper performance of the exercises. Will increase range of motion, resistance and intensity as pt tolerates. Lower trunk rotation--10 reps  Thorough stretching for hip abd and ER, IR in supine, passive SLR  Active r hip flexion, starting with leg off the mat--1 x 12  Bridging--2 x 10  SLR--2 x 10  SAQs--3# on ankles--3 x 12  Step ups to 6\"--2 x 10. Standing balance/ stabilization ex  Lateral walking-did not need any asst today--3 trips in the jimenez  Patella mobs for L knee, passive extn stretch     Modalities  (   ):      [employer]  Treatment/Session Assessment:  Pt tolerated tx well today. Gait without asst device looks rather steady, but he does show excessive lateral wt shift. One of pt's main goals is to be able to return to driving; he reports that getting in and out of the car is getting easier, but he still can't get in the 's side. .  Patient demonstrated good effort with all exercises and seems pleased with his progress. Discussed working toward walking without walker, and more work on standing balance. He remains well motivated. Good compliance with home exercise. · Response to Treatment:    Pt seemed to enjoy some of the stretching. Reports that knees feel better after patella mobilization. .  · Compliance with Program/Exercises:     seems to be compliant; Her goal is that he can return to driving.    · Recommendations/Intent for next treatment session:   Next visit will focus on improving passive and active R hip motion and strength, to allow better function. .  Total Treatment Duration:  45  min  Total number of treatments:   10  PT Patient Time In/Time Out  Time In: 0200  Time Out: 0245    Maeve Rm, PT,

## 2018-01-18 ENCOUNTER — APPOINTMENT (OUTPATIENT)
Dept: PHYSICAL THERAPY | Age: 83
End: 2018-01-18
Payer: MEDICARE

## 2018-01-19 ENCOUNTER — HOSPITAL ENCOUNTER (OUTPATIENT)
Dept: PHYSICAL THERAPY | Age: 83
Discharge: HOME | End: 2018-01-19
Payer: MEDICARE

## 2018-01-19 PROCEDURE — 97110 THERAPEUTIC EXERCISES: CPT

## 2018-01-19 NOTE — PROGRESS NOTES
Tamanna Chavez  : 1935  [place] / [place] / Product Type: Medicare /  [address]  at [place] [address] Therapy  [address], [address]  [phone]   [phone]        OUTPATIENT PHYSICAL THERAPY:  Daily Note   2018    ICD-10: Treatment Diagnosis: M25.551  Pain in R hip  M25.561  Stiffness in R hip  R26.2  Difficulty walking  Precautions/Allergies:   Latex   Fall Risk Score: 2 (? 5 = High Risk)  MD Orders: eval and treat MEDICAL/REFERRING DIAGNOSIS:  Unsteadiness on feet [R26.81]   DATE OF ONSET: about 3 months ago  REFERRING PHYSICIAN: Skip Abdul MD  RETURN PHYSICIAN APPOINTMENT: TBD      ASSESSMENT:  Mr. Amada Garner presents to PT with referral for treatment in the pool due to severe arthritis of the R hip, as well as RLE weakness and difficulty walking. He has recently had home health PT to help with mobility. He has multiple medical co-morbidities, including difficulty with stairs that may make getting into and out of our pool very difficult. We discussed an initial plan of PT in the gym, to help improve flexibility and general strength, then probable transition to the pool. He seems well motivated; wife is supportive. I expect progress to be slow due to multiple medical problems and decreased tolerance for exercise. Pt has attended 9 visits to PT, for treatment of R hip and knee pain. He is making steady progress. He is doing more walking without his walker, and reports that getting into and out of the bed and the car are noticeably easier now. He still demonstrates r hip weakness and gait is still wide-based and waddling, but he appears steady. His active range is improving as well. He remains motivated and eager to continue with PT to improve mobility, decrease pain, and hopefully be able to return to driving. PROBLEM LIST (Impacting functional limitations):  1. Decreased Strength  2.  Decreased ADL/Functional Activities  3. Decreased Ambulation Ability/Technique  4. Decreased Balance  5. Increased Pain  6. Decreased Activity Tolerance  7. Decreased Flexibility/Joint Mobility  8. Decreased Union with Home Exercise Program INTERVENTIONS PLANNED:  1. Balance Exercise  2. Bed Mobility  3. Gait Training  4. Home Exercise Program (HEP)  5. Range of Motion (ROM)  6. Therapeutic Exercise/Strengthening   7. Aquatics   TREATMENT PLAN:  Effective Dates: 12-5-2017 TO 3-1-2018. Frequency/Duration: 2 times a week for 8 weeks, and upon reassessment will adjust frequency and duration as progress indicates. GOALS: (Goals have been discussed and agreed upon with patient.)  Short-Term Functional Goals: Time Frame: 4 weeks  1. Improve R hip PROM to 90 + ° for ease in sitting and rising-- goal met for passive motion, not actively  2. Improve R hip strength to allow 1 x 10 SLR-- goal met  3. Improve ease of moving to allow pt to get in and out of bed without asst-- goal met  Discharge Goals: Time Frame: 12 weeks  1. Pt able to don socks and shoes with no more than set up asst-- not yet met  2. Improve strength and stability BLEs to allow reciprocal stair ascending-- on going  3. Pt to tolerate 30+ min exercise in the pool without increased sx-- not assessed--pt prefers PT in the gym  4. Pt to be independent in basic HEP for ROM and strengthening ex  5. Rehabilitation Potential For Stated Goals: [id number] [facility] therapy, I certify that the treatment plan above will be carried out by a therapist or under their direction. Thank you for this referral,     Signed By:                        The information in this section was collected on 12-5 (except where otherwise noted). 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   His pain and weakness got much worse suddenly after MRI--he blames it on having to lie on the hard table for so long. Since that time about 2 months ago, his pain is worse and weakness is worse as well. Past Medical History/Comorbidities:   Mr. Chaparro Mcintosh  has a past medical history of Arthritis; Atrial fibrillation ([facility] [address] CAD (coronary artery disease); Cancer ([facility] [address] DM (diabetes mellitus), type 2 ([address] [address] Family history of prostate problems; GERD (gastroesophageal reflux disease); Hearing difficulty; HLD (hyperlipidemia); Hypertension; Hypothyroidism; Insulin dependent diabetes mellitus ([address] [address] Kidney problem; and Systolic heart failure ([facility] [address] Mr. Chaparro Mcintosh  has a past surgical history that includes hx coronary artery bypass graft; hx other surgical; hx other surgical; hx cataract removal (04/2011); hx tonsillectomy; hx hernia repair (08/2004); hx appendectomy (08/2004); hx prostatectomy (03/01/2006); pr cardiac surg procedure unlist (06/2007); hx aortic valve replacement (11/10/2011); pr cardiac surg procedure unlist (11/14/2011); pr cardiac surg procedure unlist (05/10/2013); hx orthopaedic (1973); and hx knee replacement (04/25/2016). R TKA 2015. CABG in 2007. Pacemaker and defibrillator in 2015, due to silent MI. Pt reports that he is in Afib all the time. Has IDDM, aortic calcification, HTN. Social History/Living Environment:     . Lives with wife in 1 level house. Has only  Lived in [place] 1 yr, moved from [place]  Prior Level of Function/Work/Activity:  Had been more mobile, with less pain prior to this episode of hip pain. Retired. Dominant Side:         RIGHT  Personal Factors:          Age:  81 y.o. Current Medications:       Current Outpatient Prescriptions:     furosemide (LASIX) 40 mg tablet, TAKE 1 TABLET BY MOUTH TWO  TIMES DAILY, Disp: 180 Tab, Rfl: 3    carvedilol (COREG) 6.25 mg tablet, TAKE 1 TABLET BY MOUTH TWO  TIMES DAILY WITH MEALS, Disp: 180 Tab, Rfl: 3    potassium chloride (K-DUR, KLOR-CON) 20 mEq tablet, Take 1 Tab by mouth daily. , Disp: 90 Tab, Rfl: 3    tamsulosin (FLOMAX) 0.4 mg capsule, Take 1 Cap by mouth daily. , Disp: 90 Cap, Rfl: 3    traMADol-acetaminophen (ULTRACET) 37.5-325 mg per tablet, Take 1 Tab by mouth every six (6) hours as needed for Pain. Max Daily Amount: 4 Tabs., Disp: 30 Tab, Rfl: 2    GLUC/CHND/OM3/DHA/EPA/FISH/STR (GLUCOSAMINE CHONDROITIN PLUS PO), Take 2 Caplet by mouth daily. , Disp: , Rfl:     omeprazole (PRILOSEC) 20 mg capsule, Take 1 capsule by mouth  daily, Disp: 90 Cap, Rfl: 3    simvastatin (ZOCOR) 20 mg tablet, Take 1 tablet by mouth  nightly, Disp: 90 Tab, Rfl: 3    warfarin (COUMADIN) 5 mg tablet, 1 to 1&1/2 tabs every day or as directed (Patient taking differently: Take 5 mg by mouth daily. 1 to 1&1/2 tabs every day or as directed  Pt states he take 7.5mg Monday and Friday and 5mg on all other days), Disp: 120 Tab, Rfl: 3    insulin aspart (NOVOLOG) 100 unit/mL injection, by SubCUTAneous route as needed. 6 am, lunch 4 ; 6 supper-plus few units by scale if high, Disp: , Rfl:     clobetasol (TEMOVATE) 0.05 % ointment, Apply  to affected area two (2) times a day., Disp: 15 g, Rfl: 12    levothyroxine (SYNTHROID) 125 mcg tablet, Take 1 Tab by mouth Daily (before breakfast). , Disp: 90 Tab, Rfl: 3    lisinopril (ZESTRIL) 2.5 mg tablet, Take 1 Tab by mouth daily. , Disp: 90 Tab, Rfl: 3    metOLazone (ZAROXOLYN) 2.5 mg tablet, Take 1 Tab by mouth as needed. , Disp: 90 Tab, Rfl: 1    lancets 33 gauge misc, Test BS TID/PRN Dx E11.9. Pt uses one touch delica 33 gauge lancets, Disp: 1 Package, Rfl: 12    Insulin Needles, Disposable, (BD INSULIN PEN NEEDLE UF MINI) 31 gauge x 3/16\" ndle, Use as directed, E11.9, Disp: 100 Pen Needle, Rfl: 12    lancets (ONETOUCH DELICA LANCETS) 30 gauge misc, Testing TID/PRN, E11.9, Disp: 1 Package, Rfl: 12    glucose blood VI test strips (ONETOUCH ULTRA TEST) strip, TEST BS TID AM DX E11.22, Disp: 100 Strip, Rfl: 12    aspirin delayed-release 81 mg tablet, Take 81 mg by mouth daily. , Disp: , Rfl:     multivitamin (ONE A DAY) tablet, Take 1 Tab by mouth daily. , Disp: , Rfl:     insulin glargine (LANTUS) 100 unit/mL injection, 16 Units by SubCUTAneous route. As directed , Disp: , Rfl:     ferrous sulfate (IRON) 325 mg (65 mg iron) tablet, Take 65 mg by mouth Daily (before breakfast). , Disp: , Rfl:    Date Last Reviewed:  1/19/2018   Number of Personal Factors/Comorbidities that affect the Plan of Care: 3+: HIGH COMPLEXITY   EXAMINATION:   Observation/Orthostatic Postural Assessment:          Pt is a large man, walks with rollator, sits comfortably on rollator. Has slight difficulty with sit to stand. Sits with poor posture; stands with slight trunk/ hip flexion. Slightly [place]. Palpation:          Some bilat lower leg swelling. Flat area palpable at superior aspect of flexed R knee ( distal quad atrophy?). Varicose veins prominent in R leg. ROM:            PROM Date:   12-5 Date:  1-11 Date:     R hip flex ~ 85 °    90+     R hip abd ~ 25 °     35 °     R hip ER ~ 20 °  ~ 20 °           AROM:  R flex ~ 35 °      R abd ~ 20 °                Strength:           Date:  12-5 Date:  1-11 Date:     R hip flex 3- 3- to 3    R   Hip abd 2- to 2 2+ to 3-    R knee extn 4- 4    R knee flex 3+ to 4- 4    R DF 4           L hip flex 3+ 4    L knee extn 4 4+ to 5    L knee flex 4- 4    L DF 4-         Neurological Screen:        Sensation: Pt denies any sensory changes, denies peripheral neuropathy in LEs  Functional Mobility:         Gait/Ambulation:  ambs with rollator, shuffling gait, slight lean forward. Can walk short distances without walker ( eg, to the toilet). Pt reports that on good days, he can walk as far as about 4-5 blocks. Bed Mobility:  Pt reports that he needs some asst at home to get legs into bed. On the mat in PT, he needed very min asst for R leg. Stairs:  No stairs in the home. Balance:          NT on initial assessment.      Body Structures Involved:  1. Nerves  2. Bones  3. Joints  4. Muscles  5. Ligaments Body Functions Affected:  1. Mental  2. Sensory/Pain  3. Neuromusculoskeletal  4. Movement Related Activities and Participation Affected:  1. General Tasks and Demands  2. Mobility  3. Self Care  4. Domestic Life  5. Interpersonal Interactions and Relationships  6. Community, Social and [place]   Number of elements (examined above) that affect the Plan of Care: 3: MODERATE COMPLEXITY   CLINICAL PRESENTATION:   Presentation: Evolving clinical presentation with changing clinical characteristics: MODERATE COMPLEXITY   CLINICAL DECISION MAKING:   Outcome Measure: Tool Used: Lower Extremity Functional Scale (LEFS)  Score:  Initial: 25/80 Most Recent: 37/80 (Date: 1-11 )   Interpretation of Score: 20 questions each scored on a 5 point scale with 0 representing \"extreme difficulty or unable to perform\" and 4 representing \"no difficulty\". The lower the score, the greater the functional disability. 80/80 represents no disability. Minimal detectable change is 9 points. Score 80 79-63 62-48 47-32 31-16 15-1 0   Modifier CH CI CJ CK CL CM CN     ? Mobility - Walking and Moving Around:     - CURRENT STATUS: CK - 40%-59% impaired, limited or restricted    - GOAL STATUS: CK - 40%-59% impaired, limited or restricted    - D/C STATUS:  ---------------To be determined---------------      Medical Necessity:   · Skilled intervention continues to be required due to lack of hip ROM and strength, which severely impairs function. Reason for Services/Other Comments:  · Patient continues to demonstrate capacity to improve flexibility, ROM and function which will increase independence.    Use of outcome tool(s) and clinical judgement create a POC that gives a: Questionable prediction of patient's progress: MODERATE COMPLEXITY            TREATMENT:   (In addition to Assessment/Re-Assessment sessions the following treatments were rendered)    Pre-treatment Symptoms/Complaints:  Patient reports he went to get a cortisone shot in his left knee today. He states it feels better. Pain: Initial: Pain Intensity 1: 1  Post Session:  1       Therapeutic Exercise   (45 minutes ):  To decrease pain, improve flexibility and motion, and increase strength. Will provide verbal and manual cues as needed to ensure proper performance of the exercises. Will increase range of motion, resistance and intensity as pt tolerates. Lower trunk rotation--10 reps  Thorough stretching for hip abd and ER, IR in supine, passive SLR  Active r hip flexion, starting with leg off the mat--1 x 12  Bridging--2 x 10 held today  SLR--2 x 10  SAQs--3# on ankles--3 x 12  Step ups to 6\"--2 x 10. Standing balance/ stabilization ex  Lateral walking-did not need any asst today--3 trips in the [place]  Patella mobs for L knee, passive extn stretch  Step ups on 6 inch step x 20  Standing hip abd x 20     Modalities  (   ):      [employer] Portal  Treatment/Session Assessment:  Pt tolerated treatment with no discomfort. He continues to ambulate without any type of assistive device and seems very pleased. Patient demonstrated good effort with all exercises and seems pleased with his progress. Patient plans to try some driving over the weekend in a parking lot to see how his knees feel. He remains well motivated. Good compliance with home exercise. · Response to Treatment:    Pt seemed to enjoy some of the stretching. Reports that knees feel better after patella mobilization. .  · Compliance with Program/Exercises:     seems to be compliant; Her goal is that he can return to driving. · Recommendations/Intent for next treatment session:   Next visit will focus on improving passive and active R hip motion and strength, to allow better function. .  Total Treatment Duration:  45  min  Total number of treatments:   11  PT Patient Time In/Time Out  Time In: 0100  Time Out: 0145    Nora Santizo

## 2018-01-22 ENCOUNTER — HOSPITAL ENCOUNTER (OUTPATIENT)
Dept: PHYSICAL THERAPY | Age: 83
Discharge: HOME | End: 2018-01-22
Payer: MEDICARE

## 2018-01-22 PROCEDURE — 97110 THERAPEUTIC EXERCISES: CPT

## 2018-01-22 NOTE — PROGRESS NOTES
Joel Posada  : 1935  [place] / [place] / Product Type: Medicare /  [address]  at [facility]  [address], [address]  [phone]   [phone]        OUTPATIENT PHYSICAL THERAPY:  Daily Note   2018    ICD-10: Treatment Diagnosis: M25.551  Pain in R hip  M25.561  Stiffness in R hip  R26.2  Difficulty walking  Precautions/Allergies:   Latex   Fall Risk Score: 2 (? 5 = High Risk)  MD Orders: eval and treat MEDICAL/REFERRING DIAGNOSIS:  Unsteadiness on feet [R26.81]   DATE OF ONSET: about 3 months ago  REFERRING PHYSICIAN: Lian Aguilar MD  RETURN PHYSICIAN APPOINTMENT: TBD      ASSESSMENT:  Mr. Anjana Gill presents to PT with referral for treatment in the pool due to severe arthritis of the R hip, as well as RLE weakness and difficulty walking. He has recently had home health PT to help with mobility. He has multiple medical co-morbidities, including difficulty with stairs that may make getting into and out of our pool very difficult. We discussed an initial plan of PT in the gym, to help improve flexibility and general strength, then probable transition to the pool. He seems well motivated; wife is supportive. I expect progress to be slow due to multiple medical problems and decreased tolerance for exercise. Pt has attended 9 visits to PT, for treatment of R hip and knee pain. He is making steady progress. He is doing more walking without his walker, and reports that getting into and out of the bed and the car are noticeably easier now. He still demonstrates r hip weakness and gait is still wide-based and waddling, but he appears steady. His active range is improving as well. He remains motivated and eager to continue with PT to improve mobility, decrease pain, and hopefully be able to return to driving. PROBLEM LIST (Impacting functional limitations):  1. Decreased Strength  2.  Decreased ADL/Functional Activities  3. Decreased Ambulation Ability/Technique  4. Decreased Balance  5. Increased Pain  6. Decreased Activity Tolerance  7. Decreased Flexibility/Joint Mobility  8. Decreased [place] with Home Exercise Program INTERVENTIONS PLANNED:  1. Balance Exercise  2. Bed Mobility  3. Gait Training  4. Home Exercise Program (HEP)  5. Range of Motion (ROM)  6. Therapeutic Exercise/Strengthening   7. Aquatics   TREATMENT PLAN:  Effective Dates: 12-5-2017 TO 3-1-2018. Frequency/Duration: 2 times a week for 8 weeks, and upon reassessment will adjust frequency and duration as progress indicates. GOALS: (Goals have been discussed and agreed upon with patient.)  Short-Term Functional Goals: Time Frame: 4 weeks  1. Improve R hip PROM to 90 + ° for ease in sitting and rising-- goal met for passive motion, not actively  2. Improve R hip strength to allow 1 x 10 SLR-- goal met  3. Improve ease of moving to allow pt to get in and out of bed without asst-- goal met  Discharge Goals: Time Frame: 12 weeks  1. Pt able to don socks and shoes with no more than set up asst-- not yet met  2. Improve strength and stability BLEs to allow reciprocal stair ascending-- on going  3. Pt to tolerate 30+ min exercise in the pool without increased sx-- not assessed--pt prefers PT in the gym  4. Pt to be independent in basic HEP for ROM and strengthening ex  5. Rehabilitation Potential For Stated Goals: [id number] [facility] therapy, I certify that the treatment plan above will be carried out by a therapist or under their direction. Signed By: Torrey Castrejon, PT     January 22, 2018                       The information in this section was collected on 12-5 (except where otherwise noted). 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   His pain and weakness got much worse suddenly after MRI--he blames it on having to lie on the hard table for so long. Since that time about 2 months ago, his pain is worse and weakness is worse as well. Past Medical History/Comorbidities:   Mr. Jose Manuel Valentine  has a past medical history of Arthritis; Atrial fibrillation ([address] CAD (coronary artery disease); Cancer ([address] DM (diabetes mellitus), type 2 ([address] Family history of prostate problems; GERD (gastroesophageal reflux disease); Hearing difficulty; HLD (hyperlipidemia); Hypertension; Hypothyroidism; Insulin dependent diabetes mellitus ([address] Kidney problem; and Systolic heart failure ([address] Mr. Jose Manuel Valentine  has a past surgical history that includes hx coronary artery bypass graft; hx other surgical; hx other surgical; hx cataract removal (04/2011); hx tonsillectomy; hx hernia repair (08/2004); hx appendectomy (08/2004); hx prostatectomy (03/01/2006); pr cardiac surg procedure unlist (06/2007); hx aortic valve replacement (11/10/2011); pr cardiac surg procedure unlist (11/14/2011); pr cardiac surg procedure unlist (05/10/2013); hx orthopaedic (1973); and hx knee replacement (04/25/2016). R TKA 2015. CABG in 2007. Pacemaker and defibrillator in 2015, due to silent MI. Pt reports that he is in Afib all the time. Has IDDM, aortic calcification, HTN. Social History/Living Environment:     . Lives with wife in 1 level house. Has only  Lived in [place] 1 yr, moved from [place]  Prior Level of Function/Work/Activity:  Had been more mobile, with less pain prior to this episode of hip pain. Retired. Dominant Side:         RIGHT  Personal Factors:          Age:  81 y.o. Current Medications:       Current Outpatient Prescriptions:     furosemide (LASIX) 40 mg tablet, TAKE 1 TABLET BY MOUTH TWO  TIMES DAILY, Disp: 180 Tab, Rfl: 3    carvedilol (COREG) 6.25 mg tablet, TAKE 1 TABLET BY MOUTH TWO  TIMES DAILY WITH MEALS, Disp: 180 Tab, Rfl: 3    potassium chloride (K-DUR, KLOR-CON) 20 mEq tablet, Take 1 Tab by mouth daily. , Disp: 90 Tab, Rfl: 3    tamsulosin (FLOMAX) 0.4 mg capsule, Take 1 Cap by mouth daily. , Disp: 90 Cap, Rfl: 3    traMADol-acetaminophen (ULTRACET) 37.5-325 mg per tablet, Take 1 Tab by mouth every six (6) hours as needed for Pain. Max Daily Amount: 4 Tabs., Disp: 30 Tab, Rfl: 2    GLUC/CHND/OM3/DHA/EPA/FISH/STR (GLUCOSAMINE CHONDROITIN PLUS PO), Take 2 Caplet by mouth daily. , Disp: , Rfl:     omeprazole (PRILOSEC) 20 mg capsule, Take 1 capsule by mouth  daily, Disp: 90 Cap, Rfl: 3    simvastatin (ZOCOR) 20 mg tablet, Take 1 tablet by mouth  nightly, Disp: 90 Tab, Rfl: 3    warfarin (COUMADIN) 5 mg tablet, 1 to 1&1/2 tabs every day or as directed (Patient taking differently: Take 5 mg by mouth daily. 1 to 1&1/2 tabs every day or as directed  Pt states he take 7.5mg Monday and Friday and 5mg on all other days), Disp: 120 Tab, Rfl: 3    insulin aspart (NOVOLOG) 100 unit/mL injection, by SubCUTAneous route as needed. 6 am, lunch 4 ; 6 supper-plus few units by scale if high, Disp: , Rfl:     clobetasol (TEMOVATE) 0.05 % ointment, Apply  to affected area two (2) times a day., Disp: 15 g, Rfl: 12    levothyroxine (SYNTHROID) 125 mcg tablet, Take 1 Tab by mouth Daily (before breakfast). , Disp: 90 Tab, Rfl: 3    lisinopril (ZESTRIL) 2.5 mg tablet, Take 1 Tab by mouth daily. , Disp: 90 Tab, Rfl: 3    metOLazone (ZAROXOLYN) 2.5 mg tablet, Take 1 Tab by mouth as needed. , Disp: 90 Tab, Rfl: 1    lancets 33 gauge misc, Test BS TID/PRN Dx E11.9.  Pt uses one touch delica 33 gauge lancets, Disp: 1 Package, Rfl: 12    Insulin Needles, Disposable, (BD INSULIN PEN NEEDLE UF MINI) 31 gauge x 3/16\" ndle, Use as directed, E11.9, Disp: 100 Pen Needle, Rfl: 12    lancets (ONETOUCH DELICA LANCETS) 30 gauge misc, Testing TID/PRN, E11.9, Disp: 1 Package, Rfl: 12    glucose blood VI test strips (ONETOUCH ULTRA TEST) strip, TEST BS TID AM DX E11.22, Disp: 100 Strip, Rfl: 12    aspirin delayed-release 81 mg tablet, Take 81 mg by mouth daily., Disp: , Rfl:     multivitamin (ONE A DAY) tablet, Take 1 Tab by mouth daily. , Disp: , Rfl:     insulin glargine (LANTUS) 100 unit/mL injection, 16 Units by SubCUTAneous route. As directed , Disp: , Rfl:     ferrous sulfate (IRON) 325 mg (65 mg iron) tablet, Take 65 mg by mouth Daily (before breakfast). , Disp: , Rfl:    Date Last Reviewed:  1/22/2018   Number of Personal Factors/Comorbidities that affect the Plan of Care: 3+: HIGH COMPLEXITY   EXAMINATION:   Observation/Orthostatic Postural Assessment:          Pt is a large man, walks with rollator, sits comfortably on rollator. Has slight difficulty with sit to stand. Sits with poor posture; stands with slight trunk/ hip flexion. Slightly [place]. Palpation:          Some bilat lower leg swelling. Flat area palpable at superior aspect of flexed R knee ( distal quad atrophy?). Varicose veins prominent in R leg. ROM:            PROM Date:   12-5 Date:  1-11 Date:     R hip flex ~ 85 °    90+     R hip abd ~ 25 °     35 °     R hip ER ~ 20 °  ~ 20 °           AROM:  R flex ~ 35 °      R abd ~ 20 °                Strength:           Date:  12-5 Date:  1-11 Date:     R hip flex 3- 3- to 3    R   Hip abd 2- to 2 2+ to 3-    R knee extn 4- 4    R knee flex 3+ to 4- 4    R DF 4           L hip flex 3+ 4    L knee extn 4 4+ to 5    L knee flex 4- 4    L DF 4-         Neurological Screen:        Sensation: Pt denies any sensory changes, denies peripheral neuropathy in LEs  Functional Mobility:         Gait/Ambulation:  ambs with rollator, shuffling gait, slight lean forward. Can walk short distances without walker ( eg, to the toilet). Pt reports that on good days, he can walk as far as about 4-5 blocks. Bed Mobility:  Pt reports that he needs some asst at home to get legs into bed. On the mat in PT, he needed very min asst for R leg. Stairs:  No stairs in the home. Balance:          NT on initial assessment.      Body Structures Involved:  1. Nerves  2. Bones  3. Joints  4. Muscles  5. Ligaments Body Functions Affected:  1. Mental  2. Sensory/Pain  3. Neuromusculoskeletal  4. Movement Related Activities and Participation Affected:  1. General Tasks and Demands  2. Mobility  3. Self Care  4. Domestic Life  5. Interpersonal Interactions and Relationships  6. Community, Social and [place]   Number of elements (examined above) that affect the Plan of Care: 3: MODERATE COMPLEXITY   CLINICAL PRESENTATION:   Presentation: Evolving clinical presentation with changing clinical characteristics: MODERATE COMPLEXITY   CLINICAL DECISION MAKING:   Outcome Measure: Tool Used: Lower Extremity Functional Scale (LEFS)  Score:  Initial: 25/80 Most Recent: 37/80 (Date: 1-11 )   Interpretation of Score: 20 questions each scored on a 5 point scale with 0 representing \"extreme difficulty or unable to perform\" and 4 representing \"no difficulty\". The lower the score, the greater the functional disability. 80/80 represents no disability. Minimal detectable change is 9 points. Score 80 79-63 62-48 47-32 31-16 15-1 0   Modifier CH CI CJ CK CL CM CN     ? Mobility - Walking and Moving Around:     - CURRENT STATUS: CK - 40%-59% impaired, limited or restricted    - GOAL STATUS: CK - 40%-59% impaired, limited or restricted    - D/C STATUS:  ---------------To be determined---------------      Medical Necessity:   · Skilled intervention continues to be required due to lack of hip ROM and strength, which severely impairs function. Reason for Services/Other Comments:  · Patient continues to demonstrate capacity to improve flexibility, ROM and function which will increase independence.    Use of outcome tool(s) and clinical judgement create a POC that gives a: Questionable prediction of patient's progress: MODERATE COMPLEXITY            TREATMENT:   (In addition to Assessment/Re-Assessment sessions the following treatments were rendered)    Pre-treatment Symptoms/Complaints:  Patient stated that the cortisone injection L knee has not helped this time like it usually does. His R hip is feeling much better overall. Pain: Initial: Pain Intensity 1: 2  Post Session:  1     Therapeutic Exercise   ( 60 minutes ):  To decrease pain, improve flexibility and motion, and increase strength. Will provide verbal and manual cues as needed to ensure proper performance of the exercises. Will increase range of motion, resistance and intensity as pt tolerates. Nustep x 8 min, level 4  Lower trunk rotation--10 reps  Thorough stretching for hip abd and ER, IR in supine, passive SLR  Active R hip flexion and extn, against manual resistance---1 x 12  Bridging--2 x 10--with legs on traction stool  SLR--2 x 10  Patella mobs for L knee, passive extn stretch  Step ups on 6 inch step x 20  Standing hip abd--19#--2 1 x 12  Standing hip flexion--19#--2 x 12  Standing UE pull downs--bilat, 35#--1 x 15, then 1 x 15 while standing on airex  standing trunk rotation--25#--1 x 12 each direction--moderate difficulty with this. Observed pt getting into and out of the 's side of the car--a goal of his. He is sufficiently able to do this and now will practice driving in his neighborhood. [employer]  Treatment/Session Assessment:  Pt tolerated treatment with no discomfort. He continues to ambulate without any type of assistive device and seems very pleased. Patient demonstrated good effort with all exercises and seems pleased with his progress. Patient plans to try some driving soon to see  how his knees feel. He remains well motivated. Good compliance with home exercise. · Response to Treatment:    Pt seemed to enjoy some of the stretching. Reports that knees feel better after patella mobilization. .  · Compliance with Program/Exercises:     seems to be compliant;   His goal is that he can return to driving, and walking better with less pain.  · Recommendations/Intent for next treatment session:   Next visit will focus on improving passive and active R hip motion and strength, to allow better function. .  Total Treatment Duration: 60 min  Total number of treatments:   12  PT Patient Time In/Time Out  Time In: 0215  Time Out: [address] PT

## 2018-01-25 ENCOUNTER — HOSPITAL ENCOUNTER (OUTPATIENT)
Dept: PHYSICAL THERAPY | Age: 83
Discharge: HOME | End: 2018-01-25
Payer: MEDICARE

## 2018-01-25 PROCEDURE — 97110 THERAPEUTIC EXERCISES: CPT

## 2018-01-25 NOTE — PROGRESS NOTES
Millie Guillermo  : 1935  [place] / [place] / Product Type: Medicare /  [id number]  at [facility]  [address], [address]  [phone]   [phone]        OUTPATIENT PHYSICAL THERAPY:  Daily Note   2018    ICD-10: Treatment Diagnosis: M25.551  Pain in R hip  M25.561  Stiffness in R hip  R26.2  Difficulty walking  Precautions/Allergies:   Latex   Fall Risk Score: 2 (? 5 = High Risk)  MD Orders: eval and treat MEDICAL/REFERRING DIAGNOSIS:  Unsteadiness on feet [R26.81]   DATE OF ONSET: about 3 months ago  REFERRING PHYSICIAN: Krystin Vazquez MD  RETURN PHYSICIAN APPOINTMENT: TBD      ASSESSMENT:  Mr. Ventura Jones presents to PT with referral for treatment in the pool due to severe arthritis of the R hip, as well as RLE weakness and difficulty walking. He has recently had home health PT to help with mobility. He has multiple medical co-morbidities, including difficulty with stairs that may make getting into and out of our pool very difficult. We discussed an initial plan of PT in the gym, to help improve flexibility and general strength, then probable transition to the pool. He seems well motivated; wife is supportive. I expect progress to be slow due to multiple medical problems and decreased tolerance for exercise. Pt has attended 9 visits to PT, for treatment of R hip and knee pain. He is making steady progress. He is doing more walking without his walker, and reports that getting into and out of the bed and the car are noticeably easier now. He still demonstrates r hip weakness and gait is still wide-based and waddling, but he appears steady. His active range is improving as well. He remains motivated and eager to continue with PT to improve mobility, decrease pain, and hopefully be able to return to driving. PROBLEM LIST (Impacting functional limitations):  1. Decreased Strength  2.  Decreased ADL/Functional Activities  3. Decreased Ambulation Ability/Technique  4. Decreased Balance  5. Increased Pain  6. Decreased Activity Tolerance  7. Decreased Flexibility/Joint Mobility  8. Decreased Gage with Home Exercise Program INTERVENTIONS PLANNED:  1. Balance Exercise  2. Bed Mobility  3. Gait Training  4. Home Exercise Program (HEP)  5. Range of Motion (ROM)  6. Therapeutic Exercise/Strengthening   7. Aquatics   TREATMENT PLAN:  Effective Dates: 12-5-2017 TO 3-1-2018. Frequency/Duration: 2 times a week for 8 weeks, and upon reassessment will adjust frequency and duration as progress indicates. GOALS: (Goals have been discussed and agreed upon with patient.)  Short-Term Functional Goals: Time Frame: 4 weeks  1. Improve R hip PROM to 90 + ° for ease in sitting and rising-- goal met for passive motion, not actively  2. Improve R hip strength to allow 1 x 10 SLR-- goal met  3. Improve ease of moving to allow pt to get in and out of bed without asst-- goal met  Discharge Goals: Time Frame: 12 weeks  1. Pt able to don socks and shoes with no more than set up asst-- not yet met  2. Improve strength and stability BLEs to allow reciprocal stair ascending-- on going  3. Pt to tolerate 30+ min exercise in the pool without increased sx-- not assessed--pt prefers PT in the gym  4. Pt to be independent in basic HEP for ROM and strengthening ex  5. Rehabilitation Potential For Stated Goals: [id number] [facility] [place] therapy, I certify that the treatment plan above will be carried out by a therapist or under their direction. Signed By: Everardo Pichardo PT     January 25, 2018                       The information in this section was collected on 12-5 (except where otherwise noted). 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   His pain and weakness got much worse suddenly after MRI--he blames it on having to lie on the hard table for so long. Since that time about 2 months ago, his pain is worse and weakness is worse as well. Past Medical History/Comorbidities:   Mr. Peng Guerin  has a past medical history of Arthritis; Atrial fibrillation ([facility] CAD (coronary artery disease); Cancer ([facility] DM (diabetes mellitus), type 2 ([facility] Family history of prostate problems; GERD (gastroesophageal reflux disease); Hearing difficulty; HLD (hyperlipidemia); Hypertension; Hypothyroidism; Insulin dependent diabetes mellitus ([facility] Kidney problem; and Systolic heart failure ([facility] Mr. Peng Guerin  has a past surgical history that includes hx coronary artery bypass graft; hx other surgical; hx other surgical; hx cataract removal (04/2011); hx tonsillectomy; hx hernia repair (08/2004); hx appendectomy (08/2004); hx prostatectomy (03/01/2006); pr cardiac surg procedure unlist (06/2007); hx aortic valve replacement (11/10/2011); pr cardiac surg procedure unlist (11/14/2011); pr cardiac surg procedure unlist (05/10/2013); hx orthopaedic (1973); and hx knee replacement (04/25/2016). R TKA 2015. CABG in 2007. Pacemaker and defibrillator in 2015, due to silent MI. Pt reports that he is in Afib all the time. Has IDDM, aortic calcification, HTN. Social History/Living Environment:     . Lives with wife in 1 level house. Has only  Lived in [place] 1 yr, moved from [place]  Prior Level of Function/Work/Activity:  Had been more mobile, with less pain prior to this episode of hip pain. Retired. Dominant Side:         RIGHT  Personal Factors:          Age:  81 y.o. Current Medications:       Current Outpatient Prescriptions:     furosemide (LASIX) 40 mg tablet, TAKE 1 TABLET BY MOUTH TWO  TIMES DAILY, Disp: 180 Tab, Rfl: 3    carvedilol (COREG) 6.25 mg tablet, TAKE 1 TABLET BY MOUTH TWO  TIMES DAILY WITH MEALS, Disp: 180 Tab, Rfl: 3    potassium chloride (K-DUR, KLOR-CON) 20 mEq tablet, Take 1 Tab by mouth daily. , Disp: 90 Tab, Rfl: 3    tamsulosin (FLOMAX) 0.4 mg capsule, Take 1 Cap by mouth daily. , Disp: 90 Cap, Rfl: 3    traMADol-acetaminophen (ULTRACET) 37.5-325 mg per tablet, Take 1 Tab by mouth every six (6) hours as needed for Pain. Max Daily Amount: 4 Tabs., Disp: 30 Tab, Rfl: 2    GLUC/CHND/OM3/DHA/EPA/FISH/STR (GLUCOSAMINE CHONDROITIN PLUS PO), Take 2 Caplet by mouth daily. , Disp: , Rfl:     omeprazole (PRILOSEC) 20 mg capsule, Take 1 capsule by mouth  daily, Disp: 90 Cap, Rfl: 3    simvastatin (ZOCOR) 20 mg tablet, Take 1 tablet by mouth  nightly, Disp: 90 Tab, Rfl: 3    warfarin (COUMADIN) 5 mg tablet, 1 to 1&1/2 tabs every day or as directed (Patient taking differently: Take 5 mg by mouth daily. 1 to 1&1/2 tabs every day or as directed  Pt states he take 7.5mg Monday and Friday and 5mg on all other days), Disp: 120 Tab, Rfl: 3    insulin aspart (NOVOLOG) 100 unit/mL injection, by SubCUTAneous route as needed. 6 am, lunch 4 ; 6 supper-plus few units by scale if high, Disp: , Rfl:     clobetasol (TEMOVATE) 0.05 % ointment, Apply  to affected area two (2) times a day., Disp: 15 g, Rfl: 12    levothyroxine (SYNTHROID) 125 mcg tablet, Take 1 Tab by mouth Daily (before breakfast). , Disp: 90 Tab, Rfl: 3    lisinopril (ZESTRIL) 2.5 mg tablet, Take 1 Tab by mouth daily. , Disp: 90 Tab, Rfl: 3    metOLazone (ZAROXOLYN) 2.5 mg tablet, Take 1 Tab by mouth as needed. , Disp: 90 Tab, Rfl: 1    lancets 33 gauge misc, Test BS TID/PRN Dx E11.9.  Pt uses one touch delica 33 gauge lancets, Disp: 1 Package, Rfl: 12    Insulin Needles, Disposable, (BD INSULIN PEN NEEDLE UF MINI) 31 gauge x 3/16\" ndle, Use as directed, E11.9, Disp: 100 Pen Needle, Rfl: 12    lancets (ONETOUCH DELICA LANCETS) 30 gauge misc, Testing TID/PRN, E11.9, Disp: 1 Package, Rfl: 12    glucose blood VI test strips (ONETOUCH ULTRA TEST) strip, TEST BS TID AM DX E11.22, Disp: 100 Strip, Rfl: 12    aspirin delayed-release 81 mg tablet, Take 81 mg by mouth daily., Disp: , Rfl:     multivitamin (ONE A DAY) tablet, Take 1 Tab by mouth daily. , Disp: , Rfl:     insulin glargine (LANTUS) 100 unit/mL injection, 16 Units by SubCUTAneous route. As directed , Disp: , Rfl:     ferrous sulfate (IRON) 325 mg (65 mg iron) tablet, Take 65 mg by mouth Daily (before breakfast). , Disp: , Rfl:    Date Last Reviewed:  1/25/2018   Number of Personal Factors/Comorbidities that affect the Plan of Care: 3+: HIGH COMPLEXITY   EXAMINATION:   Observation/Orthostatic Postural Assessment:          Pt is a large man, walks with rollator, sits comfortably on rollator. Has slight difficulty with sit to stand. Sits with poor posture; stands with slight trunk/ hip flexion. Slightly [place]. Palpation:          Some bilat lower leg swelling. Flat area palpable at superior aspect of flexed R knee ( distal quad atrophy?). Varicose veins prominent in R leg. ROM:            PROM Date:   12-5 Date:  1-11 Date:     R hip flex ~ 85 °    90+     R hip abd ~ 25 °     35 °     R hip ER ~ 20 °  ~ 20 °           AROM:  R flex ~ 35 °      R abd ~ 20 °                Strength:           Date:  12-5 Date:  1-11 Date:     R hip flex 3- 3- to 3    R   Hip abd 2- to 2 2+ to 3-    R knee extn 4- 4    R knee flex 3+ to 4- 4    R DF 4           L hip flex 3+ 4    L knee extn 4 4+ to 5    L knee flex 4- 4    L DF 4-         Neurological Screen:        Sensation: Pt denies any sensory changes, denies peripheral neuropathy in LEs  Functional Mobility:         Gait/Ambulation:  ambs with rollator, shuffling gait, slight lean forward. Can walk short distances without walker ( eg, to the toilet). Pt reports that on good days, he can walk as far as about 4-5 blocks. Bed Mobility:  Pt reports that he needs some asst at home to get legs into bed. On the mat in PT, he needed very min asst for R leg. Stairs:  No stairs in the home. Balance:          NT on initial assessment.      Body Structures Involved:  1. Nerves  2. Bones  3. Joints  4. Muscles  5. Ligaments Body Functions Affected:  1. Mental  2. Sensory/Pain  3. Neuromusculoskeletal  4. Movement Related Activities and Participation Affected:  1. General Tasks and Demands  2. Mobility  3. Self Care  4. Domestic Life  5. Interpersonal Interactions and Relationships  6. Community, Social and [place]   Number of elements (examined above) that affect the Plan of Care: 3: MODERATE COMPLEXITY   CLINICAL PRESENTATION:   Presentation: Evolving clinical presentation with changing clinical characteristics: MODERATE COMPLEXITY   CLINICAL DECISION MAKING:   Outcome Measure: Tool Used: Lower Extremity Functional Scale (LEFS)  Score:  Initial: 25/80 Most Recent: 37/80 (Date: 1-11 )   Interpretation of Score: 20 questions each scored on a 5 point scale with 0 representing \"extreme difficulty or unable to perform\" and 4 representing \"no difficulty\". The lower the score, the greater the functional disability. 80/80 represents no disability. Minimal detectable change is 9 points. Score 80 79-63 62-48 47-32 31-16 15-1 0   Modifier CH CI CJ CK CL CM CN     ? Mobility - Walking and Moving Around:     - CURRENT STATUS: CK - 40%-59% impaired, limited or restricted    - GOAL STATUS: CK - 40%-59% impaired, limited or restricted    - D/C STATUS:  ---------------To be determined---------------      Medical Necessity:   · Skilled intervention continues to be required due to lack of hip ROM and strength, which severely impairs function. Reason for Services/Other Comments:  · Patient continues to demonstrate capacity to improve flexibility, ROM and function which will increase independence.    Use of outcome tool(s) and clinical judgement create a POC that gives a: Questionable prediction of patient's progress: MODERATE COMPLEXITY            TREATMENT:   (In addition to Assessment/Re-Assessment sessions the following treatments were rendered)    Pre-treatment Symptoms/Complaints:  Pt states that his R hip is feeling much better overall. His knees are really more bothersome now. Pain: Initial: Pain Intensity 1: 2  Post Session:  1     Therapeutic Exercise   ( 60 minutes ):  To decrease pain, improve flexibility and motion, and increase strength. Will provide verbal and manual cues as needed to ensure proper performance of the exercises. Will increase range of motion, resistance and intensity as pt tolerates. Standing stretching for total body for warm up. Generalized stiffness is obvious throughout. Lower trunk rotation--10 reps  Thorough stretching for hip abd and ER, IR in supine, passive SLR  Active R hip flexion and extn, against manual resistance---1 x 12  Supine with LEs on ball, trunk extn  SLR--2 x 10 each leg. VC needed for R leg to maintain extn. Step ups on 6 inch step x 20  Lateral step downs from 6\"--1 x 15 each leg  Tried trunk rotation at cables with 20#--pt not able to do this well. Spent more time just stretching for standing rotation with manual asst.    Standing hip abd--19#--2 1 x 12  Standing hip flexion--19#--2 x 12  Repeated sit to stand from mat--10 reps. Could not do this from chair due to lack of knee flexion       [employer] Portal  Treatment/Session Assessment:  Pt tolerated treatment with no discomfort. He continues to ambulate without any type of assistive device and seems very pleased. With cues, gait can be better--otherwise pt had excess lateral shifting and little to no rotation. Patient demonstrated good effort with all exercises and seems pleased with his progress. Patient plans to try some driving soon to see  how his knees feel. He remains well motivated. Good compliance with home exercise. · Response to Treatment:    Pt seemed to enjoy some of the stretching. Reports that knees feel better after patella mobilization. .  · Compliance with Program/Exercises:     seems to be compliant;   His goal is that he can return to driving, and walking better with less pain. · Recommendations/Intent for next treatment session:   Next visit will focus on improving passive and active R hip motion and strength, to allow better function. .  Total Treatment Duration: 60 min  Total number of treatments:   13  PT Patient Time In/Time Out  Time In: 0200  Time Out: One Rosa Betancourt, JESSICA

## 2018-01-29 ENCOUNTER — HOSPITAL ENCOUNTER (OUTPATIENT)
Dept: PHYSICAL THERAPY | Age: 83
Discharge: HOME | End: 2018-01-29
Payer: MEDICARE

## 2018-01-29 PROCEDURE — 97110 THERAPEUTIC EXERCISES: CPT

## 2018-01-29 NOTE — PROGRESS NOTES
Gladis Centeno  : 1935  [place] / [place] / Product Type: Medicare /  [address]  at [place] [address] Therapy  [address], [address]  [phone]   [phone]        OUTPATIENT PHYSICAL THERAPY:  Daily Note   2018    ICD-10: Treatment Diagnosis: M25.551  Pain in R hip  M25.561  Stiffness in R hip  R26.2  Difficulty walking  Precautions/Allergies:   Latex   Fall Risk Score: 2 (? 5 = High Risk)  MD Orders: eval and treat MEDICAL/REFERRING DIAGNOSIS:  Unsteadiness on feet [R26.81]   DATE OF ONSET: about 3 months ago  REFERRING PHYSICIAN: Merlin Manna, MD  RETURN PHYSICIAN APPOINTMENT: TBD      ASSESSMENT:  Mr. Aravind Jasso presents to PT with referral for treatment in the pool due to severe arthritis of the R hip, as well as RLE weakness and difficulty walking. He has recently had home health PT to help with mobility. He has multiple medical co-morbidities, including difficulty with stairs that may make getting into and out of our pool very difficult. We discussed an initial plan of PT in the gym, to help improve flexibility and general strength, then probable transition to the pool. He seems well motivated; wife is supportive. I expect progress to be slow due to multiple medical problems and decreased tolerance for exercise. Pt has attended 9 visits to PT, for treatment of R hip and knee pain. He is making steady progress. He is doing more walking without his walker, and reports that getting into and out of the bed and the car are noticeably easier now. He still demonstrates r hip weakness and gait is still wide-based and waddling, but he appears steady. His active range is improving as well. He remains motivated and eager to continue with PT to improve mobility, decrease pain, and hopefully be able to return to driving. PROBLEM LIST (Impacting functional limitations):  1. Decreased Strength  2.  Decreased ADL/Functional Activities  3. Decreased Ambulation Ability/Technique  4. Decreased Balance  5. Increased Pain  6. Decreased Activity Tolerance  7. Decreased Flexibility/Joint Mobility  8. Decreased [place] with Home Exercise Program INTERVENTIONS PLANNED:  1. Balance Exercise  2. Bed Mobility  3. Gait Training  4. Home Exercise Program (HEP)  5. Range of Motion (ROM)  6. Therapeutic Exercise/Strengthening   7. Aquatics   TREATMENT PLAN:  Effective Dates: 12-5-2017 TO 3-1-2018. Frequency/Duration: 2 times a week for 8 weeks, and upon reassessment will adjust frequency and duration as progress indicates. GOALS: (Goals have been discussed and agreed upon with patient.)  Short-Term Functional Goals: Time Frame: 4 weeks  1. Improve R hip PROM to 90 + ° for ease in sitting and rising-- goal met for passive motion, not actively  2. Improve R hip strength to allow 1 x 10 SLR-- goal met  3. Improve ease of moving to allow pt to get in and out of bed without asst-- goal met  Discharge Goals: Time Frame: 12 weeks  1. Pt able to don socks and shoes with no more than set up asst-- not yet met  2. Improve strength and stability BLEs to allow reciprocal stair ascending-- on going  3. Pt to tolerate 30+ min exercise in the pool without increased sx-- not assessed--pt prefers PT in the gym  4. Pt to be independent in basic HEP for ROM and strengthening ex  5. Rehabilitation Potential For Stated Goals: [id number] [facility] therapy, I certify that the treatment plan above will be carried out by a therapist or under their direction. Signed By: Roly Perdomo, PT     January 29, 2018                       The information in this section was collected on 12-5 (except where otherwise noted). 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   His pain and weakness got much worse suddenly after MRI--he blames it on having to lie on the hard table for so long. Since that time about 2 months ago, his pain is worse and weakness is worse as well. Past Medical History/Comorbidities:   Mr. Raymundo Guzman  has a past medical history of Arthritis; Atrial fibrillation ([facility] [address] CAD (coronary artery disease); Cancer ([facility] [address] DM (diabetes mellitus), type 2 ([address] Family history of prostate problems; GERD (gastroesophageal reflux disease); Hearing difficulty; HLD (hyperlipidemia); Hypertension; Hypothyroidism; Insulin dependent diabetes mellitus ([facility] [address] Kidney problem; and Systolic heart failure ([facility] [address] Mr. Raymundo Guzman  has a past surgical history that includes hx coronary artery bypass graft; hx other surgical; hx other surgical; hx cataract removal (04/2011); hx tonsillectomy; hx hernia repair (08/2004); hx appendectomy (08/2004); hx prostatectomy (03/01/2006); pr cardiac surg procedure unlist (06/2007); hx aortic valve replacement (11/10/2011); pr cardiac surg procedure unlist (11/14/2011); pr cardiac surg procedure unlist (05/10/2013); hx orthopaedic (1973); and hx knee replacement (04/25/2016). R TKA 2015. CABG in 2007. Pacemaker and defibrillator in 2015, due to silent MI. Pt reports that he is in Afib all the time. Has IDDM, aortic calcification, HTN. Social History/Living Environment:     . Lives with wife in 1 level house. Has only  Lived in [place] 1 yr, moved from [place]  Prior Level of Function/Work/Activity:  Had been more mobile, with less pain prior to this episode of hip pain. Retired. Dominant Side:         RIGHT  Personal Factors:          Age:  81 y.o. Current Medications:       Current Outpatient Prescriptions:     furosemide (LASIX) 40 mg tablet, TAKE 1 TABLET BY MOUTH TWO  TIMES DAILY, Disp: 180 Tab, Rfl: 3    carvedilol (COREG) 6.25 mg tablet, TAKE 1 TABLET BY MOUTH TWO  TIMES DAILY WITH MEALS, Disp: 180 Tab, Rfl: 3    potassium chloride (K-DUR, KLOR-CON) 20 mEq tablet, Take 1 Tab by mouth daily. , Disp: 90 Tab, Rfl: 3    tamsulosin (FLOMAX) 0.4 mg capsule, Take 1 Cap by mouth daily. , Disp: 90 Cap, Rfl: 3    traMADol-acetaminophen (ULTRACET) 37.5-325 mg per tablet, Take 1 Tab by mouth every six (6) hours as needed for Pain. Max Daily Amount: 4 Tabs., Disp: 30 Tab, Rfl: 2    GLUC/CHND/OM3/DHA/EPA/FISH/STR (GLUCOSAMINE CHONDROITIN PLUS PO), Take 2 Caplet by mouth daily. , Disp: , Rfl:     omeprazole (PRILOSEC) 20 mg capsule, Take 1 capsule by mouth  daily, Disp: 90 Cap, Rfl: 3    simvastatin (ZOCOR) 20 mg tablet, Take 1 tablet by mouth  nightly, Disp: 90 Tab, Rfl: 3    warfarin (COUMADIN) 5 mg tablet, 1 to 1&1/2 tabs every day or as directed (Patient taking differently: Take 5 mg by mouth daily. 1 to 1&1/2 tabs every day or as directed  Pt states he take 7.5mg Monday and Friday and 5mg on all other days), Disp: 120 Tab, Rfl: 3    insulin aspart (NOVOLOG) 100 unit/mL injection, by SubCUTAneous route as needed. 6 am, lunch 4 ; 6 supper-plus few units by scale if high, Disp: , Rfl:     clobetasol (TEMOVATE) 0.05 % ointment, Apply  to affected area two (2) times a day., Disp: 15 g, Rfl: 12    levothyroxine (SYNTHROID) 125 mcg tablet, Take 1 Tab by mouth Daily (before breakfast). , Disp: 90 Tab, Rfl: 3    lisinopril (ZESTRIL) 2.5 mg tablet, Take 1 Tab by mouth daily. , Disp: 90 Tab, Rfl: 3    metOLazone (ZAROXOLYN) 2.5 mg tablet, Take 1 Tab by mouth as needed. , Disp: 90 Tab, Rfl: 1    lancets 33 gauge misc, Test BS TID/PRN Dx E11.9.  Pt uses one touch delica 33 gauge lancets, Disp: 1 Package, Rfl: 12    Insulin Needles, Disposable, (BD INSULIN PEN NEEDLE UF MINI) 31 gauge x 3/16\" ndle, Use as directed, E11.9, Disp: 100 Pen Needle, Rfl: 12    lancets (ONETOUCH DELICA LANCETS) 30 gauge misc, Testing TID/PRN, E11.9, Disp: 1 Package, Rfl: 12    glucose blood VI test strips (ONETOUCH ULTRA TEST) strip, TEST BS TID AM DX E11.22, Disp: 100 Strip, Rfl: 12    aspirin delayed-release 81 mg tablet, Take 81 mg by mouth daily., Disp: , Rfl:     multivitamin (ONE A DAY) tablet, Take 1 Tab by mouth daily. , Disp: , Rfl:     insulin glargine (LANTUS) 100 unit/mL injection, 16 Units by SubCUTAneous route. As directed , Disp: , Rfl:     ferrous sulfate (IRON) 325 mg (65 mg iron) tablet, Take 65 mg by mouth Daily (before breakfast). , Disp: , Rfl:    Date Last Reviewed:  1/29/2018   Number of Personal Factors/Comorbidities that affect the Plan of Care: 3+: HIGH COMPLEXITY   EXAMINATION:   Observation/Orthostatic Postural Assessment:          Pt is a large man, walks with rollator, sits comfortably on rollator. Has slight difficulty with sit to stand. Sits with poor posture; stands with slight trunk/ hip flexion. Slightly [place]. Palpation:          Some bilat lower leg swelling. Flat area palpable at superior aspect of flexed R knee ( distal quad atrophy?). Varicose veins prominent in R leg. ROM:            PROM Date:   12-5 Date:  1-11 Date:     R hip flex ~ 85 °    90+     R hip abd ~ 25 °     35 °     R hip ER ~ 20 °  ~ 20 °           AROM:  R flex ~ 35 °      R abd ~ 20 °                Strength:           Date:  12-5 Date:  1-11 Date:     R hip flex 3- 3- to 3    R   Hip abd 2- to 2 2+ to 3-    R knee extn 4- 4    R knee flex 3+ to 4- 4    R DF 4           L hip flex 3+ 4    L knee extn 4 4+ to 5    L knee flex 4- 4    L DF 4-         Neurological Screen:        Sensation: Pt denies any sensory changes, denies peripheral neuropathy in LEs  Functional Mobility:         Gait/Ambulation:  ambs with rollator, shuffling gait, slight lean forward. Can walk short distances without walker ( eg, to the toilet). Pt reports that on good days, he can walk as far as about 4-5 blocks. Bed Mobility:  Pt reports that he needs some asst at home to get legs into bed. On the mat in PT, he needed very min asst for R leg. Stairs:  No stairs in the home. Balance:          NT on initial assessment.      Body Structures Involved:  1. Nerves  2. Bones  3. Joints  4. Muscles  5. Ligaments Body Functions Affected:  1. Mental  2. Sensory/Pain  3. Neuromusculoskeletal  4. Movement Related Activities and Participation Affected:  1. General Tasks and Demands  2. Mobility  3. Self Care  4. Domestic Life  5. Interpersonal Interactions and Relationships  6. Community, Social and [place]   Number of elements (examined above) that affect the Plan of Care: 3: MODERATE COMPLEXITY   CLINICAL PRESENTATION:   Presentation: Evolving clinical presentation with changing clinical characteristics: MODERATE COMPLEXITY   CLINICAL DECISION MAKING:   Outcome Measure: Tool Used: Lower Extremity Functional Scale (LEFS)  Score:  Initial: 25/80 Most Recent: 37/80 (Date: 1-11 )   Interpretation of Score: 20 questions each scored on a 5 point scale with 0 representing \"extreme difficulty or unable to perform\" and 4 representing \"no difficulty\". The lower the score, the greater the functional disability. 80/80 represents no disability. Minimal detectable change is 9 points. Score 80 79-63 62-48 47-32 31-16 15-1 0   Modifier CH CI CJ CK CL CM CN     ? Mobility - Walking and Moving Around:     - CURRENT STATUS: CK - 40%-59% impaired, limited or restricted    - GOAL STATUS: CK - 40%-59% impaired, limited or restricted    - D/C STATUS:  ---------------To be determined---------------      Medical Necessity:   · Skilled intervention continues to be required due to lack of hip ROM and strength, which severely impairs function. Reason for Services/Other Comments:  · Patient continues to demonstrate capacity to improve flexibility, ROM and function which will increase independence.    Use of outcome tool(s) and clinical judgement create a POC that gives a: Questionable prediction of patient's progress: MODERATE COMPLEXITY            TREATMENT:   (In addition to Assessment/Re-Assessment sessions the following treatments were rendered)    Pre-treatment Symptoms/Complaints:   His knees are really more bothersome now than the R hip. He went to dinner and the [facility] over the weekend and did ok. Pain: Initial: Pain Intensity 1: 3  Post Session:  1     Therapeutic Exercise   ( 55 minutes ):  To decrease pain, improve flexibility and motion, and increase strength. Will provide verbal and manual cues as needed to ensure proper performance of the exercises. Will increase range of motion, resistance and intensity as pt tolerates. Standing stretching for total body for warm up. Generalized stiffness is obvious throughout. Thorough stretching for hip abd and ER, IR in supine---had pt stretch for r hip flexion using a towel  Active R hip flexion and extn, against manual resistance---1 x 12  R hip flexion from supine with leg off the table--2 x 8  SLR--2 x 10 each leg. VC needed for R leg to maintain extn. Step ups on 6 \" step ---1 x x 20  Lateral step downs from 6\"--1 x 15 each leg    Standing hip abd--19#--2  x 12  Standing hip flexion--19#--2 x 12         Med[employer] Portal  Treatment/Session Assessment:  Pt tolerated treatment with no discomfort. He continues to ambulate without any type of assistive device in the clinic ( uses rollator otherwise) and seems very pleased. With cues, gait can be better--otherwise pt has excess lateral shifting and little to no rotation. Patient demonstrated good effort with all exercises and seems pleased with his progress. Patient plans to try some driving soon to see  how his knees feel, since he has met his goal of being able to get into the 's side. He remains well motivated. Good compliance with home exercise. Started to discuss weaning from therapy, and transition to more independent exercise program.  Pt was quite distressed to think about stopping PT.        · Response to Treatment:    Pt seemed to enjoy some of the stretching. Reports that knees feel better after patella mobilization. [place] · Compliance with Program/Exercises:     seems to be compliant; His goal is that he can return to driving, and walking better with less pain. · Recommendations/Intent for next treatment session:   Next visit will focus on improving passive and active R hip motion and strength, to allow better function. .  Total Treatment Duration: 55 min  Total number of treatments:   14  PT Patient Time In/Time Out  Time In: 0205  Time Out: One Rosa Betancourt, PT

## 2018-02-01 ENCOUNTER — HOSPITAL ENCOUNTER (OUTPATIENT)
Dept: PHYSICAL THERAPY | Age: 83
Discharge: HOME | End: 2018-02-01
Payer: MEDICARE

## 2018-02-01 PROCEDURE — 97110 THERAPEUTIC EXERCISES: CPT

## 2018-02-01 NOTE — PROGRESS NOTES
Winston Valdez  : 1935  [place] / [place] / Product Type: Medicare /  [address]  at [place] [address] Therapy  [address], [address]  [phone]   [phone]        OUTPATIENT PHYSICAL THERAPY:  Daily Note   2018    ICD-10: Treatment Diagnosis: M25.551  Pain in R hip  M25.561  Stiffness in R hip  R26.2  Difficulty walking  Precautions/Allergies:   Latex   Fall Risk Score: 2 (? 5 = High Risk)  MD Orders: eval and treat MEDICAL/REFERRING DIAGNOSIS:  Unsteadiness on feet [R26.81]   DATE OF ONSET: about 3 months ago  REFERRING PHYSICIAN: Alize Holloway, MD  RETURN PHYSICIAN APPOINTMENT: TBD      ASSESSMENT:  Mr. Chanelle Dunaway presents to PT with referral for treatment in the pool due to severe arthritis of the R hip, as well as RLE weakness and difficulty walking. He has recently had home health PT to help with mobility. He has multiple medical co-morbidities, including difficulty with stairs that may make getting into and out of our pool very difficult. We discussed an initial plan of PT in the gym, to help improve flexibility and general strength, then probable transition to the pool. He seems well motivated; wife is supportive. I expect progress to be slow due to multiple medical problems and decreased tolerance for exercise. Pt has attended 9 visits to PT, for treatment of R hip and knee pain. He is making steady progress. He is doing more walking without his walker, and reports that getting into and out of the bed and the car are noticeably easier now. He still demonstrates r hip weakness and gait is still wide-based and waddling, but he appears steady. His active range is improving as well. He remains motivated and eager to continue with PT to improve mobility, decrease pain, and hopefully be able to return to driving. PROBLEM LIST (Impacting functional limitations):  1. Decreased Strength  2.  Decreased ADL/Functional Activities  3. Decreased Ambulation Ability/Technique  4. Decreased Balance  5. Increased Pain  6. Decreased Activity Tolerance  7. Decreased Flexibility/Joint Mobility  8. Decreased [place] with Home Exercise Program INTERVENTIONS PLANNED:  1. Balance Exercise  2. Bed Mobility  3. Gait Training  4. Home Exercise Program (HEP)  5. Range of Motion (ROM)  6. Therapeutic Exercise/Strengthening   7. Aquatics   TREATMENT PLAN:  Effective Dates: 12-5-2017 TO 3-1-2018. Frequency/Duration: 2 times a week for 8 weeks, and upon reassessment will adjust frequency and duration as progress indicates. GOALS: (Goals have been discussed and agreed upon with patient.)  Short-Term Functional Goals: Time Frame: 4 weeks  1. Improve R hip PROM to 90 + ° for ease in sitting and rising-- goal met for passive motion, not actively  2. Improve R hip strength to allow 1 x 10 SLR-- goal met  3. Improve ease of moving to allow pt to get in and out of bed without asst-- goal met  Discharge Goals: Time Frame: 12 weeks  1. Pt able to don socks and shoes with no more than set up asst-- not yet met  2. Improve strength and stability BLEs to allow reciprocal stair ascending-- on going  3. Pt to tolerate 30+ min exercise in the pool without increased sx-- not assessed--pt prefers PT in the gym  4. Pt to be independent in basic HEP for ROM and strengthening ex  5. Rehabilitation Potential For Stated Goals: [id number] [facility] therapy, I certify that the treatment plan above will be carried out by a therapist or under their direction. Signed By: Vj Valencia PT     February 1, 2018                       The information in this section was collected on 12-5 (except where otherwise noted). 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   His pain and weakness got much worse suddenly after MRI--he blames it on having to lie on the hard table for so long. Since that time about 2 months ago, his pain is worse and weakness is worse as well. Past Medical History/Comorbidities:   Mr. Brittney Gómez  has a past medical history of Arthritis; Atrial fibrillation ([facility] [address] CAD (coronary artery disease); Cancer ([facility] [address] DM (diabetes mellitus), type 2 ([address] Family history of prostate problems; GERD (gastroesophageal reflux disease); Hearing difficulty; HLD (hyperlipidemia); Hypertension; Hypothyroidism; Insulin dependent diabetes mellitus ([facility] [address] Kidney problem; and Systolic heart failure ([facility] [address] Mr. Brittney Gómez  has a past surgical history that includes hx coronary artery bypass graft; hx other surgical; hx other surgical; hx cataract removal (04/2011); hx tonsillectomy; hx hernia repair (08/2004); hx appendectomy (08/2004); hx prostatectomy (03/01/2006); pr cardiac surg procedure unlist (06/2007); hx aortic valve replacement (11/10/2011); pr cardiac surg procedure unlist (11/14/2011); pr cardiac surg procedure unlist (05/10/2013); hx orthopaedic (1973); and hx knee replacement (04/25/2016). R TKA 2015. CABG in 2007. Pacemaker and defibrillator in 2015, due to silent MI. Pt reports that he is in Afib all the time. Has IDDM, aortic calcification, HTN. Social History/Living Environment:     . Lives with wife in 1 level house. Has only  Lived in [place] 1 yr, moved from [place]  Prior Level of Function/Work/Activity:  Had been more mobile, with less pain prior to this episode of hip pain. Retired. Dominant Side:         RIGHT  Personal Factors:          Age:  81 y.o. Current Medications:       Current Outpatient Prescriptions:     furosemide (LASIX) 40 mg tablet, TAKE 1 TABLET BY MOUTH TWO  TIMES DAILY, Disp: 180 Tab, Rfl: 3    carvedilol (COREG) 6.25 mg tablet, TAKE 1 TABLET BY MOUTH TWO  TIMES DAILY WITH MEALS, Disp: 180 Tab, Rfl: 3    potassium chloride (K-DUR, KLOR-CON) 20 mEq tablet, Take 1 Tab by mouth daily. , Disp: 90 Tab, Rfl: 3    tamsulosin (FLOMAX) 0.4 mg capsule, Take 1 Cap by mouth daily. , Disp: 90 Cap, Rfl: 3    traMADol-acetaminophen (ULTRACET) 37.5-325 mg per tablet, Take 1 Tab by mouth every six (6) hours as needed for Pain. Max Daily Amount: 4 Tabs., Disp: 30 Tab, Rfl: 2    GLUC/CHND/OM3/DHA/EPA/FISH/STR (GLUCOSAMINE CHONDROITIN PLUS PO), Take 2 Caplet by mouth daily. , Disp: , Rfl:     omeprazole (PRILOSEC) 20 mg capsule, Take 1 capsule by mouth  daily, Disp: 90 Cap, Rfl: 3    simvastatin (ZOCOR) 20 mg tablet, Take 1 tablet by mouth  nightly, Disp: 90 Tab, Rfl: 3    warfarin (COUMADIN) 5 mg tablet, 1 to 1&1/2 tabs every day or as directed (Patient taking differently: Take 5 mg by mouth daily. 1 to 1&1/2 tabs every day or as directed  Pt states he take 7.5mg Monday and Friday and 5mg on all other days), Disp: 120 Tab, Rfl: 3    insulin aspart (NOVOLOG) 100 unit/mL injection, by SubCUTAneous route as needed. 6 am, lunch 4 ; 6 supper-plus few units by scale if high, Disp: , Rfl:     clobetasol (TEMOVATE) 0.05 % ointment, Apply  to affected area two (2) times a day., Disp: 15 g, Rfl: 12    levothyroxine (SYNTHROID) 125 mcg tablet, Take 1 Tab by mouth Daily (before breakfast). , Disp: 90 Tab, Rfl: 3    lisinopril (ZESTRIL) 2.5 mg tablet, Take 1 Tab by mouth daily. , Disp: 90 Tab, Rfl: 3    metOLazone (ZAROXOLYN) 2.5 mg tablet, Take 1 Tab by mouth as needed. , Disp: 90 Tab, Rfl: 1    lancets 33 gauge misc, Test BS TID/PRN Dx E11.9.  Pt uses one touch delica 33 gauge lancets, Disp: 1 Package, Rfl: 12    Insulin Needles, Disposable, (BD INSULIN PEN NEEDLE UF MINI) 31 gauge x 3/16\" ndle, Use as directed, E11.9, Disp: 100 Pen Needle, Rfl: 12    lancets (ONETOUCH DELICA LANCETS) 30 gauge misc, Testing TID/PRN, E11.9, Disp: 1 Package, Rfl: 12    glucose blood VI test strips (ONETOUCH ULTRA TEST) strip, TEST BS TID AM DX E11.22, Disp: 100 Strip, Rfl: 12    aspirin delayed-release 81 mg tablet, Take 81 mg by mouth daily., Disp: , Rfl:     multivitamin (ONE A DAY) tablet, Take 1 Tab by mouth daily. , Disp: , Rfl:     insulin glargine (LANTUS) 100 unit/mL injection, 16 Units by SubCUTAneous route. As directed , Disp: , Rfl:     ferrous sulfate (IRON) 325 mg (65 mg iron) tablet, Take 65 mg by mouth Daily (before breakfast). , Disp: , Rfl:    Date Last Reviewed:  2/1/2018   Number of Personal Factors/Comorbidities that affect the Plan of Care: 3+: HIGH COMPLEXITY   EXAMINATION:   Observation/Orthostatic Postural Assessment:          Pt is a large man, walks with rollator, sits comfortably on rollator. Has slight difficulty with sit to stand. Sits with poor posture; stands with slight trunk/ hip flexion. Slightly [place]. Palpation:          Some bilat lower leg swelling. Flat area palpable at superior aspect of flexed R knee ( distal quad atrophy?). Varicose veins prominent in R leg. ROM:            PROM Date:   12-5 Date:  1-11 Date:     R hip flex ~ 85 °    90+     R hip abd ~ 25 °     35 °     R hip ER ~ 20 °  ~ 20 °           AROM:  R flex ~ 35 °      R abd ~ 20 °                Strength:           Date:  12-5 Date:  1-11 Date:     R hip flex 3- 3- to 3    R   Hip abd 2- to 2 2+ to 3-    R knee extn 4- 4    R knee flex 3+ to 4- 4    R DF 4           L hip flex 3+ 4    L knee extn 4 4+ to 5    L knee flex 4- 4    L DF 4-         Neurological Screen:        Sensation: Pt denies any sensory changes, denies peripheral neuropathy in LEs  Functional Mobility:         Gait/Ambulation:  ambs with rollator, shuffling gait, slight lean forward. Can walk short distances without walker ( eg, to the toilet). Pt reports that on good days, he can walk as far as about 4-5 blocks. Bed Mobility:  Pt reports that he needs some asst at home to get legs into bed. On the mat in PT, he needed very min asst for R leg. Stairs:  No stairs in the home. Balance:          NT on initial assessment.      Body Structures Involved:  1. Nerves  2. Bones  3. Joints  4. Muscles  5. Ligaments Body Functions Affected:  1. Mental  2. Sensory/Pain  3. Neuromusculoskeletal  4. Movement Related Activities and Participation Affected:  1. General Tasks and Demands  2. Mobility  3. Self Care  4. Domestic Life  5. Interpersonal Interactions and Relationships  6. Community, Social and [place]   Number of elements (examined above) that affect the Plan of Care: 3: MODERATE COMPLEXITY   CLINICAL PRESENTATION:   Presentation: Evolving clinical presentation with changing clinical characteristics: MODERATE COMPLEXITY   CLINICAL DECISION MAKING:   Outcome Measure: Tool Used: Lower Extremity Functional Scale (LEFS)  Score:  Initial: 25/80 Most Recent: 37/80 (Date: 1-11 )   Interpretation of Score: 20 questions each scored on a 5 point scale with 0 representing \"extreme difficulty or unable to perform\" and 4 representing \"no difficulty\". The lower the score, the greater the functional disability. 80/80 represents no disability. Minimal detectable change is 9 points. Score 80 79-63 62-48 47-32 31-16 15-1 0   Modifier CH CI CJ CK CL CM CN     ? Mobility - Walking and Moving Around:     - CURRENT STATUS: CK - 40%-59% impaired, limited or restricted    - GOAL STATUS: CK - 40%-59% impaired, limited or restricted    - D/C STATUS:  ---------------To be determined---------------      Medical Necessity:   · Skilled intervention continues to be required due to lack of hip ROM and strength, which severely impairs function. Reason for Services/Other Comments:  · Patient continues to demonstrate capacity to improve flexibility, ROM and function which will increase independence.    Use of outcome tool(s) and clinical judgement create a POC that gives a: Questionable prediction of patient's progress: MODERATE COMPLEXITY            TREATMENT:   (In addition to Assessment/Re-Assessment sessions the following treatments were rendered)    Pre-treatment Symptoms/Complaints:   Nothing new to report today. Pain: Initial: Pain Intensity 1: 3  Post Session:  1     Therapeutic Exercise   ( 45 minutes ):  To decrease pain, improve flexibility and motion, and increase strength. Will provide verbal and manual cues as needed to ensure proper performance of the exercises. Will increase range of motion, resistance and intensity as pt tolerates. Thorough stretching for hip abd and ER, IR in supine---  Rotation is essentially nil for IR, and severely limited for ER  Active R hip flexion and extn, against manual resistance---1 x 12  R hip flexion from supine with leg off the table--2 x 8  SLR--2 x 12 each leg. Step ups on 6 \" step ---1 x  20  Lateral step downs from 6\"--1 x 15 each leg  Standing hip abd--19#--2  x 12  Standing hip flexion--19#--2 x 12  Isometrics for stabilization against manual resistance with jian  Nustep x 8 min, level 4       [facility] Portal  Treatment/Session Assessment:  Pt tolerated treatment with no discomfort, although he did feel that his blood sugar was low--he tested it and it was --he ate a tangerine. He continues to ambulate without any type of assistive device in the clinic ( uses rollator or st cane otherwise) and seems very pleased. With cues, gait can be better--otherwise pt has excess lateral shifting and little to no rotation. Patient still has not tried driving although this has been a consistently stated goal for him. He remains well motivated. Good compliance with home exercise. Started to discuss weaning from therapy, and transition to more independent exercise program.  Pt was quite distressed to think about stopping PT.        · Response to Treatment:    Pt seemed to enjoy some of the stretching. But not making significant gains is AROM. Elina Gomez · Compliance with Program/Exercises:     seems to be compliant; His goal is that he can return to driving, and walking better with less pain.   · Recommendations/Intent for next treatment session:   Next visit will focus on improving passive and active R hip motion and strength, to allow better function. .  Total Treatment Duration: 45min  Total number of treatments:   15  PT Patient Time In/Time Out  Time In: 0200  Time Out: [address], PT

## 2018-02-05 ENCOUNTER — HOSPITAL ENCOUNTER (OUTPATIENT)
Dept: PHYSICAL THERAPY | Age: 83
Discharge: HOME | End: 2018-02-05
Payer: MEDICARE

## 2018-02-05 PROCEDURE — 97110 THERAPEUTIC EXERCISES: CPT

## 2018-02-05 NOTE — PROGRESS NOTES
Rozina Rasmussen  : 1935  [place] / [place] / Product Type: Medicare /  [address]  at [place] [address] Therapy  [address], [address]  [phone]   [phone]        OUTPATIENT PHYSICAL THERAPY:  Daily Note   2018    ICD-10: Treatment Diagnosis: M25.551  Pain in R hip  M25.561  Stiffness in R hip  R26.2  Difficulty walking  Precautions/Allergies:   Latex   Fall Risk Score: 2 (? 5 = High Risk)  MD Orders: eval and treat MEDICAL/REFERRING DIAGNOSIS:  Unsteadiness on feet [R26.81]   DATE OF ONSET: about 3 months ago  REFERRING PHYSICIAN: Efrain Acharya MD  RETURN PHYSICIAN APPOINTMENT: TBD      ASSESSMENT:  Mr. Rakesh Bates presents to PT with referral for treatment in the pool due to severe arthritis of the R hip, as well as RLE weakness and difficulty walking. He has recently had home health PT to help with mobility. He has multiple medical co-morbidities, including difficulty with stairs that may make getting into and out of our pool very difficult. We discussed an initial plan of PT in the gym, to help improve flexibility and general strength, then probable transition to the pool. He seems well motivated; wife is supportive. I expect progress to be slow due to multiple medical problems and decreased tolerance for exercise. Pt has attended 9 visits to PT, for treatment of R hip and knee pain. He is making steady progress. He is doing more walking without his walker, and reports that getting into and out of the bed and the car are noticeably easier now. He still demonstrates r hip weakness and gait is still wide-based and waddling, but he appears steady. His active range is improving as well. He remains motivated and eager to continue with PT to improve mobility, decrease pain, and hopefully be able to return to driving. PROBLEM LIST (Impacting functional limitations):  1. Decreased Strength  2.  Decreased ADL/Functional Activities  3. Decreased Ambulation Ability/Technique  4. Decreased Balance  5. Increased Pain  6. Decreased Activity Tolerance  7. Decreased Flexibility/Joint Mobility  8. Decreased [place] with Home Exercise Program INTERVENTIONS PLANNED:  1. Balance Exercise  2. Bed Mobility  3. Gait Training  4. Home Exercise Program (HEP)  5. Range of Motion (ROM)  6. Therapeutic Exercise/Strengthening   7. Aquatics   TREATMENT PLAN:  Effective Dates: 12-5-2017 TO 3-1-2018. Frequency/Duration: 2 times a week for 8 weeks, and upon reassessment will adjust frequency and duration as progress indicates. GOALS: (Goals have been discussed and agreed upon with patient.)  Short-Term Functional Goals: Time Frame: 4 weeks  1. Improve R hip PROM to 90 + ° for ease in sitting and rising-- goal met for passive motion, not actively  2. Improve R hip strength to allow 1 x 10 SLR-- goal met  3. Improve ease of moving to allow pt to get in and out of bed without asst-- goal met  Discharge Goals: Time Frame: 12 weeks  1. Pt able to don socks and shoes with no more than set up asst-- not yet met  2. Improve strength and stability BLEs to allow reciprocal stair ascending-- on going  3. Pt to tolerate 30+ min exercise in the pool without increased sx-- not assessed--pt prefers PT in the gym  4. Pt to be independent in basic HEP for ROM and strengthening ex  5. Rehabilitation Potential For Stated Goals: [id number] [facility] therapy, I certify that the treatment plan above will be carried out by a therapist or under their direction. Signed By: Kel Heredia PT     February 5, 2018                       The information in this section was collected on 12-5 (except where otherwise noted). 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   His pain and weakness got much worse suddenly after MRI--he blames it on having to lie on the hard table for so long. Since that time about 2 months ago, his pain is worse and weakness is worse as well. Past Medical History/Comorbidities:   Mr. Ranjit Torres  has a past medical history of Arthritis; Atrial fibrillation ([address] CAD (coronary artery disease); Cancer ([address] DM (diabetes mellitus), type 2 ([address] Family history of prostate problems; GERD (gastroesophageal reflux disease); Hearing difficulty; HLD (hyperlipidemia); Hypertension; Hypothyroidism; Insulin dependent diabetes mellitus ([address] Kidney problem; and Systolic heart failure ([address] Mr. Ranjit Torres  has a past surgical history that includes hx coronary artery bypass graft; hx other surgical; hx other surgical; hx cataract removal (04/2011); hx tonsillectomy; hx hernia repair (08/2004); hx appendectomy (08/2004); hx prostatectomy (03/01/2006); pr cardiac surg procedure unlist (06/2007); hx aortic valve replacement (11/10/2011); pr cardiac surg procedure unlist (11/14/2011); pr cardiac surg procedure unlist (05/10/2013); hx orthopaedic (1973); and hx knee replacement (04/25/2016). R TKA 2015. CABG in 2007. Pacemaker and defibrillator in 2015, due to silent MI. Pt reports that he is in Afib all the time. Has IDDM, aortic calcification, HTN. Social History/Living Environment:     . Lives with wife in 1 level house. Has only  Lived in [place] 1 yr, moved from [place]  Prior Level of Function/Work/Activity:  Had been more mobile, with less pain prior to this episode of hip pain. Retired. Dominant Side:         RIGHT  Personal Factors:          Age:  81 y.o. Current Medications:       Current Outpatient Prescriptions:     furosemide (LASIX) 40 mg tablet, TAKE 1 TABLET BY MOUTH TWO  TIMES DAILY, Disp: 180 Tab, Rfl: 3    carvedilol (COREG) 6.25 mg tablet, TAKE 1 TABLET BY MOUTH TWO  TIMES DAILY WITH MEALS, Disp: 180 Tab, Rfl: 3    potassium chloride (K-DUR, KLOR-CON) 20 mEq tablet, Take 1 Tab by mouth daily. , Disp: 90 Tab, Rfl: 3    tamsulosin (FLOMAX) 0.4 mg capsule, Take 1 Cap by mouth daily. , Disp: 90 Cap, Rfl: 3    traMADol-acetaminophen (ULTRACET) 37.5-325 mg per tablet, Take 1 Tab by mouth every six (6) hours as needed for Pain. Max Daily Amount: 4 Tabs., Disp: 30 Tab, Rfl: 2    GLUC/CHND/OM3/DHA/EPA/FISH/STR (GLUCOSAMINE CHONDROITIN PLUS PO), Take 2 Caplet by mouth daily. , Disp: , Rfl:     omeprazole (PRILOSEC) 20 mg capsule, Take 1 capsule by mouth  daily, Disp: 90 Cap, Rfl: 3    simvastatin (ZOCOR) 20 mg tablet, Take 1 tablet by mouth  nightly, Disp: 90 Tab, Rfl: 3    warfarin (COUMADIN) 5 mg tablet, 1 to 1&1/2 tabs every day or as directed (Patient taking differently: Take 5 mg by mouth daily. 1 to 1&1/2 tabs every day or as directed  Pt states he take 7.5mg Monday and Friday and 5mg on all other days), Disp: 120 Tab, Rfl: 3    insulin aspart (NOVOLOG) 100 unit/mL injection, by SubCUTAneous route as needed. 6 am, lunch 4 ; 6 supper-plus few units by scale if high, Disp: , Rfl:     clobetasol (TEMOVATE) 0.05 % ointment, Apply  to affected area two (2) times a day., Disp: 15 g, Rfl: 12    levothyroxine (SYNTHROID) 125 mcg tablet, Take 1 Tab by mouth Daily (before breakfast). , Disp: 90 Tab, Rfl: 3    lisinopril (ZESTRIL) 2.5 mg tablet, Take 1 Tab by mouth daily. , Disp: 90 Tab, Rfl: 3    metOLazone (ZAROXOLYN) 2.5 mg tablet, Take 1 Tab by mouth as needed. , Disp: 90 Tab, Rfl: 1    lancets 33 gauge misc, Test BS TID/PRN Dx E11.9.  Pt uses one touch delica 33 gauge lancets, Disp: 1 Package, Rfl: 12    Insulin Needles, Disposable, (BD INSULIN PEN NEEDLE UF MINI) 31 gauge x 3/16\" ndle, Use as directed, E11.9, Disp: 100 Pen Needle, Rfl: 12    lancets (ONETOUCH DELICA LANCETS) 30 gauge misc, Testing TID/PRN, E11.9, Disp: 1 Package, Rfl: 12    glucose blood VI test strips (ONETOUCH ULTRA TEST) strip, TEST BS TID AM DX E11.22, Disp: 100 Strip, Rfl: 12    aspirin delayed-release 81 mg tablet, Take 81 mg by mouth daily., Disp: , Rfl:     multivitamin (ONE A DAY) tablet, Take 1 Tab by mouth daily. , Disp: , Rfl:     insulin glargine (LANTUS) 100 unit/mL injection, 16 Units by SubCUTAneous route. As directed , Disp: , Rfl:     ferrous sulfate (IRON) 325 mg (65 mg iron) tablet, Take 65 mg by mouth Daily (before breakfast). , Disp: , Rfl:    Date Last Reviewed:  2/5/2018   Number of Personal Factors/Comorbidities that affect the Plan of Care: 3+: HIGH COMPLEXITY   EXAMINATION:   Observation/Orthostatic Postural Assessment:          Pt is a large man, walks with rollator, sits comfortably on rollator. Has slight difficulty with sit to stand. Sits with poor posture; stands with slight trunk/ hip flexion. Slightly [place]. Palpation:          Some bilat lower leg swelling. Flat area palpable at superior aspect of flexed R knee ( distal quad atrophy?). Varicose veins prominent in R leg. ROM:            PROM Date:   12-5 Date:  1-11 Date:     R hip flex ~ 85 °    90+     R hip abd ~ 25 °     35 °     R hip ER ~ 20 °  ~ 20 °           AROM:  R flex ~ 35 °      R abd ~ 20 °                Strength:           Date:  12-5 Date:  1-11 Date:     R hip flex 3- 3- to 3    R   Hip abd 2- to 2 2+ to 3-    R knee extn 4- 4    R knee flex 3+ to 4- 4    R DF 4           L hip flex 3+ 4    L knee extn 4 4+ to 5    L knee flex 4- 4    L DF 4-         Neurological Screen:        Sensation: Pt denies any sensory changes, denies peripheral neuropathy in LEs  Functional Mobility:         Gait/Ambulation:  ambs with rollator, shuffling gait, slight lean forward. Can walk short distances without walker ( eg, to the toilet). Pt reports that on good days, he can walk as far as about 4-5 blocks. Bed Mobility:  Pt reports that he needs some asst at home to get legs into bed. On the mat in PT, he needed very min asst for R leg. Stairs:  No stairs in the home. Balance:          NT on initial assessment.      Body Structures Involved:  1. Nerves  2. Bones  3. Joints  4. Muscles  5. Ligaments Body Functions Affected:  1. Mental  2. Sensory/Pain  3. Neuromusculoskeletal  4. Movement Related Activities and Participation Affected:  1. General Tasks and Demands  2. Mobility  3. Self Care  4. Domestic Life  5. Interpersonal Interactions and Relationships  6. Community, Social and [place]   Number of elements (examined above) that affect the Plan of Care: 3: MODERATE COMPLEXITY   CLINICAL PRESENTATION:   Presentation: Evolving clinical presentation with changing clinical characteristics: MODERATE COMPLEXITY   CLINICAL DECISION MAKING:   Outcome Measure: Tool Used: Lower Extremity Functional Scale (LEFS)  Score:  Initial: 25/80 Most Recent: 37/80 (Date: 1-11 )   Interpretation of Score: 20 questions each scored on a 5 point scale with 0 representing \"extreme difficulty or unable to perform\" and 4 representing \"no difficulty\". The lower the score, the greater the functional disability. 80/80 represents no disability. Minimal detectable change is 9 points. Score 80 79-63 62-48 47-32 31-16 15-1 0   Modifier CH CI CJ CK CL CM CN     ? Mobility - Walking and Moving Around:     - CURRENT STATUS: CK - 40%-59% impaired, limited or restricted    - GOAL STATUS: CK - 40%-59% impaired, limited or restricted    - D/C STATUS:  ---------------To be determined---------------      Medical Necessity:   · Skilled intervention continues to be required due to lack of hip ROM and strength, which severely impairs function. Reason for Services/Other Comments:  · Patient continues to demonstrate capacity to improve flexibility, ROM and function which will increase independence.    Use of outcome tool(s) and clinical judgement create a POC that gives a: Questionable prediction of patient's progress: MODERATE COMPLEXITY            TREATMENT:   (In addition to Assessment/Re-Assessment sessions the following treatments were rendered)    Pre-treatment Symptoms/Complaints:   Pt reports that his R hip is much more sore than it has been. He almost did not come to therapy today. Pain: Initial: Pain Intensity 1: 4  Post Session:  2     Therapeutic Exercise   ( 45 minutes ):  To decrease pain, improve flexibility and motion, and increase strength. Will provide verbal and manual cues as needed to ensure proper performance of the exercises. Will increase range of motion, resistance and intensity as pt tolerates. Nustep x 8 min, level 4  Thorough stretching for hip abd, SLR, and ER, IR in supine---  Rotation is essentially nil for IR, and severely limited for ER  Active R hip flexion and extn, against manual resistance---1 x 12  SLR--1 x 12 each leg.  ---he had lots of difficulty today initiating SLR with R--had to work eccentrically with therapist several times before he could raise leg independently   Step ups on 6 \" step ---1 x  20  Lateral step downs from 6\"--1 x 15 each leg  Standing hip abd--19#--2  x 12  Standing hip flexion--19#--2 x 12      [facility] Portal  Treatment/Session Assessment:  Pt tolerated treatment with no discomfort. He continues to ambulate without any type of assistive device in the clinic ( uses rollator or st cane otherwise) and seems very pleased. With cues, gait can be better--otherwise pt has excess lateral shifting and little to no rotation. Patient has tried driving  And apparently did fine with this. That is a big accomplishment for him. He remains well motivated. Some compliance with home exercise; wife pushes him to walk more. Started to discuss weaning from therapy, and transition to more independent exercise program.  Will try exercise in the pool before DC. · Response to Treatment:    Pt seemed to enjoy some of the stretching. But not making significant gains is AROM. Earline Elias · Compliance with Program/Exercises:     seems to be compliant;   His goal is that he can return to driving, and walking better with less pain.  · Recommendations/Intent for next treatment session:   Next visit will focus on improving passive and active R hip motion and strength, to allow better function. .  We plan to do next appt in the pool  Total Treatment Duration: 45min  Total number of treatments:   16  PT Patient Time In/Time Out  Time In: 0215  Time Out: One Rosa Betancourt, JESSICA

## 2018-02-08 ENCOUNTER — HOSPITAL ENCOUNTER (OUTPATIENT)
Dept: PHYSICAL THERAPY | Age: 83
Discharge: HOME | End: 2018-02-08
Payer: MEDICARE

## 2018-02-08 PROCEDURE — 97113 AQUATIC THERAPY/EXERCISES: CPT

## 2018-02-08 NOTE — PROGRESS NOTES
Jas Foot  : 1935  [place] / [place] / Product Type: Medicare /  [address]  at [place] [address] Therapy  [address], [address]  [phone]   [phone]        OUTPATIENT PHYSICAL THERAPY:  Daily Note   2018    ICD-10: Treatment Diagnosis: M25.551  Pain in R hip  M25.561  Stiffness in R hip  R26.2  Difficulty walking  Precautions/Allergies:   Latex   Fall Risk Score: 2 (? 5 = High Risk)  MD Orders: eval and treat MEDICAL/REFERRING DIAGNOSIS:  Unsteadiness on feet [R26.81]   DATE OF ONSET: about 3 months ago  REFERRING PHYSICIAN: Vickie Hadley MD  RETURN PHYSICIAN APPOINTMENT: TBD      ASSESSMENT:  Mr. Maricel Arciniega presents to PT with referral for treatment in the pool due to severe arthritis of the R hip, as well as RLE weakness and difficulty walking. He has recently had home health PT to help with mobility. He has multiple medical co-morbidities, including difficulty with stairs that may make getting into and out of our pool very difficult. We discussed an initial plan of PT in the gym, to help improve flexibility and general strength, then probable transition to the pool. He seems well motivated; wife is supportive. I expect progress to be slow due to multiple medical problems and decreased tolerance for exercise. Pt has attended 9 visits to PT, for treatment of R hip and knee pain. He is making steady progress. He is doing more walking without his walker, and reports that getting into and out of the bed and the car are noticeably easier now. He still demonstrates r hip weakness and gait is still wide-based and waddling, but he appears steady. His active range is improving as well. He remains motivated and eager to continue with PT to improve mobility, decrease pain, and hopefully be able to return to driving. PROBLEM LIST (Impacting functional limitations):  1. Decreased Strength  2.  Decreased ADL/Functional Activities  3. Decreased Ambulation Ability/Technique  4. Decreased Balance  5. Increased Pain  6. Decreased Activity Tolerance  7. Decreased Flexibility/Joint Mobility  8. Decreased [place] with Home Exercise Program INTERVENTIONS PLANNED:  1. Balance Exercise  2. Bed Mobility  3. Gait Training  4. Home Exercise Program (HEP)  5. Range of Motion (ROM)  6. Therapeutic Exercise/Strengthening   7. Aquatics   TREATMENT PLAN:  Effective Dates: 12-5-2017 TO 3-1-2018. Frequency/Duration: 2 times a week for 8 weeks, and upon reassessment will adjust frequency and duration as progress indicates. GOALS: (Goals have been discussed and agreed upon with patient.)  Short-Term Functional Goals: Time Frame: 4 weeks  1. Improve R hip PROM to 90 + ° for ease in sitting and rising-- goal met for passive motion, not actively  2. Improve R hip strength to allow 1 x 10 SLR-- goal met  3. Improve ease of moving to allow pt to get in and out of bed without asst-- goal met  Discharge Goals: Time Frame: 12 weeks  1. Pt able to don socks and shoes with no more than set up asst-- not yet met  2. Improve strength and stability BLEs to allow reciprocal stair ascending-- on going  3. Pt to tolerate 30+ min exercise in the pool without increased sx-- not assessed--pt prefers PT in the gym  4. Pt to be independent in basic HEP for ROM and strengthening ex  5. Rehabilitation Potential For Stated Goals: [id number] [facility] therapy, I certify that the treatment plan above will be carried out by a therapist or under their direction. Signed By: Vj Valencia, PT     February 8, 2018                       The information in this section was collected on 12-5 (except where otherwise noted). 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   His pain and weakness got much worse suddenly after MRI--he blames it on having to lie on the hard table for so long. Since that time about 2 months ago, his pain is worse and weakness is worse as well. Past Medical History/Comorbidities:   Mr. Jessica Dietrich  has a past medical history of Arthritis; Atrial fibrillation ([facility] [address] CAD (coronary artery disease); Cancer ([facility] [address] DM (diabetes mellitus), type 2 ([facility] [address] Family history of prostate problems; GERD (gastroesophageal reflux disease); Hearing difficulty; HLD (hyperlipidemia); Hypertension; Hypothyroidism; Insulin dependent diabetes mellitus ([facility] [address] Kidney problem; and Systolic heart failure ([facility] [address] Mr. Jessica Dietrich  has a past surgical history that includes hx coronary artery bypass graft; hx other surgical; hx other surgical; hx cataract removal (04/2011); hx tonsillectomy; hx hernia repair (08/2004); hx appendectomy (08/2004); hx prostatectomy (03/01/2006); pr cardiac surg procedure unlist (06/2007); hx aortic valve replacement (11/10/2011); pr cardiac surg procedure unlist (11/14/2011); pr cardiac surg procedure unlist (05/10/2013); hx orthopaedic (1973); and hx knee replacement (04/25/2016). R TKA 2015. CABG in 2007. Pacemaker and defibrillator in 2015, due to silent MI. Pt reports that he is in Afib all the time. Has IDDM, aortic calcification, HTN. Social History/Living Environment:     . Lives with wife in 1 level house. Has only  Lived in [place] 1 yr, moved from [place]  Prior Level of Function/Work/Activity:  Had been more mobile, with less pain prior to this episode of hip pain. Retired. Dominant Side:         RIGHT  Personal Factors:          Age:  81 y.o. Current Medications:       Current Outpatient Prescriptions:     furosemide (LASIX) 40 mg tablet, TAKE 1 TABLET BY MOUTH TWO  TIMES DAILY, Disp: 180 Tab, Rfl: 3    carvedilol (COREG) 6.25 mg tablet, TAKE 1 TABLET BY MOUTH TWO  TIMES DAILY WITH MEALS, Disp: 180 Tab, Rfl: 3    potassium chloride (K-DUR, KLOR-CON) 20 mEq tablet, Take 1 Tab by mouth daily. , Disp: 90 Tab, Rfl: 3    tamsulosin (FLOMAX) 0.4 mg capsule, Take 1 Cap by mouth daily. , Disp: 90 Cap, Rfl: 3    traMADol-acetaminophen (ULTRACET) 37.5-325 mg per tablet, Take 1 Tab by mouth every six (6) hours as needed for Pain. Max Daily Amount: 4 Tabs., Disp: 30 Tab, Rfl: 2    GLUC/CHND/OM3/DHA/EPA/FISH/STR (GLUCOSAMINE CHONDROITIN PLUS PO), Take 2 Caplet by mouth daily. , Disp: , Rfl:     omeprazole (PRILOSEC) 20 mg capsule, Take 1 capsule by mouth  daily, Disp: 90 Cap, Rfl: 3    simvastatin (ZOCOR) 20 mg tablet, Take 1 tablet by mouth  nightly, Disp: 90 Tab, Rfl: 3    warfarin (COUMADIN) 5 mg tablet, 1 to 1&1/2 tabs every day or as directed (Patient taking differently: Take 5 mg by mouth daily. 1 to 1&1/2 tabs every day or as directed  Pt states he take 7.5mg Monday and Friday and 5mg on all other days), Disp: 120 Tab, Rfl: 3    insulin aspart (NOVOLOG) 100 unit/mL injection, by SubCUTAneous route as needed. 6 am, lunch 4 ; 6 supper-plus few units by scale if high, Disp: , Rfl:     clobetasol (TEMOVATE) 0.05 % ointment, Apply  to affected area two (2) times a day., Disp: 15 g, Rfl: 12    levothyroxine (SYNTHROID) 125 mcg tablet, Take 1 Tab by mouth Daily (before breakfast). , Disp: 90 Tab, Rfl: 3    lisinopril (ZESTRIL) 2.5 mg tablet, Take 1 Tab by mouth daily. , Disp: 90 Tab, Rfl: 3    metOLazone (ZAROXOLYN) 2.5 mg tablet, Take 1 Tab by mouth as needed. , Disp: 90 Tab, Rfl: 1    lancets 33 gauge misc, Test BS TID/PRN Dx E11.9.  Pt uses one touch delica 33 gauge lancets, Disp: 1 Package, Rfl: 12    Insulin Needles, Disposable, (BD INSULIN PEN NEEDLE UF MINI) 31 gauge x 3/16\" ndle, Use as directed, E11.9, Disp: 100 Pen Needle, Rfl: 12    lancets (ONETOUCH DELICA LANCETS) 30 gauge misc, Testing TID/PRN, E11.9, Disp: 1 Package, Rfl: 12    glucose blood VI test strips (ONETOUCH ULTRA TEST) strip, TEST BS TID AM DX E11.22, Disp: 100 Strip, Rfl: 12    aspirin delayed-release 81 mg tablet, Take 81 mg by mouth daily., Disp: , Rfl:     multivitamin (ONE A DAY) tablet, Take 1 Tab by mouth daily. , Disp: , Rfl:     insulin glargine (LANTUS) 100 unit/mL injection, 16 Units by SubCUTAneous route. As directed , Disp: , Rfl:     ferrous sulfate (IRON) 325 mg (65 mg iron) tablet, Take 65 mg by mouth Daily (before breakfast). , Disp: , Rfl:    Date Last Reviewed:  2/8/2018   Number of Personal Factors/Comorbidities that affect the Plan of Care: 3+: HIGH COMPLEXITY   EXAMINATION:   Observation/Orthostatic Postural Assessment:          Pt is a large man, walks with rollator, sits comfortably on rollator. Has slight difficulty with sit to stand. Sits with poor posture; stands with slight trunk/ hip flexion. Slightly [place]. Palpation:          Some bilat lower leg swelling. Flat area palpable at superior aspect of flexed R knee ( distal quad atrophy?). Varicose veins prominent in R leg. ROM:            PROM Date:   12-5 Date:  1-11 Date:     R hip flex ~ 85 °    90+     R hip abd ~ 25 °     35 °     R hip ER ~ 20 °  ~ 20 °           AROM:  R flex ~ 35 °      R abd ~ 20 °                Strength:           Date:  12-5 Date:  1-11 Date:     R hip flex 3- 3- to 3    R   Hip abd 2- to 2 2+ to 3-    R knee extn 4- 4    R knee flex 3+ to 4- 4    R DF 4           L hip flex 3+ 4    L knee extn 4 4+ to 5    L knee flex 4- 4    L DF 4-         Neurological Screen:        Sensation: Pt denies any sensory changes, denies peripheral neuropathy in LEs  Functional Mobility:         Gait/Ambulation:  ambs with rollator, shuffling gait, slight lean forward. Can walk short distances without walker ( eg, to the toilet). Pt reports that on good days, he can walk as far as about 4-5 blocks. Bed Mobility:  Pt reports that he needs some asst at home to get legs into bed. On the mat in PT, he needed very min asst for R leg. Stairs:  No stairs in the home. Balance:          NT on initial assessment.      Body Structures Involved:  1. Nerves  2. Bones  3. Joints  4. Muscles  5. Ligaments Body Functions Affected:  1. Mental  2. Sensory/Pain  3. Neuromusculoskeletal  4. Movement Related Activities and Participation Affected:  1. General Tasks and Demands  2. Mobility  3. Self Care  4. Domestic Life  5. Interpersonal Interactions and Relationships  6. Community, Social and [place]   Number of elements (examined above) that affect the Plan of Care: 3: MODERATE COMPLEXITY   CLINICAL PRESENTATION:   Presentation: Evolving clinical presentation with changing clinical characteristics: MODERATE COMPLEXITY   CLINICAL DECISION MAKING:   Outcome Measure: Tool Used: Lower Extremity Functional Scale (LEFS)  Score:  Initial: 25/80 Most Recent: 37/80 (Date: 1-11 )   Interpretation of Score: 20 questions each scored on a 5 point scale with 0 representing \"extreme difficulty or unable to perform\" and 4 representing \"no difficulty\". The lower the score, the greater the functional disability. 80/80 represents no disability. Minimal detectable change is 9 points. Score 80 79-63 62-48 47-32 31-16 15-1 0   Modifier CH CI CJ CK CL CM CN     ? Mobility - Walking and Moving Around:     - CURRENT STATUS: CK - 40%-59% impaired, limited or restricted    - GOAL STATUS: CK - 40%-59% impaired, limited or restricted    - D/C STATUS:  ---------------To be determined---------------      Medical Necessity:   · Skilled intervention continues to be required due to lack of hip ROM and strength, which severely impairs function. Reason for Services/Other Comments:  · Patient continues to demonstrate capacity to improve flexibility, ROM and function which will increase independence.    Use of outcome tool(s) and clinical judgement create a POC that gives a: Questionable prediction of patient's progress: MODERATE COMPLEXITY            TREATMENT:   (In addition to Assessment/Re-Assessment sessions the following treatments were rendered)    Pre-treatment Symptoms/Complaints:   Nothing new to report today. Pain: Initial: Pain Intensity 1: 3  Post Session:  2     Therapeutic Exercise   ( minutes ):  To decrease pain, improve flexibility and motion, and increase strength. Will provide verbal and manual cues as needed to ensure proper performance of the exercises. Will increase range of motion, resistance and intensity as pt tolerates. Nustep x 8 min, level 4  Thorough stretching for hip abd, SLR, and ER, IR in supine---  Rotation is essentially nil for IR, and severely limited for ER  Active R hip flexion and extn, against manual resistance---1 x 12  SLR--1 x 12 each leg.  ---he had lots of difficulty today initiating SLR with R--had to work eccentrically with therapist several times before he could raise leg independently   Step ups on 6 \" step ---1 x  20  Lateral step downs from 6\"--1 x 15 each leg  Standing hip abd--19#--2  x 12  Standing hip flexion--19#--2 x 12  Aquatic Therapy  ( 45 min): Walking forward, backward--10 trips  Marching--20 reps  Hip abd--30 reps  Step ups with r knee--5-8 reps, with much difficulty  Tried to work on bike or reciprocal LE movements but pt could not use noodle for this  Pt had much difficulty keeping legs donw in the deep well  Needed min asst to get out of the pool        [employer]  Treatment/Session Assessment:  Pt tolerated treatment with no discomfort. I could not tell if he enjoyed ex in the pool; wife reports that he is comfortable in the water. All movements were slow and he was difficult to direct for exercises. He does want to try it again, evene though he complained about the floor being slippery and the slant was too steep and the stairs were difficult. Needed some asst from wife for dressing afterward. He continues to ambulate without any type of assistive device in the clinic ( uses rollator or st cane otherwise) and seems very pleased.  With cues, gait can be better--otherwise pt has excess lateral shifting and little to no rotation. Some compliance with home exercise; wife pushes him to walk more. Started to discuss weaning from therapy, and transition to more independent exercise program.  Will try exercise in the pool again before DC. · Response to Treatment:    He did say that he felt he could move better in the pool. But not making significant gains is AROM. Ledy Dies · Compliance with Program/Exercises:     seems to be compliant; His goal is that he can return to driving, and walking better with less pain. · Recommendations/Intent for next treatment session:   Next visit will focus on improving passive and active R hip motion and strength, to allow better function. .  We plan to do next appt in the pool. starting to plan for DC.     Total Treatment Duration: 45min  Total number of treatments:   17  PT Patient Time In/Time Out  Time In: 0245  Time Out: [address] [facility], [address], PT

## 2018-02-12 ENCOUNTER — HOSPITAL ENCOUNTER (OUTPATIENT)
Dept: PHYSICAL THERAPY | Age: 83
Discharge: HOME | End: 2018-02-12
Payer: MEDICARE

## 2018-02-12 PROCEDURE — 97110 THERAPEUTIC EXERCISES: CPT

## 2018-02-12 PROCEDURE — G8978 MOBILITY CURRENT STATUS: HCPCS

## 2018-02-12 PROCEDURE — G8979 MOBILITY GOAL STATUS: HCPCS

## 2018-02-12 NOTE — PROGRESS NOTES
Humza Nguyễn  : 1935  [place] / [place] / Product Type: Medicare /  [address]  at [facility]  [address], [address]  [phone]   [phone]        OUTPATIENT PHYSICAL THERAPY:  Daily Note and Progress   2018    ICD-10: Treatment Diagnosis: M25.551  Pain in R hip  M25.561  Stiffness in R hip  R26.2  Difficulty walking  Precautions/Allergies:   Latex   Fall Risk Score: 2 (? 5 = High Risk)  MD Orders: eval and treat MEDICAL/REFERRING DIAGNOSIS:  Unsteadiness on feet [R26.81]   DATE OF ONSET: about 3 months ago  REFERRING PHYSICIAN: Gianni Quezada MD  RETURN PHYSICIAN APPOINTMENT: TBD      ASSESSMENT:  Mr. Harrison Menard presents to PT with referral for treatment in the pool due to severe arthritis of the R hip, as well as RLE weakness and difficulty walking. He has recently had home health PT to help with mobility. He has multiple medical co-morbidities, including difficulty with stairs that may make getting into and out of our pool very difficult. We discussed an initial plan of PT in the gym, to help improve flexibility and general strength, then probable transition to the pool. He seems well motivated; wife is supportive. I expect progress to be slow due to multiple medical problems and decreased tolerance for exercise. Pt has attended 18 visits to PT, for treatment of R hip and knee pain. He is making steady progress. He is doing more walking without his walker, and reports that getting into and out of the bed and the car are noticeably easier now. He has driven once or twice, which was one of his goals. He still demonstrates R hip weakness and stiffness, and gait is still wide-based and waddling, but he appears steady. He often uses st cane for amb, when he does not feel the need to use his walker. His active range is improving as well.   WE are weaning PT to 1 x per week for 1-2 weeks, then plan for DC. Did try 1 PT visit for therapy in the pool, but he did not care for this. PROBLEM LIST (Impacting functional limitations):  1. Decreased Strength  2. Decreased ADL/Functional Activities  3. Decreased Ambulation Ability/Technique  4. Decreased Balance  5. Increased Pain  6. Decreased Activity Tolerance  7. Decreased Flexibility/Joint Mobility  8. Decreased [place] with Home Exercise Program INTERVENTIONS PLANNED:  1. Balance Exercise  2. Bed Mobility  3. Gait Training  4. Home Exercise Program (HEP)  5. Range of Motion (ROM)  6. Therapeutic Exercise/Strengthening   7. Aquatics   TREATMENT PLAN:  Effective Dates: 12-5-2017 TO 3-1-2018. Frequency/Duration: 2 times a week for 8 weeks, and upon reassessment will adjust frequency and duration as progress indicates. GOALS: (Goals have been discussed and agreed upon with patient.)  Short-Term Functional Goals: Time Frame: 4 weeks  1. Improve R hip PROM to 90 + ° for ease in sitting and rising-- goal met for passive motion, not actively  2. Improve R hip strength to allow 1 x 10 SLR-- goal met  3. Improve ease of moving to allow pt to get in and out of bed without asst-- goal met  Discharge Goals: Time Frame: 12 weeks  1. Pt able to don socks and shoes with no more than set up asst-- not yet met  2. Improve strength and stability BLEs to allow reciprocal stair ascending-- on going  3. Pt to tolerate 30+ min exercise in the pool without increased sx-- pt did 1 PT appt in the pool but did not care for it. 4. Pt to be independent in basic HEP for ROM and strengthening ex  5. Rehabilitation Potential For Stated Goals: [id number] [facility] therapy, I certify that the treatment plan above will be carried out by a therapist or under their direction. Signed By: Luis Menendez, PT     February 12, 2018                       The information in this section was collected on 12-5 (except where otherwise noted).   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 His pain and weakness got much worse suddenly after MRI--he blames it on having to lie on the hard table for so long. Since that time about 2 months ago, his pain is worse and weakness is worse as well. Past Medical History/Comorbidities:   Mr. Leah Haddad  has a past medical history of Arthritis; Atrial fibrillation ([address] CAD (coronary artery disease); Cancer ([address] DM (diabetes mellitus), type 2 ([address] Family history of prostate problems; GERD (gastroesophageal reflux disease); Hearing difficulty; HLD (hyperlipidemia); Hypertension; Hypothyroidism; Insulin dependent diabetes mellitus ([address] Kidney problem; and Systolic heart failure ([address] Mr. Leah Haddad  has a past surgical history that includes hx coronary artery bypass graft; hx other surgical; hx other surgical; hx cataract removal (04/2011); hx tonsillectomy; hx hernia repair (08/2004); hx appendectomy (08/2004); hx prostatectomy (03/01/2006); pr cardiac surg procedure unlist (06/2007); hx aortic valve replacement (11/10/2011); pr cardiac surg procedure unlist (11/14/2011); pr cardiac surg procedure unlist (05/10/2013); hx orthopaedic (1973); and hx knee replacement (04/25/2016). R TKA 2015. CABG in 2007. Pacemaker and defibrillator in 2015, due to silent MI. Pt reports that he is in Afib all the time. Has IDDM, aortic calcification, HTN. Social History/Living Environment:     . Lives with wife in 1 level house. Has only  Lived in [place] 1 yr, moved from [place]  Prior Level of Function/Work/Activity:  Had been more mobile, with less pain prior to this episode of hip pain. Retired. Dominant Side:         RIGHT  Personal Factors:          Age:  81 y.o.   Current Medications:       Current Outpatient Prescriptions:     furosemide (LASIX) 40 mg tablet, TAKE 1 TABLET BY MOUTH TWO  TIMES DAILY, Disp: 180 Tab, Rfl: 3   carvedilol (COREG) 6.25 mg tablet, TAKE 1 TABLET BY MOUTH TWO  TIMES DAILY WITH MEALS, Disp: 180 Tab, Rfl: 3    potassium chloride (K-DUR, KLOR-CON) 20 mEq tablet, Take 1 Tab by mouth daily. , Disp: 90 Tab, Rfl: 3    tamsulosin (FLOMAX) 0.4 mg capsule, Take 1 Cap by mouth daily. , Disp: 90 Cap, Rfl: 3    traMADol-acetaminophen (ULTRACET) 37.5-325 mg per tablet, Take 1 Tab by mouth every six (6) hours as needed for Pain. Max Daily Amount: 4 Tabs., Disp: 30 Tab, Rfl: 2    GLUC/CHND/OM3/DHA/EPA/FISH/STR (GLUCOSAMINE CHONDROITIN PLUS PO), Take 2 Caplet by mouth daily. , Disp: , Rfl:     omeprazole (PRILOSEC) 20 mg capsule, Take 1 capsule by mouth  daily, Disp: 90 Cap, Rfl: 3    simvastatin (ZOCOR) 20 mg tablet, Take 1 tablet by mouth  nightly, Disp: 90 Tab, Rfl: 3    warfarin (COUMADIN) 5 mg tablet, 1 to 1&1/2 tabs every day or as directed (Patient taking differently: Take 5 mg by mouth daily. 1 to 1&1/2 tabs every day or as directed  Pt states he take 7.5mg Monday and Friday and 5mg on all other days), Disp: 120 Tab, Rfl: 3    insulin aspart (NOVOLOG) 100 unit/mL injection, by SubCUTAneous route as needed. 6 am, lunch 4 ; 6 supper-plus few units by scale if high, Disp: , Rfl:     clobetasol (TEMOVATE) 0.05 % ointment, Apply  to affected area two (2) times a day., Disp: 15 g, Rfl: 12    levothyroxine (SYNTHROID) 125 mcg tablet, Take 1 Tab by mouth Daily (before breakfast). , Disp: 90 Tab, Rfl: 3    lisinopril (ZESTRIL) 2.5 mg tablet, Take 1 Tab by mouth daily. , Disp: 90 Tab, Rfl: 3    metOLazone (ZAROXOLYN) 2.5 mg tablet, Take 1 Tab by mouth as needed. , Disp: 90 Tab, Rfl: 1    lancets 33 gauge misc, Test BS TID/PRN Dx E11.9.  Pt uses one touch delica 33 gauge lancets, Disp: 1 Package, Rfl: 12    Insulin Needles, Disposable, (BD INSULIN PEN NEEDLE UF MINI) 31 gauge x 3/16\" ndle, Use as directed, E11.9, Disp: 100 Pen Needle, Rfl: 12    lancets (ONETOUCH DELICA LANCETS) 30 gauge misc, Testing TID/PRN, E11.9, Disp: 1 Package, Rfl: 12    glucose blood VI test strips (ONETOUCH ULTRA TEST) strip, TEST BS TID AM DX E11.22, Disp: 100 Strip, Rfl: 12    aspirin delayed-release 81 mg tablet, Take 81 mg by mouth daily. , Disp: , Rfl:     multivitamin (ONE A DAY) tablet, Take 1 Tab by mouth daily. , Disp: , Rfl:     insulin glargine (LANTUS) 100 unit/mL injection, 16 Units by SubCUTAneous route. As directed , Disp: , Rfl:     ferrous sulfate (IRON) 325 mg (65 mg iron) tablet, Take 65 mg by mouth Daily (before breakfast). , Disp: , Rfl:    Date Last Reviewed:  2/12/2018   Number of Personal Factors/Comorbidities that affect the Plan of Care: 3+: HIGH COMPLEXITY   EXAMINATION:   Observation/Orthostatic Postural Assessment:          Pt is a large man, walks with rollator, sits comfortably on rollator. Has slight difficulty with sit to stand. Sits with poor posture; stands with slight trunk/ hip flexion. Slightly [place]. Palpation:          Some bilat lower leg swelling. Flat area palpable at superior aspect of flexed R knee ( distal quad atrophy?). Varicose veins prominent in R leg. ROM:            PROM Date:   12-5 Date:  1-11 Date:  2-12   R hip flex ~ 85 °    90+  90 + °    R hip abd ~ 25 °     35 °  35 °    R hip ER ~ 20 °  ~ 20 °  ~ 20-25 °          AROM:  R flex ~ 35 °   85 -90 °    R abd ~ 20 °   ~ 25-30 °              Strength:           Date:  12-5 Date:  1-11 Date:  2-12   R hip flex 3- 3- to 3 3+   R   Hip abd 2- to 2 2+ to 3- 3 to 3+    R knee extn 4- 4 4   R knee flex 3+ to 4- 4 4+    R DF 4           L hip flex 3+ 4    L knee extn 4 4+ to 5    L knee flex 4- 4    L DF 4-         Neurological Screen:        Sensation: Pt denies any sensory changes, denies peripheral neuropathy in LEs  Functional Mobility:         Gait/Ambulation:  ambs with rollator, shuffling gait, slight lean forward. Can walk short distances without walker ( eg, to the toilet).   Pt reports that on good days, he can walk as far as about 4-5 blocks. Bed Mobility:  Pt reports that he needs some asst at home to get legs into bed. On the mat in PT, he needed very min asst for R leg. Stairs:  No stairs in the home. Balance:          NT on initial assessment. Body Structures Involved:  1. Nerves  2. Bones  3. Joints  4. Muscles  5. Ligaments Body Functions Affected:  1. Mental  2. Sensory/Pain  3. Neuromusculoskeletal  4. Movement Related Activities and Participation Affected:  1. General Tasks and Demands  2. Mobility  3. Self Care  4. Domestic Life  5. Interpersonal Interactions and Relationships  6. Community, Social and [place]   Number of elements (examined above) that affect the Plan of Care: 3: MODERATE COMPLEXITY   CLINICAL PRESENTATION:   Presentation: Evolving clinical presentation with changing clinical characteristics: MODERATE COMPLEXITY   CLINICAL DECISION MAKING:   Outcome Measure: Tool Used: Lower Extremity Functional Scale (LEFS)  Score:  Initial: 25/80 Most Recent: 43/80 (Date: 2-12 )   Interpretation of Score: 20 questions each scored on a 5 point scale with 0 representing \"extreme difficulty or unable to perform\" and 4 representing \"no difficulty\". The lower the score, the greater the functional disability. 80/80 represents no disability. Minimal detectable change is 9 points. Score 80 79-63 62-48 47-32 31-16 15-1 0   Modifier CH CI CJ CK CL CM CN     ? Mobility - Walking and Moving Around:     - CURRENT STATUS: CK - 40%-59% impaired, limited or restricted    - GOAL STATUS: CK - 40%-59% impaired, limited or restricted    - D/C STATUS:  ---------------To be determined---------------      Medical Necessity:   · Skilled intervention continues to be required due to lack of hip ROM and strength, which severely impairs function. Reason for Services/Other Comments:  · Patient continues to demonstrate capacity to improve flexibility, ROM and function which will increase independence.    Use of outcome tool(s) and clinical judgement create a POC that gives a: Questionable prediction of patient's progress: MODERATE COMPLEXITY            TREATMENT:   (In addition to Assessment/Re-Assessment sessions the following treatments were rendered)    Pre-treatment Symptoms/Complaints:   Nothing new to report today. Pain: Initial: Pain Intensity 1: 4  Post Session:  2     Therapeutic Exercise   (60 minutes ):  To decrease pain, improve flexibility and motion, and increase strength. Will provide verbal and manual cues as needed to ensure proper performance of the exercises. Will increase range of motion, resistance and intensity as pt tolerates. Nustep x 10  min, level 4  Thorough stretching for hip abd, SLR, and ER, IR in supine---  Rotation remains essentially nil for IR, and severely limited for ER  Active R hip flexion and extn, against manual resistance---1 x 12  SLR--2 x 10 each leg.  ---did much better with SLR today compared to last visit. Control and quality is much better. Step ups on 6 \" step ---1 x  20  Lateral step downs from 6\"--1 x 15 each leg  Standing hip abd--19#--2  x 12  Standing hip flexion--19#--2 x 12  standing hip abd--37.5#--1 x 20       [facility] Portal  Treatment/Session Assessment:  Pt tolerated treatment with no discomfort. Pt reported that he was very sore after therapy in the pool last visit. He does not want to do this again in our pool-- he may do more in the pool in the summer at his community pool. He continues to ambulate without any type of assistive device in the clinic ( uses rollator or st cane otherwise) and seems very pleased. With cues, gait can be better--otherwise pt has excess lateral shifting and little to no rotation. Some compliance with home exercise; wife pushes him to walk more. Started to discuss weaning from therapy, and transition to more independent exercise program.  Planning for 1-2 more visits.       · Response to Treatment:    Pt had an episode of low BS at the end of treatment;wife brought him an orange and he seemed fine when he left with her. .  · Compliance with Program/Exercises:     seems to be compliant; His goal is that he can return to driving ore easily, and walking better with less pain. · Recommendations/Intent for next treatment session:   Next visit will focus on improving passive and active R hip motion and strength, to allow better function. .    starting to plan for DC.     Total Treatment Duration: 60 min  Total number of treatments:   18  PT Patient Time In/Time Out  Time In: 0100  Time Out: [address] [facility], PT

## 2018-02-15 ENCOUNTER — APPOINTMENT (OUTPATIENT)
Dept: PHYSICAL THERAPY | Age: 83
End: 2018-02-15
Payer: MEDICARE

## 2018-02-19 ENCOUNTER — APPOINTMENT (OUTPATIENT)
Dept: PHYSICAL THERAPY | Age: 83
End: 2018-02-19
Payer: MEDICARE

## 2018-02-20 ENCOUNTER — HOSPITAL ENCOUNTER (OUTPATIENT)
Dept: PHYSICAL THERAPY | Age: 83
Discharge: HOME | End: 2018-02-20
Payer: MEDICARE

## 2018-02-20 PROCEDURE — 97110 THERAPEUTIC EXERCISES: CPT

## 2018-02-20 NOTE — PROGRESS NOTES
Zoila Jack  : 1935  [place] / [place] / Product Type: Medicare /  [address] [place]  at [facility]  [address], [address]  [phone]   [phone]        OUTPATIENT PHYSICAL THERAPY:  Daily Note    2018    ICD-10: Treatment Diagnosis: M25.551  Pain in R hip  M25.561  Stiffness in R hip  R26.2  Difficulty walking  Precautions/Allergies:   Latex   Fall Risk Score: 2 (? 5 = High Risk)  MD Orders: eval and treat MEDICAL/REFERRING DIAGNOSIS:  Unsteadiness on feet [R26.81]   DATE OF ONSET: about 3 months ago  REFERRING PHYSICIAN: Bonifacio Lazo MD  RETURN PHYSICIAN APPOINTMENT: TBD      ASSESSMENT:  Mr. Ren Parham presents to PT with referral for treatment in the pool due to severe arthritis of the R hip, as well as RLE weakness and difficulty walking. He has recently had home health PT to help with mobility. He has multiple medical co-morbidities, including difficulty with stairs that may make getting into and out of our pool very difficult. We discussed an initial plan of PT in the gym, to help improve flexibility and general strength, then probable transition to the pool. He seems well motivated; wife is supportive. I expect progress to be slow due to multiple medical problems and decreased tolerance for exercise. Pt has attended 18 visits to PT, for treatment of R hip and knee pain. He is making steady progress. He is doing more walking without his walker, and reports that getting into and out of the bed and the car are noticeably easier now. He has driven once or twice, which was one of his goals. He still demonstrates R hip weakness and stiffness, and gait is still wide-based and waddling, but he appears steady. He often uses st cane for amb, when he does not feel the need to use his walker. His active range is improving as well. WE are weaning PT to 1 x per week for 1-2 weeks, then plan for DC.   Did try 1 PT visit for therapy in the pool, but he did not care for this. PROBLEM LIST (Impacting functional limitations):  1. Decreased Strength  2. Decreased ADL/Functional Activities  3. Decreased Ambulation Ability/Technique  4. Decreased Balance  5. Increased Pain  6. Decreased Activity Tolerance  7. Decreased Flexibility/Joint Mobility  8. Decreased [place] with Home Exercise Program INTERVENTIONS PLANNED:  1. Balance Exercise  2. Bed Mobility  3. Gait Training  4. Home Exercise Program (HEP)  5. Range of Motion (ROM)  6. Therapeutic Exercise/Strengthening   7. Aquatics   TREATMENT PLAN:  Effective Dates: 12-5-2017 TO 3-1-2018. Frequency/Duration: 2 times a week for 8 weeks, and upon reassessment will adjust frequency and duration as progress indicates. GOALS: (Goals have been discussed and agreed upon with patient.)  Short-Term Functional Goals: Time Frame: 4 weeks  1. Improve R hip PROM to 90 + ° for ease in sitting and rising-- goal met for passive motion, not actively  2. Improve R hip strength to allow 1 x 10 SLR-- goal met  3. Improve ease of moving to allow pt to get in and out of bed without asst-- goal met  Discharge Goals: Time Frame: 12 weeks  1. Pt able to don socks and shoes with no more than set up asst-- not yet met  2. Improve strength and stability BLEs to allow reciprocal stair ascending-- on going  3. Pt to tolerate 30+ min exercise in the pool without increased sx-- pt did 1 PT appt in the pool but did not care for it. 4. Pt to be independent in basic HEP for ROM and strengthening ex  5. Rehabilitation Potential For Stated Goals: [id number] [facility] therapy, I certify that the treatment plan above will be carried out by a therapist or under their direction. Signed By:      February 20, 2018                       The information in this section was collected on 12-5 (except where otherwise noted).   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 His pain and weakness got much worse suddenly after MRI--he blames it on having to lie on the hard table for so long. Since that time about 2 months ago, his pain is worse and weakness is worse as well. Past Medical History/Comorbidities:   Mr. Efren Michel  has a past medical history of Arthritis; Atrial fibrillation ([address] CAD (coronary artery disease); Cancer ([address] DM (diabetes mellitus), type 2 ([address] Family history of prostate problems; GERD (gastroesophageal reflux disease); Hearing difficulty; HLD (hyperlipidemia); Hypertension; Hypothyroidism; Insulin dependent diabetes mellitus ([address] Kidney problem; and Systolic heart failure ([address] Mr. Efren Michel  has a past surgical history that includes hx coronary artery bypass graft; hx other surgical; hx other surgical; hx cataract removal (04/2011); hx tonsillectomy; hx hernia repair (08/2004); hx appendectomy (08/2004); hx prostatectomy (03/01/2006); pr cardiac surg procedure unlist (06/2007); hx aortic valve replacement (11/10/2011); pr cardiac surg procedure unlist (11/14/2011); pr cardiac surg procedure unlist (05/10/2013); hx orthopaedic (1973); and hx knee replacement (04/25/2016). R TKA 2015. CABG in 2007. Pacemaker and defibrillator in 2015, due to silent MI. Pt reports that he is in Afib all the time. Has IDDM, aortic calcification, HTN. Social History/Living Environment:     . Lives with wife in 1 level house. Has only  Lived in [place] 1 yr, moved from [place]  Prior Level of Function/Work/Activity:  Had been more mobile, with less pain prior to this episode of hip pain. Retired. Dominant Side:         RIGHT  Personal Factors:          Age:  81 y.o.   Current Medications:       Current Outpatient Prescriptions:     furosemide (LASIX) 40 mg tablet, TAKE 1 TABLET BY MOUTH TWO  TIMES DAILY, Disp: 180 Tab, Rfl: 3    carvedilol (COREG) 6.25 mg tablet, TAKE 1 TABLET BY MOUTH TWO  TIMES DAILY WITH MEALS, Disp: 180 Tab, Rfl: 3    potassium chloride (K-DUR, KLOR-CON) 20 mEq tablet, Take 1 Tab by mouth daily. , Disp: 90 Tab, Rfl: 3    tamsulosin (FLOMAX) 0.4 mg capsule, Take 1 Cap by mouth daily. , Disp: 90 Cap, Rfl: 3    traMADol-acetaminophen (ULTRACET) 37.5-325 mg per tablet, Take 1 Tab by mouth every six (6) hours as needed for Pain. Max Daily Amount: 4 Tabs., Disp: 30 Tab, Rfl: 2    GLUC/CHND/OM3/DHA/EPA/FISH/STR (GLUCOSAMINE CHONDROITIN PLUS PO), Take 2 Caplet by mouth daily. , Disp: , Rfl:     omeprazole (PRILOSEC) 20 mg capsule, Take 1 capsule by mouth  daily, Disp: 90 Cap, Rfl: 3    simvastatin (ZOCOR) 20 mg tablet, Take 1 tablet by mouth  nightly, Disp: 90 Tab, Rfl: 3    warfarin (COUMADIN) 5 mg tablet, 1 to 1&1/2 tabs every day or as directed (Patient taking differently: Take 5 mg by mouth daily. 1 to 1&1/2 tabs every day or as directed  Pt states he take 7.5mg Monday and Friday and 5mg on all other days), Disp: 120 Tab, Rfl: 3    insulin aspart (NOVOLOG) 100 unit/mL injection, by SubCUTAneous route as needed. 6 am, lunch 4 ; 6 supper-plus few units by scale if high, Disp: , Rfl:     clobetasol (TEMOVATE) 0.05 % ointment, Apply  to affected area two (2) times a day., Disp: 15 g, Rfl: 12    levothyroxine (SYNTHROID) 125 mcg tablet, Take 1 Tab by mouth Daily (before breakfast). , Disp: 90 Tab, Rfl: 3    lisinopril (ZESTRIL) 2.5 mg tablet, Take 1 Tab by mouth daily. , Disp: 90 Tab, Rfl: 3    metOLazone (ZAROXOLYN) 2.5 mg tablet, Take 1 Tab by mouth as needed. , Disp: 90 Tab, Rfl: 1    lancets 33 gauge misc, Test BS TID/PRN Dx E11.9.  Pt uses one touch delica 33 gauge lancets, Disp: 1 Package, Rfl: 12    Insulin Needles, Disposable, (BD INSULIN PEN NEEDLE UF MINI) 31 gauge x 3/16\" ndle, Use as directed, E11.9, Disp: 100 Pen Needle, Rfl: 12    lancets (ONETOUCH DELICA LANCETS) 30 gauge misc, Testing TID/PRN, E11.9, Disp: 1 Package, Rfl: 12    glucose blood VI test strips (ONETOUCH ULTRA TEST) strip, TEST BS TID AM DX E11.22, Disp: 100 Strip, Rfl: 12    aspirin delayed-release 81 mg tablet, Take 81 mg by mouth daily. , Disp: , Rfl:     multivitamin (ONE A DAY) tablet, Take 1 Tab by mouth daily. , Disp: , Rfl:     insulin glargine (LANTUS) 100 unit/mL injection, 16 Units by SubCUTAneous route. As directed , Disp: , Rfl:     ferrous sulfate (IRON) 325 mg (65 mg iron) tablet, Take 65 mg by mouth Daily (before breakfast). , Disp: , Rfl:    Date Last Reviewed:  2/20/2018   Number of Personal Factors/Comorbidities that affect the Plan of Care: 3+: HIGH COMPLEXITY   EXAMINATION:   Observation/Orthostatic Postural Assessment:          Pt is a large man, walks with rollator, sits comfortably on rollator. Has slight difficulty with sit to stand. Sits with poor posture; stands with slight trunk/ hip flexion. Slightly [place]. Palpation:          Some bilat lower leg swelling. Flat area palpable at superior aspect of flexed R knee ( distal quad atrophy?). Varicose veins prominent in R leg. ROM:            PROM Date:   12-5 Date:  1-11 Date:  2-12   R hip flex ~ 85 °    90+  90 + °    R hip abd ~ 25 °     35 °  35 °    R hip ER ~ 20 °  ~ 20 °  ~ 20-25 °          AROM:  R flex ~ 35 °   85 -90 °    R abd ~ 20 °   ~ 25-30 °              Strength:           Date:  12-5 Date:  1-11 Date:  2-12   R hip flex 3- 3- to 3 3+   R   Hip abd 2- to 2 2+ to 3- 3 to 3+    R knee extn 4- 4 4   R knee flex 3+ to 4- 4 4+    R DF 4           L hip flex 3+ 4    L knee extn 4 4+ to 5    L knee flex 4- 4    L DF 4-         Neurological Screen:        Sensation: Pt denies any sensory changes, denies peripheral neuropathy in LEs  Functional Mobility:         Gait/Ambulation:  ambs with rollator, shuffling gait, slight lean forward. Can walk short distances without walker ( eg, to the toilet). Pt reports that on good days, he can walk as far as about 4-5 blocks.         Bed Mobility:  Pt reports that he needs some asst at home to get legs into bed. On the mat in PT, he needed very min asst for R leg. Stairs:  No stairs in the home. Balance:          NT on initial assessment. Body Structures Involved:  1. Nerves  2. Bones  3. Joints  4. Muscles  5. Ligaments Body Functions Affected:  1. Mental  2. Sensory/Pain  3. Neuromusculoskeletal  4. Movement Related Activities and Participation Affected:  1. General Tasks and Demands  2. Mobility  3. Self Care  4. Domestic Life  5. Interpersonal Interactions and Relationships  6. Community, Social and [place]   Number of elements (examined above) that affect the Plan of Care: 3: MODERATE COMPLEXITY   CLINICAL PRESENTATION:   Presentation: Evolving clinical presentation with changing clinical characteristics: MODERATE COMPLEXITY   CLINICAL DECISION MAKING:   Outcome Measure: Tool Used: Lower Extremity Functional Scale (LEFS)  Score:  Initial: 25/80 Most Recent: 43/80 (Date: 2-12 )   Interpretation of Score: 20 questions each scored on a 5 point scale with 0 representing \"extreme difficulty or unable to perform\" and 4 representing \"no difficulty\". The lower the score, the greater the functional disability. 80/80 represents no disability. Minimal detectable change is 9 points. Score 80 79-63 62-48 47-32 31-16 15-1 0   Modifier CH CI CJ CK CL CM CN     ? Mobility - Walking and Moving Around:     - CURRENT STATUS: CK - 40%-59% impaired, limited or restricted    - GOAL STATUS: CK - 40%-59% impaired, limited or restricted    - D/C STATUS:  ---------------To be determined---------------      Medical Necessity:   · Skilled intervention continues to be required due to lack of hip ROM and strength, which severely impairs function. Reason for Services/Other Comments:  · Patient continues to demonstrate capacity to improve flexibility, ROM and function which will increase independence.    Use of outcome tool(s) and clinical judgement create a POC that gives a: Questionable prediction of patient's progress: MODERATE COMPLEXITY            TREATMENT:   (In addition to Assessment/Re-Assessment sessions the following treatments were rendered)    Pre-treatment Symptoms/Complaints: Patient reports that he has been very sore for last few days because he had a really hard time getting up out of his chair. Pain: Initial: Pain Intensity 1: 4  Post Session:  3     Therapeutic Exercise   (60 minutes ):  To decrease pain, improve flexibility and motion, and increase strength. Will provide verbal and manual cues as needed to ensure proper performance of the exercises. Will increase range of motion, resistance and intensity as pt tolerates. Nustep x 10  min, level 4  Thorough stretching for hip abd, SLR, and ER, IR in supine---  Rotation remains essentially nil for IR, and severely limited for ER  Active R hip flexion and extn, against manual resistance---1 x 12  SLR--2 x 10 each leg.  ---did much better with SLR today compared to last visit. Control and quality is much better. Step ups on 6 \" step ---1 x  20  Lateral step downs from 6\"--1 x 15 each leg  Standing hip abd--19#--2  x 12  Standing hip flexion--19#--2 x 12  standing hip abd--37.5#--1 x 20  Outdoor walking with cane and SBA  Tandem stance on half foam roll 3 x 30 sec         [employer]  Treatment/Session Assessment:  Pt tolerated treatment with no discomfort. Pt reported that he was very sore after therapy in the pool last visit. He does not want to do this again in our pool-- he may do more in the pool in the summer at his community pool. He continues to ambulate without any type of assistive device in the clinic ( uses rollator or st cane otherwise) and seems very pleased. With cues, gait can be better--otherwise pt has excess lateral shifting and little to no rotation. Some compliance with home exercise; wife pushes him to walk more.   Started to discuss weaning from therapy, and transition to more independent exercise program.  Planning for 1-2 more visits. · Response to Treatment:    Patient tolerated treatment with mild discomfort. He complained throughout about his sore hamstrings, but was able to do all exercises with good effort today. Patient indicated he was pleased that he was able to walk outside with his cane and hopes to do more of that at home. Plan to DC to home program after next couple of visits. · Compliance with Program/Exercises:     seems to be compliant; His goal is that he can return to driving ore easily, and walking better with less pain. · Recommendations/Intent for next treatment session:   Next visit will focus on improving passive and active R hip motion and strength, to allow better function. .    starting to plan for DC.     Total Treatment Duration: 60 min  Total number of treatments:   19  PT Patient Time In/Time Out  Time In: 0100  Time Out: [address], PTA

## 2018-02-22 ENCOUNTER — APPOINTMENT (OUTPATIENT)
Dept: PHYSICAL THERAPY | Age: 83
End: 2018-02-22
Payer: MEDICARE

## 2018-02-23 ENCOUNTER — APPOINTMENT (RX ONLY)
Dept: URBAN - METROPOLITAN AREA CLINIC 349 | Facility: CLINIC | Age: 83
Setting detail: DERMATOLOGY
End: 2018-02-23

## 2018-02-23 DIAGNOSIS — L57.0 ACTINIC KERATOSIS: ICD-10-CM

## 2018-02-23 DIAGNOSIS — L82.1 OTHER SEBORRHEIC KERATOSIS: ICD-10-CM

## 2018-02-23 DIAGNOSIS — L21.8 OTHER SEBORRHEIC DERMATITIS: ICD-10-CM | Status: INADEQUATELY CONTROLLED

## 2018-02-23 PROBLEM — F41.9 ANXIETY DISORDER, UNSPECIFIED: Status: ACTIVE | Noted: 2018-02-23

## 2018-02-23 PROCEDURE — 99213 OFFICE O/P EST LOW 20 MIN: CPT | Mod: 25

## 2018-02-23 PROCEDURE — 17004 DESTROY PREMAL LESIONS 15/>: CPT

## 2018-02-23 PROCEDURE — ? LIQUID NITROGEN

## 2018-02-23 PROCEDURE — ? COUNSELING

## 2018-02-23 PROCEDURE — ? OTHER

## 2018-02-23 ASSESSMENT — LOCATION DETAILED DESCRIPTION DERM
LOCATION DETAILED: LEFT SUPERIOR PARIETAL SCALP
LOCATION DETAILED: RIGHT POSTERIOR PARIETAL SCALP
LOCATION DETAILED: RIGHT SUPERIOR OCCIPITAL SCALP
LOCATION DETAILED: RIGHT OCCIPITAL SCALP
LOCATION DETAILED: RIGHT SUPERIOR POSTERIOR PARIETAL SCALP
LOCATION DETAILED: LEFT POSTERIOR PARIETAL SCALP
LOCATION DETAILED: MID-OCCIPITAL SCALP
LOCATION DETAILED: LEFT SUPERIOR OCCIPITAL SCALP
LOCATION DETAILED: SUPERIOR MID FOREHEAD
LOCATION DETAILED: LEFT CENTRAL FRONTAL SCALP
LOCATION DETAILED: RIGHT DISTAL DORSAL FOREARM
LOCATION DETAILED: LEFT DISTAL DORSAL FOREARM

## 2018-02-23 ASSESSMENT — LOCATION ZONE DERM
LOCATION ZONE: ARM
LOCATION ZONE: SCALP
LOCATION ZONE: FACE

## 2018-02-23 ASSESSMENT — LOCATION SIMPLE DESCRIPTION DERM
LOCATION SIMPLE: LEFT SCALP
LOCATION SIMPLE: LEFT FOREARM
LOCATION SIMPLE: RIGHT FOREARM
LOCATION SIMPLE: POSTERIOR SCALP
LOCATION SIMPLE: LEFT OCCIPITAL SCALP
LOCATION SIMPLE: SUPERIOR FOREHEAD
LOCATION SIMPLE: SCALP

## 2018-02-23 NOTE — PROCEDURE: LIQUID NITROGEN
Consent: The patient's consent was obtained including but not limited to risks of crusting, scabbing, blistering, scarring, darker or lighter pigmentary change, recurrence, incomplete removal and infection.
Number Of Freeze-Thaw Cycles: 2 freeze-thaw cycles
Duration Of Freeze Thaw-Cycle (Seconds): 3
Render Post-Care Instructions In Note?: no
Detail Level: Zone
Post-Care Instructions: I reviewed with the patient in detail post-care instructions. Patient is to wear sunprotection, and avoid picking at any of the treated lesions. Pt may apply Vaseline to crusted or scabbing areas.

## 2018-02-23 NOTE — PROCEDURE: OTHER
Detail Level: Zone
Note Text (......Xxx Chief Complaint.): This diagnosis correlates with the
Other (Free Text): Patient states he likes Aveeno  and Aquaphor better than Ketoconazole cream

## 2018-02-28 ENCOUNTER — HOSPITAL ENCOUNTER (OUTPATIENT)
Dept: PHYSICAL THERAPY | Age: 83
Discharge: HOME | End: 2018-02-28
Payer: MEDICARE

## 2018-02-28 PROCEDURE — 97110 THERAPEUTIC EXERCISES: CPT

## 2018-03-01 NOTE — PROGRESS NOTES
Dianne Garrett  : 1935  [place] / [place] / Product Type: Medicare /  [address]  at [place] [address] Therapy  [address], [address]  [phone]   [phone]        OUTPATIENT PHYSICAL THERAPY:  Daily Note    2018    ICD-10: Treatment Diagnosis: M25.551  Pain in R hip  M25.561  Stiffness in R hip  R26.2  Difficulty walking  Precautions/Allergies:   Latex   Fall Risk Score: 2 (? 5 = High Risk)  MD Orders: eval and treat MEDICAL/REFERRING DIAGNOSIS:  Unsteadiness on feet [R26.81]   DATE OF ONSET: about 3 months ago  REFERRING PHYSICIAN: Bryn Bejarano MD  RETURN PHYSICIAN APPOINTMENT: TBD      ASSESSMENT:  Mr. Vijay Thorpe presents to PT with referral for treatment in the pool due to severe arthritis of the R hip, as well as RLE weakness and difficulty walking. He has recently had home health PT to help with mobility. He has multiple medical co-morbidities, including difficulty with stairs that may make getting into and out of our pool very difficult. We discussed an initial plan of PT in the gym, to help improve flexibility and general strength, then probable transition to the pool. He seems well motivated; wife is supportive. I expect progress to be slow due to multiple medical problems and decreased tolerance for exercise. Pt has attended 18 visits to PT, for treatment of R hip and knee pain. He is making steady progress. He is doing more walking without his walker, and reports that getting into and out of the bed and the car are noticeably easier now. He has driven once or twice, which was one of his goals. He still demonstrates R hip weakness and stiffness, and gait is still wide-based and waddling, but he appears steady. He often uses st cane for amb, when he does not feel the need to use his walker. His active range is improving as well. WE are weaning PT to 1 x per week for 1-2 weeks, then plan for DC.   Did try 1 PT visit for therapy in the pool, but he did not care for this. PROBLEM LIST (Impacting functional limitations):  1. Decreased Strength  2. Decreased ADL/Functional Activities  3. Decreased Ambulation Ability/Technique  4. Decreased Balance  5. Increased Pain  6. Decreased Activity Tolerance  7. Decreased Flexibility/Joint Mobility  8. Decreased [place] with Home Exercise Program INTERVENTIONS PLANNED:  1. Balance Exercise  2. Bed Mobility  3. Gait Training  4. Home Exercise Program (HEP)  5. Range of Motion (ROM)  6. Therapeutic Exercise/Strengthening   7. Aquatics   TREATMENT PLAN:  Effective Dates: 12-5-2017 TO 3-1-2018. Frequency/Duration: 2 times a week for 8 weeks, and upon reassessment will adjust frequency and duration as progress indicates. GOALS: (Goals have been discussed and agreed upon with patient.)  Short-Term Functional Goals: Time Frame: 4 weeks  1. Improve R hip PROM to 90 + ° for ease in sitting and rising-- goal met for passive motion, not actively  2. Improve R hip strength to allow 1 x 10 SLR-- goal met  3. Improve ease of moving to allow pt to get in and out of bed without asst-- goal met  Discharge Goals: Time Frame: 12 weeks  1. Pt able to don socks and shoes with no more than set up asst-- not yet met  2. Improve strength and stability BLEs to allow reciprocal stair ascending-- on going  3. Pt to tolerate 30+ min exercise in the pool without increased sx-- pt did 1 PT appt in the pool but did not care for it. 4. Pt to be independent in basic HEP for ROM and strengthening ex  5. Rehabilitation Potential For Stated Goals: [id number] [facility] therapy, I certify that the treatment plan above will be carried out by a therapist or under their direction. Signed By:      February 28, 2018                       The information in this section was collected on 12-5 (except where otherwise noted).   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 His pain and weakness got much worse suddenly after MRI--he blames it on having to lie on the hard table for so long. Since that time about 2 months ago, his pain is worse and weakness is worse as well. Past Medical History/Comorbidities:   Mr. Jessica Dietrich  has a past medical history of Arthritis; Atrial fibrillation ([address] CAD (coronary artery disease); Cancer ([address] DM (diabetes mellitus), type 2 ([address] Family history of prostate problems; GERD (gastroesophageal reflux disease); Hearing difficulty; HLD (hyperlipidemia); Hypertension; Hypothyroidism; Insulin dependent diabetes mellitus ([address] Kidney problem; and Systolic heart failure ([address] Mr. Jessica Dietrich  has a past surgical history that includes hx coronary artery bypass graft; hx other surgical; hx other surgical; hx cataract removal (04/2011); hx tonsillectomy; hx hernia repair (08/2004); hx appendectomy (08/2004); hx prostatectomy (03/01/2006); pr cardiac surg procedure unlist (06/2007); hx aortic valve replacement (11/10/2011); pr cardiac surg procedure unlist (11/14/2011); pr cardiac surg procedure unlist (05/10/2013); hx orthopaedic (1973); and hx knee replacement (04/25/2016). R TKA 2015. CABG in 2007. Pacemaker and defibrillator in 2015, due to silent MI. Pt reports that he is in Afib all the time. Has IDDM, aortic calcification, HTN. Social History/Living Environment:     . Lives with wife in 1 level house. Has only  Lived in [place] 1 yr, moved from [place]  Prior Level of Function/Work/Activity:  Had been more mobile, with less pain prior to this episode of hip pain. Retired. Dominant Side:         RIGHT  Personal Factors:          Age:  81 y.o.   Current Medications:       Current Outpatient Prescriptions:     furosemide (LASIX) 40 mg tablet, TAKE 1 TABLET BY MOUTH TWO  TIMES DAILY, Disp: 180 Tab, Rfl: 3    carvedilol (COREG) 6.25 mg tablet, TAKE 1 TABLET BY MOUTH TWO  TIMES DAILY WITH MEALS, Disp: 180 Tab, Rfl: 3    potassium chloride (K-DUR, KLOR-CON) 20 mEq tablet, Take 1 Tab by mouth daily. , Disp: 90 Tab, Rfl: 3    tamsulosin (FLOMAX) 0.4 mg capsule, Take 1 Cap by mouth daily. , Disp: 90 Cap, Rfl: 3    traMADol-acetaminophen (ULTRACET) 37.5-325 mg per tablet, Take 1 Tab by mouth every six (6) hours as needed for Pain. Max Daily Amount: 4 Tabs., Disp: 30 Tab, Rfl: 2    GLUC/CHND/OM3/DHA/EPA/FISH/STR (GLUCOSAMINE CHONDROITIN PLUS PO), Take 2 Caplet by mouth daily. , Disp: , Rfl:     omeprazole (PRILOSEC) 20 mg capsule, Take 1 capsule by mouth  daily, Disp: 90 Cap, Rfl: 3    simvastatin (ZOCOR) 20 mg tablet, Take 1 tablet by mouth  nightly, Disp: 90 Tab, Rfl: 3    warfarin (COUMADIN) 5 mg tablet, 1 to 1&1/2 tabs every day or as directed (Patient taking differently: Take 5 mg by mouth daily. 1 to 1&1/2 tabs every day or as directed  Pt states he take 7.5mg Monday and Friday and 5mg on all other days), Disp: 120 Tab, Rfl: 3    insulin aspart (NOVOLOG) 100 unit/mL injection, by SubCUTAneous route as needed. 6 am, lunch 4 ; 6 supper-plus few units by scale if high, Disp: , Rfl:     clobetasol (TEMOVATE) 0.05 % ointment, Apply  to affected area two (2) times a day., Disp: 15 g, Rfl: 12    levothyroxine (SYNTHROID) 125 mcg tablet, Take 1 Tab by mouth Daily (before breakfast). , Disp: 90 Tab, Rfl: 3    lisinopril (ZESTRIL) 2.5 mg tablet, Take 1 Tab by mouth daily. , Disp: 90 Tab, Rfl: 3    metOLazone (ZAROXOLYN) 2.5 mg tablet, Take 1 Tab by mouth as needed. , Disp: 90 Tab, Rfl: 1    lancets 33 gauge misc, Test BS TID/PRN Dx E11.9.  Pt uses one touch delica 33 gauge lancets, Disp: 1 Package, Rfl: 12    Insulin Needles, Disposable, (BD INSULIN PEN NEEDLE UF MINI) 31 gauge x 3/16\" ndle, Use as directed, E11.9, Disp: 100 Pen Needle, Rfl: 12    lancets (ONETOUCH DELICA LANCETS) 30 gauge misc, Testing TID/PRN, E11.9, Disp: 1 Package, Rfl: 12    glucose blood VI test strips (ONETOUCH ULTRA TEST) strip, TEST BS TID AM DX E11.22, Disp: 100 Strip, Rfl: 12    aspirin delayed-release 81 mg tablet, Take 81 mg by mouth daily. , Disp: , Rfl:     multivitamin (ONE A DAY) tablet, Take 1 Tab by mouth daily. , Disp: , Rfl:     insulin glargine (LANTUS) 100 unit/mL injection, 16 Units by SubCUTAneous route. As directed , Disp: , Rfl:     ferrous sulfate (IRON) 325 mg (65 mg iron) tablet, Take 65 mg by mouth Daily (before breakfast). , Disp: , Rfl:    Date Last Reviewed:  2/28/2018   Number of Personal Factors/Comorbidities that affect the Plan of Care: 3+: HIGH COMPLEXITY   EXAMINATION:   Observation/Orthostatic Postural Assessment:          Pt is a large man, walks with rollator, sits comfortably on rollator. Has slight difficulty with sit to stand. Sits with poor posture; stands with slight trunk/ hip flexion. Slightly [place]. Palpation:          Some bilat lower leg swelling. Flat area palpable at superior aspect of flexed R knee ( distal quad atrophy?). Varicose veins prominent in R leg. ROM:            PROM Date:   12-5 Date:  1-11 Date:  2-12   R hip flex ~ 85 °    90+  90 + °    R hip abd ~ 25 °     35 °  35 °    R hip ER ~ 20 °  ~ 20 °  ~ 20-25 °          AROM:  R flex ~ 35 °   85 -90 °    R abd ~ 20 °   ~ 25-30 °              Strength:           Date:  12-5 Date:  1-11 Date:  2-12   R hip flex 3- 3- to 3 3+   R   Hip abd 2- to 2 2+ to 3- 3 to 3+    R knee extn 4- 4 4   R knee flex 3+ to 4- 4 4+    R DF 4           L hip flex 3+ 4    L knee extn 4 4+ to 5    L knee flex 4- 4    L DF 4-         Neurological Screen:        Sensation: Pt denies any sensory changes, denies peripheral neuropathy in LEs  Functional Mobility:         Gait/Ambulation:  ambs with rollator, shuffling gait, slight lean forward. Can walk short distances without walker ( eg, to the toilet). Pt reports that on good days, he can walk as far as about 4-5 blocks.         Bed Mobility:  Pt reports that he needs some asst at home to get legs into bed. On the mat in PT, he needed very min asst for R leg. Stairs:  No stairs in the home. Balance:          NT on initial assessment. Body Structures Involved:  1. Nerves  2. Bones  3. Joints  4. Muscles  5. Ligaments Body Functions Affected:  1. Mental  2. Sensory/Pain  3. Neuromusculoskeletal  4. Movement Related Activities and Participation Affected:  1. General Tasks and Demands  2. Mobility  3. Self Care  4. Domestic Life  5. Interpersonal Interactions and Relationships  6. Community, Social and [place]   Number of elements (examined above) that affect the Plan of Care: 3: MODERATE COMPLEXITY   CLINICAL PRESENTATION:   Presentation: Evolving clinical presentation with changing clinical characteristics: MODERATE COMPLEXITY   CLINICAL DECISION MAKING:   Outcome Measure: Tool Used: Lower Extremity Functional Scale (LEFS)  Score:  Initial: 25/80 Most Recent: 43/80 (Date: 2-12 )   Interpretation of Score: 20 questions each scored on a 5 point scale with 0 representing \"extreme difficulty or unable to perform\" and 4 representing \"no difficulty\". The lower the score, the greater the functional disability. 80/80 represents no disability. Minimal detectable change is 9 points. Score 80 79-63 62-48 47-32 31-16 15-1 0   Modifier CH CI CJ CK CL CM CN     ? Mobility - Walking and Moving Around:     - CURRENT STATUS: CK - 40%-59% impaired, limited or restricted    - GOAL STATUS: CK - 40%-59% impaired, limited or restricted    - D/C STATUS:  ---------------To be determined---------------      Medical Necessity:   · Skilled intervention continues to be required due to lack of hip ROM and strength, which severely impairs function. Reason for Services/Other Comments:  · Patient continues to demonstrate capacity to improve flexibility, ROM and function which will increase independence.    Use of outcome tool(s) and clinical judgement create a POC that gives a: Questionable prediction of patient's progress: MODERATE COMPLEXITY            TREATMENT:   (In addition to Assessment/Re-Assessment sessions the following treatments were rendered)    Pre-treatment Symptoms/Complaints: Patient reports that he has been about the same over the past fe days--some days better than others, but he can tell a difference win how he feels with therapy. He drove himself to PT today without his wife!!   Pain: Initial: Pain Intensity 1: 2  Post Session:  2ish     Therapeutic Exercise   (45 minutes ):  To decrease pain, improve flexibility and motion, and increase strength. Will provide verbal and manual cues as needed to ensure proper performance of the exercises. Will increase range of motion, resistance and intensity as pt tolerates. Nustep x 10  min, level 4  Thorough stretching for hip abd, SLR, and ER, IR in supine---  Rotation remains essentially nil for IR, and severely limited for ER  Active R hip flexion and extn, against manual resistance---1 x 12  SLR--2 x 10 each leg.  ---did much better with SLR compared to last week. Control and quality is much better. Bridging with LEs on traction stool  (he lacks enough knee flexion to allow bridging)---1 x 15  Step ups on 6 \" step ---1 x  20  Lateral step downs from 6\"--1 x 15 each leg  Standing hip abd--19#--2  x 12  Standing hip flexion--19#--2 x 12  standing hip abd--37.5#--1 x 20      [employer] Portal  Treatment/Session Assessment:  Pt tolerated treatment with no discomfort. He continues to ambulate without any type of assistive device in the clinic ( uses rollator or st cane otherwise) and seems very pleased. With cues, gait can be better--otherwise pt has excess lateral shifting and little to no rotation. He really seems to enjoy coming to therapy; he does still exhib moderate to severe stiffness in B hips and knees which is not expected to improve much.   Discussed that regular exercise should hel prevent any worsening of the stiffness or mobility. He is not a candidate for NANCY. Some compliance with home exercise; wife pushes him to walk more. Started to discuss weaning from therapy, and transition to more independent exercise program.  Planning for 1-2 more visits over the next month. .      · Response to Treatment:    Patient tolerated treatment with mild discomfort. Stiffness does not seem to be changing, in the hips or knees. Standing flexion and extn are maybe some better. Patient indicated he was pleased that he was able to walk outside with his cane and hopes to do more of that at home. Plan to DC to home program after next couple of visits. · Compliance with Program/Exercises:     seems to be compliant; He has returned to drving independently,  and walking better with less pain. · Recommendations/Intent for next treatment session:   Next visit will focus on improving passive and active R hip motion and strength, to allow better function. .    starting to plan for DC.     Total Treatment Duration: 45 min  Total number of treatments:   20  PT Patient Time In/Time Out  Time In: 0245  Time Out: 0330    Kary Willis, PT,

## 2018-03-06 ENCOUNTER — HOSPITAL ENCOUNTER (OUTPATIENT)
Dept: PHYSICAL THERAPY | Age: 83
Discharge: HOME | End: 2018-03-06
Payer: MEDICARE

## 2018-03-06 PROCEDURE — 97110 THERAPEUTIC EXERCISES: CPT

## 2018-03-06 NOTE — PROGRESS NOTES
Joel Posada  : 1935  [place] / [place] PART [place] B / Product Type: Medicare /  [address]  at [facility]  [address], [facility] [address]  [phone]   [phone]        OUTPATIENT PHYSICAL THERAPY:  Daily Note and RECERTIFICATION   6039    ICD-10: Treatment Diagnosis: M25.551  Pain in R hip  M25.561  Stiffness in R hip  R26.2  Difficulty walking  Precautions/Allergies:   Latex   Fall Risk Score: 2 (? 5 = High Risk)  MD Orders: eval and treat MEDICAL/REFERRING DIAGNOSIS:  Unsteadiness on feet [R26.81]   DATE OF ONSET: about 3 months ago  REFERRING PHYSICIAN: Lian Aguilar MD  RETURN PHYSICIAN APPOINTMENT: TBD      ASSESSMENT:     Pt has attended 21 visits to PT, for treatment of R hip and knee pain. He is making steady progress. He is doing more walking without his walker, and reports that getting into and out of the bed and the car are noticeably easier now. He is independent with driving,  which was one of his goals. He still demonstrates R hip weakness and stiffness, and gait is still wide-based and waddling, but he appears steady. He often uses st cane for amb, when he does not feel the need to use his walker, and ambs ad barak in the PT clinic without any assistive device. We are weaning PT to 1 x per week for 1-2 weeks, then plan for DC. Did try 1 PT visit for therapy in the pool, but he did not care for this; he was not able to exercise as well in the water as in the gym. PROBLEM LIST (Impacting functional limitations):  1. Decreased Strength  2. Decreased ADL/Functional Activities  3. Decreased Ambulation Ability/Technique  4. Decreased Balance  5. Increased Pain  6. Decreased Activity Tolerance  7. Decreased Flexibility/Joint Mobility  8. Decreased [place] with Home Exercise Program INTERVENTIONS PLANNED:  1. Balance Exercise  2. Bed Mobility  3. Gait Training  4.  Home Exercise Program (HEP)  5. Range of Motion (ROM)  6. Therapeutic Exercise/Strengthening   7. Aquatics   TREATMENT PLAN:                                             ### Effective Dates: 3-6-2018 through  5-      Frequency/Duration: 1 x per week, then wean to QOW,   GOALS: (Goals have been discussed and agreed upon with patient.)  Short-Term Functional Goals: Time Frame: 4 weeks  1. Improve R hip PROM to 90 + ° for ease in sitting and rising-- goal met for passive motion, not actively  2. Improve R hip strength to allow 1 x 10 SLR-- goal met  3. Improve ease of moving to allow pt to get in and out of bed without asst-- goal met  Discharge Goals: Time Frame: 12 weeks  1. Pt able to don socks and shoes with no more than set up asst-- not yet met  2. Improve strength and stability BLEs to allow reciprocal stair ascending-- on going  3. Pt to tolerate 30+ min exercise in the pool without increased sx-- pt did 1 PT appt in the pool but did not care for it. 4. Pt to be independent in basic HEP for ROM and strengthening ex  5. Rehabilitation Potential For Stated Goals: [id number] [facility] therapy, I certify that the treatment plan above will be carried out by a therapist or under their direction. Regarding [facility] therapy, I certify that the treatment plan above will be carried out by a therapist or under their direction. Thank you for this referral,  Gretchen Velez, PT       Referring Physician Signature: Bonifacio Lazo MD          Date                             The information in this section was collected on 12-5 (except where otherwise noted). 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 His pain and weakness got much worse suddenly after MRI--he blames it on having to lie on the hard table for so long. Since that time about 2 months ago, his pain is worse and weakness is worse as well.    Past Medical History/Comorbidities:   Mr. Shea Ruth  has a past medical history of Arthritis; Atrial fibrillation ([facility] [address] CAD (coronary artery disease); Cancer ([facility] [address] DM (diabetes mellitus), type 2 ([facility] [address] Family history of prostate problems; GERD (gastroesophageal reflux disease); Hearing difficulty; HLD (hyperlipidemia); Hypertension; Hypothyroidism; Insulin dependent diabetes mellitus ([facility] [address] Kidney problem; and Systolic heart failure ([facility] [address] Mr. Shea Ruth  has a past surgical history that includes hx coronary artery bypass graft; hx other surgical; hx other surgical; hx cataract removal (04/2011); hx tonsillectomy; hx hernia repair (08/2004); hx appendectomy (08/2004); hx prostatectomy (03/01/2006); pr cardiac surg procedure unlist (06/2007); hx aortic valve replacement (11/10/2011); pr cardiac surg procedure unlist (11/14/2011); pr cardiac surg procedure unlist (05/10/2013); hx orthopaedic (1973); and hx knee replacement (04/25/2016). R TKA 2015. CABG in 2007. Pacemaker and defibrillator in 2015, due to silent MI. Pt reports that he is in Afib all the time. Has IDDM, aortic calcification, HTN. Social History/Living Environment:     . Lives with wife in 1 level house. Has only  Lived in [place] 1 yr, moved from [place]  Prior Level of Function/Work/Activity:  Had been more mobile, with less pain prior to this episode of hip pain. Retired. Dominant Side:         RIGHT  Personal Factors:          Age:  81 y.o. Current Medications:       Current Outpatient Prescriptions:     furosemide (LASIX) 40 mg tablet, TAKE 1 TABLET BY MOUTH TWO  TIMES DAILY, Disp: 180 Tab, Rfl: 3    carvedilol (COREG) 6.25 mg tablet, TAKE 1 TABLET BY MOUTH TWO  TIMES DAILY WITH MEALS, Disp: 180 Tab, Rfl: 3    potassium chloride (K-DUR, KLOR-CON) 20 mEq tablet, Take 1 Tab by mouth daily. , Disp: 90 Tab, Rfl: 3    tamsulosin (FLOMAX) 0.4 mg capsule, Take 1 Cap by mouth daily. , Disp: 90 Cap, Rfl: 3    traMADol-acetaminophen (ULTRACET) 37.5-325 mg per tablet, Take 1 Tab by mouth every six (6) hours as needed for Pain. Max Daily Amount: 4 Tabs., Disp: 30 Tab, Rfl: 2    GLUC/CHND/OM3/DHA/EPA/FISH/STR (GLUCOSAMINE CHONDROITIN PLUS PO), Take 2 Caplet by mouth daily. , Disp: , Rfl:     omeprazole (PRILOSEC) 20 mg capsule, Take 1 capsule by mouth  daily, Disp: 90 Cap, Rfl: 3    simvastatin (ZOCOR) 20 mg tablet, Take 1 tablet by mouth  nightly, Disp: 90 Tab, Rfl: 3    warfarin (COUMADIN) 5 mg tablet, 1 to 1&1/2 tabs every day or as directed (Patient taking differently: Take 5 mg by mouth daily. 1 to 1&1/2 tabs every day or as directed  Pt states he take 7.5mg Monday and Friday and 5mg on all other days), Disp: 120 Tab, Rfl: 3    insulin aspart (NOVOLOG) 100 unit/mL injection, by SubCUTAneous route as needed. 6 am, lunch 4 ; 6 supper-plus few units by scale if high, Disp: , Rfl:     clobetasol (TEMOVATE) 0.05 % ointment, Apply  to affected area two (2) times a day., Disp: 15 g, Rfl: 12    levothyroxine (SYNTHROID) 125 mcg tablet, Take 1 Tab by mouth Daily (before breakfast). , Disp: 90 Tab, Rfl: 3    lisinopril (ZESTRIL) 2.5 mg tablet, Take 1 Tab by mouth daily. , Disp: 90 Tab, Rfl: 3    metOLazone (ZAROXOLYN) 2.5 mg tablet, Take 1 Tab by mouth as needed. , Disp: 90 Tab, Rfl: 1    lancets 33 gauge misc, Test BS TID/PRN Dx E11.9. Pt uses one touch delica 33 gauge lancets, Disp: 1 Package, Rfl: 12    Insulin Needles, Disposable, (BD INSULIN PEN NEEDLE UF MINI) 31 gauge x 3/16\" ndle, Use as directed, E11.9, Disp: 100 Pen Needle, Rfl: 12    lancets (ONETOUCH DELICA LANCETS) 30 gauge misc, Testing TID/PRN, E11.9, Disp: 1 Package, Rfl: 12    glucose blood VI test strips (ONETOUCH ULTRA TEST) strip, TEST BS TID AM DX E11.22, Disp: 100 Strip, Rfl: 12    aspirin delayed-release 81 mg tablet, Take 81 mg by mouth daily. , Disp: , Rfl:     multivitamin (ONE A DAY) tablet, Take 1 Tab by mouth daily. , Disp: , Rfl:     insulin glargine (LANTUS) 100 unit/mL injection, 16 Units by SubCUTAneous route. As directed , Disp: , Rfl:     ferrous sulfate (IRON) 325 mg (65 mg iron) tablet, Take 65 mg by mouth Daily (before breakfast). , Disp: , Rfl:    Date Last Reviewed:  3/6/2018   Number of Personal Factors/Comorbidities that affect the Plan of Care: 3+: HIGH COMPLEXITY   EXAMINATION:   Observation/Orthostatic Postural Assessment:          Pt is a large man, walks with rollator, sits comfortably on rollator. Has slight difficulty with sit to stand. Sits with poor posture; stands with slight trunk/ hip flexion. Slightly Circle. Palpation:          Some bilat lower leg swelling. Flat area palpable at superior aspect of flexed R knee ( distal quad atrophy?). Varicose veins prominent in R leg. ROM:            PROM Date:   12-5 Date:  1-11 Date:  2-12   R hip flex ~ 85 °    90+  90 + °    R hip abd ~ 25 °     35 °  35 °    R hip ER ~ 20 °  ~ 20 °  ~ 20-25 °          AROM:  R flex ~ 35 °   85 -90 °    R abd ~ 20 °   ~ 25-30 °              Strength:           Date:  12-5 Date:  1-11 Date:  2-12   R hip flex 3- 3- to 3 3+   R   Hip abd 2- to 2 2+ to 3- 3 to 3+    R knee extn 4- 4 4   R knee flex 3+ to 4- 4 4+    R DF 4           L hip flex 3+ 4    L knee extn 4 4+ to 5    L knee flex 4- 4    L DF 4-         Neurological Screen:        Sensation: Pt denies any sensory changes, denies peripheral neuropathy in LEs  Functional Mobility:         Gait/Ambulation:  ambs with rollator, shuffling gait, slight lean forward. Can walk short distances without walker ( eg, to the toilet). Pt reports that on good days, he can walk as far as about 4-5 blocks. Bed Mobility:  Pt reports that he needs some asst at home to get legs into bed. On the mat in PT, he needed very min asst for R leg. Stairs:  No stairs in the home. Balance:          NT on initial assessment. Body Structures Involved:  1. Nerves  2. Bones  3. Joints  4. Muscles  5.  Ligaments Body Functions Affected:  1. Mental  2. Sensory/Pain  3. Neuromusculoskeletal  4. Movement Related Activities and Participation Affected:  1. General Tasks and Demands  2. Mobility  3. Self Care  4. Domestic Life  5. Interpersonal Interactions and Relationships  6. Community, Social and [place]   Number of elements (examined above) that affect the Plan of Care: 3: MODERATE COMPLEXITY   CLINICAL PRESENTATION:   Presentation: Evolving clinical presentation with changing clinical characteristics: MODERATE COMPLEXITY   CLINICAL DECISION MAKING:   Outcome Measure: Tool Used: Lower Extremity Functional Scale (LEFS)  Score:  Initial: 25/80 Most Recent: 43/80 (Date: 2-12 )   Interpretation of Score: 20 questions each scored on a 5 point scale with 0 representing \"extreme difficulty or unable to perform\" and 4 representing \"no difficulty\". The lower the score, the greater the functional disability. 80/80 represents no disability. Minimal detectable change is 9 points. Score 80 79-63 62-48 47-32 31-16 15-1 0   Modifier CH CI CJ CK CL CM CN     ? Mobility - Walking and Moving Around:     - CURRENT STATUS: CK - 40%-59% impaired, limited or restricted    - GOAL STATUS: CK - 40%-59% impaired, limited or restricted    - D/C STATUS:  ---------------To be determined---------------      Medical Necessity:   · Skilled intervention continues to be required due to lack of hip ROM and strength, which severely impairs function. Reason for Services/Other Comments:  · Patient continues to demonstrate capacity to improve flexibility, ROM and function which will increase independence. Use of outcome tool(s) and clinical judgement create a POC that gives a: Questionable prediction of patient's progress: MODERATE COMPLEXITY            TREATMENT:   (In addition to Assessment/Re-Assessment sessions the following treatments were rendered)    Pre-treatment Symptoms/Complaints: Patient reports that he feels he is doing ok.   Much better now since he has had the therapy. Not quite ready for DC. Pain: Initial: Pain Intensity 1: 2  Post Session:  2 to 1 reno     Therapeutic Exercise   (45 minutes ):  To decrease pain, improve flexibility and motion, and increase strength. Will provide verbal and manual cues as needed to ensure proper performance of the exercises. Will increase range of motion, resistance and intensity as pt tolerates. Nustep x 10  min, level 4  Thorough stretching for hip abd, SLR, and ER, IR in supine---  Rotation remains essentially nil for IR, and severely limited for ER  Reviewed exercises for HEP:  Seated knee extn stretch, with self over-pressure  Lower trunk rotation--cues to try to keep shoulders down for better trunk stretch    SLR--2 x 12 ach leg.  --- Control and quality is  better. Bridging --1 x 10---with difficulty due to lack of good knee flexion  Sidelying hip abd--1 x 10  Clam shell ex--1 x 15  Step ups on 6 \" step ---1 x  20  Lateral step downs from 6\"--1 x 15 each leg  Pt also demonstrated the standing LE ex he also does at home      [employer] Portal  Treatment/Session Assessment:  Pt tolerated treatment with no discomfort. He now rarely uses his rollator to come to therapy and has resumed driving. With cues, gait can be better--otherwise pt has excess lateral shifting and little to no rotation. He really seems to enjoy coming to therapy; he does still exhib moderate to severe stiffness in B hips and knees which is not expected to improve much. Discussed that regular exercise should hel prevent any worsening of the stiffness or mobility. Apparently he has good compliance with home exercise; wife pushes him to walk more. Started to discuss weaning from therapy, and transition to more independent exercise program.  Planning for 1more visit in 2 weeks, then DC to HEP. · Response to Treatment:    Stiffness does not seem to be changing, in the hips or knees.   Standing flexion and extn are maybe some better. · Compliance with Program/Exercises:     seems to be compliant; He has returned to drving independently,  and walking better with less pain. · Recommendations/Intent for next treatment session:   Next visit will focus on improving passive and active R hip motion and strength, to allow better function. .    starting to plan for DC.     Total Treatment Duration: 45 min  Total number of treatments:   21  PT Patient Time In/Time Out  Time In: 0200  Time Out: 0245    Digna Jean PT,

## 2018-03-07 ENCOUNTER — APPOINTMENT (OUTPATIENT)
Dept: PHYSICAL THERAPY | Age: 83
End: 2018-03-07
Payer: MEDICARE

## 2018-03-21 ENCOUNTER — HOSPITAL ENCOUNTER (OUTPATIENT)
Dept: PHYSICAL THERAPY | Age: 83
Discharge: HOME | End: 2018-03-21
Payer: MEDICARE

## 2018-03-21 PROCEDURE — G8980 MOBILITY D/C STATUS: HCPCS

## 2018-03-21 PROCEDURE — 97110 THERAPEUTIC EXERCISES: CPT

## 2018-03-21 PROCEDURE — G8979 MOBILITY GOAL STATUS: HCPCS

## 2018-03-21 NOTE — PROGRESS NOTES
Humza Gopi  : 1935  [place] / [place] PART [place] B / Product Type: Medicare /  [address]  at [facility]  [address], [address]  [phone]   [phone]        OUTPATIENT PHYSICAL THERAPY:  Daily Note and Discharge Summary   3/21/2018    ICD-10: Treatment Diagnosis: M25.551  Pain in R hip  M25.561  Stiffness in R hip  R26.2  Difficulty walking  Precautions/Allergies:   Latex   Fall Risk Score: 2 (? 5 = High Risk)  MD Orders: eval and treat MEDICAL/REFERRING DIAGNOSIS:  Unsteadiness on feet [R26.81]   DATE OF ONSET: about 3 months ago  REFERRING PHYSICIAN: Gianni Quezada MD  RETURN PHYSICIAN APPOINTMENT: TBD      ASSESSMENT:     Pt has attended 22 visits to PT, for treatment of R hip and knee pain. He made steady progress with improving mobility and decreasing pain, although he still has complaints of aching in the legs. He comes in to therapy now without his walker, and reports that getting into and out of the bed and the car are noticeably easier now. He is independent with driving,  which was one of his goals. He still demonstrates some R hip weakness and stiffness, and gait is still wide-based and waddling, but much less now. He seems to have reached his potential at this time with therapy, and is independent with exercise and stretching at home. Pt is DC'd from PT today. PROBLEM LIST (Impacting functional limitations):  1. Decreased Strength  2. Decreased ADL/Functional Activities  3. Decreased Ambulation Ability/Technique  4. Decreased Balance  5. Increased Pain  6. Decreased Activity Tolerance  7. Decreased Flexibility/Joint Mobility  8. Decreased [place] with Home Exercise Program INTERVENTIONS PLANNED:  1. Balance Exercise  2. Bed Mobility  3. Gait Training  4. Home Exercise Program (HEP)  5. Range of Motion (ROM)  6. Therapeutic Exercise/Strengthening   7.  Aquatics   TREATMENT PLAN: ### Effective Dates: 3-6-2018 through  5-      Frequency/Duration: 1 x per week, then wean to QOW,   GOALS: (Goals have been discussed and agreed upon with patient.)  Short-Term Functional Goals: Time Frame: 4 weeks  1. Improve R hip PROM to 90 + ° for ease in sitting and rising-- goal met for passive motion, not actively  2. Improve R hip strength to allow 1 x 10 SLR-- goal met  3. Improve ease of moving to allow pt to get in and out of bed without asst-- goal met  Discharge Goals: Time Frame: 12 weeks  1. Pt able to don socks and shoes with no more than set up asst-- not yet met  2. Improve strength and stability BLEs to allow reciprocal stair ascending--goal met  3. Pt to tolerate 30+ min exercise in the pool without increased sx-- pt did 1 PT appt in the pool but did not care for it. 4. Pt to be independent in basic HEP for ROM and strengthening ex--goal met  5. Rehabilitation Potential For Stated Goals: [id number] [facility] therapy, I certify that the treatment plan above will be carried out by a therapist or under their direction. Regarding [facility] therapy, I certify that the treatment plan above will be carried out by a therapist or under their direction. Thank you for this referral,  Kary Willis, PT                 The information in this section was collected on 12-5 (except where otherwise noted). 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 His pain and weakness got much worse suddenly after MRI--he blames it on having to lie on the hard table for so long. Since that time about 2 months ago, his pain is worse and weakness is worse as well. Past Medical History/Comorbidities:   Mr. Peng Guerin  has a past medical history of Arthritis; Atrial fibrillation ([facility] CAD (coronary artery disease);  Cancer ([facility] DM (diabetes mellitus), type 2 ([facility] Family history of prostate problems; GERD (gastroesophageal reflux disease); Hearing difficulty; HLD (hyperlipidemia); Hypertension; Hypothyroidism; Insulin dependent diabetes mellitus ([facility] Kidney problem; and Systolic heart failure ([facility] Mr. Efren Michel  has a past surgical history that includes hx coronary artery bypass graft; hx other surgical; hx other surgical; hx cataract removal (04/2011); hx tonsillectomy; hx hernia repair (08/2004); hx appendectomy (08/2004); hx prostatectomy (03/01/2006); pr cardiac surg procedure unlist (06/2007); hx aortic valve replacement (11/10/2011); pr cardiac surg procedure unlist (11/14/2011); pr cardiac surg procedure unlist (05/10/2013); hx orthopaedic (1973); and hx knee replacement (04/25/2016). R TKA 2015. CABG in 2007. Pacemaker and defibrillator in 2015, due to silent MI. Pt reports that he is in Afib all the time. Has IDDM, aortic calcification, HTN. Social History/Living Environment:     . Lives with wife in 1 level house. Has only  Lived in [place] 1 yr, moved from [place]  Prior Level of Function/Work/Activity:  Had been more mobile, with less pain prior to this episode of hip pain. Retired. Dominant Side:         RIGHT  Personal Factors:          Age:  81 y.o. Current Medications:       Current Outpatient Prescriptions:     furosemide (LASIX) 40 mg tablet, TAKE 1 TABLET BY MOUTH TWO  TIMES DAILY, Disp: 180 Tab, Rfl: 3    carvedilol (COREG) 6.25 mg tablet, TAKE 1 TABLET BY MOUTH TWO  TIMES DAILY WITH MEALS, Disp: 180 Tab, Rfl: 3    potassium chloride (K-DUR, KLOR-CON) 20 mEq tablet, Take 1 Tab by mouth daily. , Disp: 90 Tab, Rfl: 3    tamsulosin (FLOMAX) 0.4 mg capsule, Take 1 Cap by mouth daily. , Disp: 90 Cap, Rfl: 3    traMADol-acetaminophen (ULTRACET) 37.5-325 mg per tablet, Take 1 Tab by mouth every six (6) hours as needed for Pain.  Max Daily Amount: 4 Tabs., Disp: 30 Tab, Rfl: 2    GLUC/CHND/OM3/DHA/EPA/FISH/STR (GLUCOSAMINE CHONDROITIN PLUS PO), Take 2 Caplet by mouth daily. , Disp: , Rfl:     omeprazole (PRILOSEC) 20 mg capsule, Take 1 capsule by mouth  daily, Disp: 90 Cap, Rfl: 3    simvastatin (ZOCOR) 20 mg tablet, Take 1 tablet by mouth  nightly, Disp: 90 Tab, Rfl: 3    warfarin (COUMADIN) 5 mg tablet, 1 to 1&1/2 tabs every day or as directed (Patient taking differently: Take 5 mg by mouth daily. 1 to 1&1/2 tabs every day or as directed  Pt states he take 7.5mg Monday and Friday and 5mg on all other days), Disp: 120 Tab, Rfl: 3    insulin aspart (NOVOLOG) 100 unit/mL injection, by SubCUTAneous route as needed. 6 am, lunch 4 ; 6 supper-plus few units by scale if high, Disp: , Rfl:     clobetasol (TEMOVATE) 0.05 % ointment, Apply  to affected area two (2) times a day., Disp: 15 g, Rfl: 12    levothyroxine (SYNTHROID) 125 mcg tablet, Take 1 Tab by mouth Daily (before breakfast). , Disp: 90 Tab, Rfl: 3    lisinopril (ZESTRIL) 2.5 mg tablet, Take 1 Tab by mouth daily. , Disp: 90 Tab, Rfl: 3    metOLazone (ZAROXOLYN) 2.5 mg tablet, Take 1 Tab by mouth as needed. , Disp: 90 Tab, Rfl: 1    lancets 33 gauge misc, Test BS TID/PRN Dx E11.9. Pt uses one touch delica 33 gauge lancets, Disp: 1 Package, Rfl: 12    Insulin Needles, Disposable, (BD INSULIN PEN NEEDLE UF MINI) 31 gauge x 3/16\" ndle, Use as directed, E11.9, Disp: 100 Pen Needle, Rfl: 12    lancets (ONETOUCH DELICA LANCETS) 30 gauge misc, Testing TID/PRN, E11.9, Disp: 1 Package, Rfl: 12    glucose blood VI test strips (ONETOUCH ULTRA TEST) strip, TEST BS TID AM DX E11.22, Disp: 100 Strip, Rfl: 12    aspirin delayed-release 81 mg tablet, Take 81 mg by mouth daily. , Disp: , Rfl:     multivitamin (ONE A DAY) tablet, Take 1 Tab by mouth daily. , Disp: , Rfl:     insulin glargine (LANTUS) 100 unit/mL injection, 16 Units by SubCUTAneous route. As directed , Disp: , Rfl:     ferrous sulfate (IRON) 325 mg (65 mg iron) tablet, Take 65 mg by mouth Daily (before breakfast). , Disp: , Rfl:    Date Last Reviewed:  3/21/2018   Number of Personal Factors/Comorbidities that affect the Plan of Care: 3+: HIGH COMPLEXITY   EXAMINATION:   Observation/Orthostatic Postural Assessment:          Pt is a large man, walks with rollator, sits comfortably on rollator. Has slight difficulty with sit to stand. Sits with poor posture; stands with slight trunk/ hip flexion. Slightly [place]. Palpation:          Some bilat lower leg swelling. Flat area palpable at superior aspect of flexed R knee ( distal quad atrophy?). Varicose veins prominent in R leg. ROM:            PROM Date:   12-5 Date:  1-11 Date:  2-12   R hip flex ~ 85 °    90+  90 + °    R hip abd ~ 25 °     35 °  35 °    R hip ER ~ 20 °  ~ 20 °  ~ 20-25 °          AROM:  R flex ~ 35 °   85 -90 °    R abd ~ 20 °   ~ 25-30 °              Strength:           Date:  12-5 Date:  1-11 Date:  2-12   R hip flex 3- 3- to 3 3+   R   Hip abd 2- to 2 2+ to 3- 3 to 3+    R knee extn 4- 4 4   R knee flex 3+ to 4- 4 4+    R DF 4           L hip flex 3+ 4    L knee extn 4 4+ to 5    L knee flex 4- 4    L DF 4-         Neurological Screen:        Sensation: Pt denies any sensory changes, denies peripheral neuropathy in LEs  Functional Mobility:         Gait/Ambulation:  ambs with rollator, shuffling gait, slight lean forward. Can walk short distances without walker ( eg, to the toilet). Pt reports that on good days, he can walk as far as about 4-5 blocks. Bed Mobility:  Pt reports that he needs some asst at home to get legs into bed. On the mat in PT, he needed very min asst for R leg. Stairs:  No stairs in the home. Balance:          NT on initial assessment. Body Structures Involved:  1. Nerves  2. Bones  3. Joints  4. Muscles  5. Ligaments Body Functions Affected:  1. Mental  2. Sensory/Pain  3. Neuromusculoskeletal  4. Movement Related Activities and Participation Affected:  1. General Tasks and Demands  2. Mobility  3.  Self Care  4. Domestic Life  5. Interpersonal Interactions and Relationships  6. Community, Social and [place]   Number of elements (examined above) that affect the Plan of Care: 3: MODERATE COMPLEXITY   CLINICAL PRESENTATION:   Presentation: Evolving clinical presentation with changing clinical characteristics: MODERATE COMPLEXITY   CLINICAL DECISION MAKING:   Outcome Measure: Tool Used: Lower Extremity Functional Scale (LEFS)  Score:  Initial: 25/80 Most Recent: 30/80 (Date: 3-21 )   Interpretation of Score: 20 questions each scored on a 5 point scale with 0 representing \"extreme difficulty or unable to perform\" and 4 representing \"no difficulty\". The lower the score, the greater the functional disability. 80/80 represents no disability. Minimal detectable change is 9 points. Score 80 79-63 62-48 47-32 31-16 15-1 0   Modifier CH CI CJ CK CL CM CN     ? Mobility - Walking and Moving Around:     - CURRENT STATUS: CK - 40%-59% impaired, limited or restricted    - GOAL STATUS: CK - 40%-59% impaired, limited or restricted    - D/C STATUS:  CL - 60%-79% impaired, limited or restricted      Medical Necessity:   · Skilled intervention continues to be required due to lack of hip ROM and strength, which severely impairs function. Reason for Services/Other Comments:  · Patient continues to demonstrate capacity to improve flexibility, ROM and function which will increase independence. Use of outcome tool(s) and clinical judgement create a POC that gives a: Questionable prediction of patient's progress: MODERATE COMPLEXITY            TREATMENT:   (In addition to Assessment/Re-Assessment sessions the following treatments were rendered)    Pre-treatment Symptoms/Complaints: Patient reports that he feels he is doing ok. Much better now since he has had the therapy. He would love to continue with Pt for a long time.       Pain: Initial: Pain Intensity 1: 2  Post Session:  2 to 1 reno     Therapeutic Exercise (45 minutes ):  To decrease pain, improve flexibility and motion, and increase strength. Will provide verbal and manual cues as needed to ensure proper performance of the exercises. Will increase range of motion, resistance and intensity as pt tolerates. Nustep x 10  min, level 4  Thorough stretching for hip abd, SLR, and ER, IR in supine---  Rotation remains essentially nil for IR, and severely limited for ER  Reviewed exercises for HEP:  Seated knee extn stretch, with self over-pressure  Lower trunk rotation--cues to try to keep shoulders down for better trunk stretch    SLR--2 x 12 each leg.  --- Control and quality is  [place] Now able t odo good ex with the R legr. Bridging --1 x 10---with difficulty due to lack of good knee flexion  Sidelying hip abd--1 x 10  Clam shell ex--1 x 15  Step ups on 6 \" step ---1 x  20  Lateral step downs from 6\"--1 x 15 each leg        [employer]  Treatment/Session Assessment:  Pt tolerated treatment with no discomfort. He now rarely uses his rollator to come to therapy and has resumed driving. Gait even without cues is better, with longer strides and less lateral motion. He really seems to enjoy coming to therapy; he does still exhibit moderate to severe stiffness in B hips and knees which is not expected to improve much. Discussed that regular exercise should help prevent any worsening of the stiffness or mobility. Apparently he has good compliance with home exercise; wife pushes him to walk more. Pt is in agreement with DC from PT today. Strongly encouraged to come here, or go to a Y for continued exercising and stretching. · Response to Treatment:    Stiffness does not seem to be changing, in the hips or knees. Standing flexion and extn are maybe some better. · Compliance with Program/Exercises:     seems to be compliant; He has returned to drving independently,  and walking better with less pain.   ·    Total Treatment Duration: 45 min  Total number of treatments:   22  PT Patient Time In/Time Out  Time In: 0100  Time Out: 0145    Price Nance, PT,

## 2018-04-16 ENCOUNTER — APPOINTMENT (RX ONLY)
Dept: URBAN - METROPOLITAN AREA CLINIC 349 | Facility: CLINIC | Age: 83
Setting detail: DERMATOLOGY
End: 2018-04-16

## 2018-04-16 DIAGNOSIS — Z85.828 PERSONAL HISTORY OF OTHER MALIGNANT NEOPLASM OF SKIN: ICD-10-CM

## 2018-04-16 DIAGNOSIS — B02.9 ZOSTER WITHOUT COMPLICATIONS: ICD-10-CM

## 2018-04-16 DIAGNOSIS — L57.0 ACTINIC KERATOSIS: ICD-10-CM

## 2018-04-16 PROBLEM — I48.91 UNSPECIFIED ATRIAL FIBRILLATION: Status: ACTIVE | Noted: 2018-04-16

## 2018-04-16 PROCEDURE — ? LIQUID NITROGEN

## 2018-04-16 PROCEDURE — 99213 OFFICE O/P EST LOW 20 MIN: CPT | Mod: 25

## 2018-04-16 PROCEDURE — 17003 DESTRUCT PREMALG LES 2-14: CPT

## 2018-04-16 PROCEDURE — ? PRESCRIPTION

## 2018-04-16 PROCEDURE — ? TREATMENT REGIMEN

## 2018-04-16 PROCEDURE — 17000 DESTRUCT PREMALG LESION: CPT

## 2018-04-16 PROCEDURE — ? COUNSELING

## 2018-04-16 RX ORDER — VALACYCLOVIR HYDROCHLORIDE 1 G/1
TABLET, FILM COATED ORAL
Qty: 30 | Refills: 0 | Status: ERX | COMMUNITY
Start: 2018-04-16

## 2018-04-16 RX ADMIN — VALACYCLOVIR HYDROCHLORIDE: 1 TABLET, FILM COATED ORAL at 00:00

## 2018-04-16 ASSESSMENT — LOCATION SIMPLE DESCRIPTION DERM
LOCATION SIMPLE: LEFT EAR
LOCATION SIMPLE: RIGHT OCCIPITAL SCALP
LOCATION SIMPLE: SCALP
LOCATION SIMPLE: LEFT OCCIPITAL SCALP
LOCATION SIMPLE: LEFT TEMPLE
LOCATION SIMPLE: CHEST

## 2018-04-16 ASSESSMENT — LOCATION DETAILED DESCRIPTION DERM
LOCATION DETAILED: RIGHT SUPERIOR OCCIPITAL SCALP
LOCATION DETAILED: LEFT CENTRAL TEMPLE
LOCATION DETAILED: LEFT SUPERIOR HELIX
LOCATION DETAILED: STERNUM
LOCATION DETAILED: RIGHT SUPERIOR PARIETAL SCALP
LOCATION DETAILED: LEFT SUPERIOR PARIETAL SCALP
LOCATION DETAILED: LEFT SUPERIOR OCCIPITAL SCALP
LOCATION DETAILED: LEFT INFERIOR FRONTAL SCALP

## 2018-04-16 ASSESSMENT — LOCATION ZONE DERM
LOCATION ZONE: SCALP
LOCATION ZONE: EAR
LOCATION ZONE: TRUNK
LOCATION ZONE: FACE

## 2018-04-16 ASSESSMENT — PAIN INTENSITY VAS: HOW INTENSE IS YOUR PAIN 0 BEING NO PAIN, 10 BEING THE MOST SEVERE PAIN POSSIBLE?: NO PAIN

## 2018-04-16 NOTE — PROCEDURE: LIQUID NITROGEN
Consent: The patient's consent was obtained including but not limited to risks of crusting, scabbing, blistering, scarring, darker or lighter pigmentary change, recurrence, incomplete removal and infection.
Detail Level: Zone
Render Post-Care Instructions In Note?: no
Post-Care Instructions: I reviewed with the patient in detail post-care instructions. Patient is to wear sunprotection, and avoid picking at any of the treated lesions. Pt may apply Vaseline to crusted or scabbing areas.
Duration Of Freeze Thaw-Cycle (Seconds): 3
Number Of Freeze-Thaw Cycles: 2 freeze-thaw cycles

## 2018-04-16 NOTE — PROCEDURE: TREATMENT REGIMEN
Detail Level: Zone
Initiate Treatment: Take 1   100 mg tablet of Valacyclovir three times daily for 10 days

## 2018-04-30 ENCOUNTER — APPOINTMENT (RX ONLY)
Dept: URBAN - METROPOLITAN AREA CLINIC 349 | Facility: CLINIC | Age: 83
Setting detail: DERMATOLOGY
End: 2018-04-30

## 2018-04-30 DIAGNOSIS — L57.0 ACTINIC KERATOSIS: ICD-10-CM

## 2018-04-30 DIAGNOSIS — B02.9 ZOSTER WITHOUT COMPLICATIONS: ICD-10-CM | Status: RESOLVED

## 2018-04-30 PROBLEM — I25.10 ATHEROSCLEROTIC HEART DISEASE OF NATIVE CORONARY ARTERY WITHOUT ANGINA PECTORIS: Status: ACTIVE | Noted: 2018-04-30

## 2018-04-30 PROCEDURE — ? COUNSELING

## 2018-04-30 PROCEDURE — ? PRESCRIPTION

## 2018-04-30 PROCEDURE — ? OTHER

## 2018-04-30 PROCEDURE — 99213 OFFICE O/P EST LOW 20 MIN: CPT | Mod: 25

## 2018-04-30 PROCEDURE — ? LIQUID NITROGEN

## 2018-04-30 PROCEDURE — 17000 DESTRUCT PREMALG LESION: CPT

## 2018-04-30 PROCEDURE — 17003 DESTRUCT PREMALG LES 2-14: CPT

## 2018-04-30 PROCEDURE — ? RECOMMENDATIONS

## 2018-04-30 RX ORDER — INGENOL MEBUTATE 150 UG/G
GEL TOPICAL
Qty: 1 | Refills: 1 | Status: ERX | COMMUNITY
Start: 2018-04-30

## 2018-04-30 RX ADMIN — INGENOL MEBUTATE: 150 GEL TOPICAL at 00:00

## 2018-04-30 ASSESSMENT — LOCATION SIMPLE DESCRIPTION DERM
LOCATION SIMPLE: LEFT HAND
LOCATION SIMPLE: LEFT FOREARM
LOCATION SIMPLE: RIGHT FOREARM

## 2018-04-30 ASSESSMENT — LOCATION DETAILED DESCRIPTION DERM
LOCATION DETAILED: LEFT DISTAL DORSAL FOREARM
LOCATION DETAILED: LEFT PROXIMAL DORSAL FOREARM
LOCATION DETAILED: LEFT RADIAL DORSAL HAND
LOCATION DETAILED: RIGHT DISTAL DORSAL FOREARM

## 2018-04-30 ASSESSMENT — LOCATION ZONE DERM
LOCATION ZONE: HAND
LOCATION ZONE: ARM

## 2018-04-30 NOTE — PROCEDURE: RECOMMENDATIONS
Recommendation Preamble: The following recommendations were made during the visit:
Recommendations (Free Text): Because of widespread actinic keratosis on scalp and forehead, Picato cream daily x 3 days.
Detail Level: Zone

## 2018-04-30 NOTE — PROCEDURE: OTHER
Detail Level: Zone
Note Text (......Xxx Chief Complaint.): This diagnosis correlates with the
Other (Free Text): Dr. rhodes initiated and approved plan.

## 2018-05-01 PROBLEM — Z79.01 LONG TERM (CURRENT) USE OF ANTICOAGULANTS: Status: ACTIVE | Noted: 2018-05-01

## 2018-05-30 ENCOUNTER — RX ONLY (OUTPATIENT)
Age: 83
Setting detail: RX ONLY
End: 2018-05-30

## 2018-05-30 RX ORDER — INGENOL MEBUTATE 150 UG/G
GEL TOPICAL
Qty: 1 | Refills: 0 | Status: ERX

## 2018-06-14 ENCOUNTER — APPOINTMENT (RX ONLY)
Dept: URBAN - METROPOLITAN AREA CLINIC 349 | Facility: CLINIC | Age: 83
Setting detail: DERMATOLOGY
End: 2018-06-14

## 2018-06-14 DIAGNOSIS — L57.0 ACTINIC KERATOSIS: ICD-10-CM | Status: IMPROVED

## 2018-06-14 DIAGNOSIS — L57.8 OTHER SKIN CHANGES DUE TO CHRONIC EXPOSURE TO NONIONIZING RADIATION: ICD-10-CM

## 2018-06-14 PROCEDURE — ? LIQUID NITROGEN

## 2018-06-14 PROCEDURE — 17004 DESTROY PREMAL LESIONS 15/>: CPT

## 2018-06-14 PROCEDURE — ? OTHER

## 2018-06-14 PROCEDURE — ? COUNSELING

## 2018-06-14 PROCEDURE — 99213 OFFICE O/P EST LOW 20 MIN: CPT | Mod: 25

## 2018-06-14 ASSESSMENT — LOCATION SIMPLE DESCRIPTION DERM
LOCATION SIMPLE: RIGHT TEMPLE
LOCATION SIMPLE: LEFT FOREHEAD
LOCATION SIMPLE: SUPERIOR FOREHEAD
LOCATION SIMPLE: RIGHT FOREHEAD
LOCATION SIMPLE: LEFT OCCIPITAL SCALP
LOCATION SIMPLE: LEFT SCALP
LOCATION SIMPLE: CHIN
LOCATION SIMPLE: GLABELLA
LOCATION SIMPLE: RIGHT SCALP
LOCATION SIMPLE: LEFT CHEEK
LOCATION SIMPLE: LEFT TEMPLE
LOCATION SIMPLE: POSTERIOR SCALP
LOCATION SIMPLE: RIGHT EYEBROW
LOCATION SIMPLE: RIGHT CHEEK
LOCATION SIMPLE: LEFT NOSE
LOCATION SIMPLE: SCALP

## 2018-06-14 ASSESSMENT — LOCATION DETAILED DESCRIPTION DERM
LOCATION DETAILED: RIGHT CENTRAL EYEBROW
LOCATION DETAILED: LEFT CENTRAL MALAR CHEEK
LOCATION DETAILED: GLABELLA
LOCATION DETAILED: LEFT SUPERIOR MEDIAL FOREHEAD
LOCATION DETAILED: LEFT NASAL SIDEWALL
LOCATION DETAILED: RIGHT INFERIOR FOREHEAD
LOCATION DETAILED: RIGHT MID PREAURICULAR CHEEK
LOCATION DETAILED: LEFT MEDIAL FOREHEAD
LOCATION DETAILED: LEFT SUPERIOR FOREHEAD
LOCATION DETAILED: MID-OCCIPITAL SCALP
LOCATION DETAILED: RIGHT MEDIAL FRONTAL SCALP
LOCATION DETAILED: RIGHT FOREHEAD
LOCATION DETAILED: RIGHT CENTRAL TEMPLE
LOCATION DETAILED: LEFT LATERAL TEMPLE
LOCATION DETAILED: LEFT FOREHEAD
LOCATION DETAILED: LEFT SUPERIOR PARIETAL SCALP
LOCATION DETAILED: SUPERIOR MID FOREHEAD
LOCATION DETAILED: RIGHT SUPERIOR MEDIAL FOREHEAD
LOCATION DETAILED: LEFT MEDIAL FRONTAL SCALP
LOCATION DETAILED: RIGHT SUPERIOR FRONTAL SCALP
LOCATION DETAILED: RIGHT CENTRAL MALAR CHEEK
LOCATION DETAILED: LEFT SUPERIOR OCCIPITAL SCALP
LOCATION DETAILED: RIGHT CHIN

## 2018-06-14 ASSESSMENT — LOCATION ZONE DERM
LOCATION ZONE: SCALP
LOCATION ZONE: FACE
LOCATION ZONE: NOSE

## 2018-06-14 ASSESSMENT — PAIN INTENSITY VAS: HOW INTENSE IS YOUR PAIN 0 BEING NO PAIN, 10 BEING THE MOST SEVERE PAIN POSSIBLE?: NO PAIN

## 2018-06-14 NOTE — PROCEDURE: OTHER
Other (Free Text): Pt completed Picato treatment.  Appeared to have a good reaction but some residual AKs present. Will treat with cryotherapy. Discussed Levulan treatment in the fall.  Pt started that due to health issues, he would not be able to sit under blue light that long
Detail Level: Zone
Note Text (......Xxx Chief Complaint.): This diagnosis correlates with the

## 2018-07-02 PROBLEM — I25.10 CHRONIC CORONARY ARTERY DISEASE: Status: ACTIVE | Noted: 2017-08-29

## 2018-07-02 PROBLEM — M19.90 ARTHRITIS: Status: ACTIVE | Noted: 2017-08-29

## 2018-08-01 PROBLEM — D64.9 ANEMIA: Status: ACTIVE | Noted: 2018-08-01

## 2018-08-03 ENCOUNTER — APPOINTMENT (RX ONLY)
Dept: URBAN - METROPOLITAN AREA CLINIC 349 | Facility: CLINIC | Age: 83
Setting detail: DERMATOLOGY
End: 2018-08-03

## 2018-08-03 DIAGNOSIS — Z71.89 OTHER SPECIFIED COUNSELING: ICD-10-CM

## 2018-08-03 DIAGNOSIS — Z85.828 PERSONAL HISTORY OF OTHER MALIGNANT NEOPLASM OF SKIN: ICD-10-CM

## 2018-08-03 DIAGNOSIS — L57.0 ACTINIC KERATOSIS: ICD-10-CM

## 2018-08-03 PROBLEM — C44.519 BASAL CELL CARCINOMA OF SKIN OF OTHER PART OF TRUNK: Status: ACTIVE | Noted: 2018-08-03

## 2018-08-03 PROBLEM — C44.41 BASAL CELL CARCINOMA OF SKIN OF SCALP AND NECK: Status: ACTIVE | Noted: 2018-08-03

## 2018-08-03 PROBLEM — L85.3 XEROSIS CUTIS: Status: ACTIVE | Noted: 2018-08-03

## 2018-08-03 PROBLEM — M12.9 ARTHROPATHY, UNSPECIFIED: Status: ACTIVE | Noted: 2018-08-03

## 2018-08-03 PROBLEM — C44.619 BASAL CELL CARCINOMA OF SKIN OF LEFT UPPER LIMB, INCLUDING SHOULDER: Status: ACTIVE | Noted: 2018-08-03

## 2018-08-03 PROCEDURE — ? PRESCRIPTION

## 2018-08-03 PROCEDURE — ? PATHOLOGY BILLING

## 2018-08-03 PROCEDURE — 17271 DSTR MAL LES S/N/H/F/G 0.6-1: CPT | Mod: 59

## 2018-08-03 PROCEDURE — A4550 SURGICAL TRAYS: HCPCS

## 2018-08-03 PROCEDURE — 17000 DESTRUCT PREMALG LESION: CPT

## 2018-08-03 PROCEDURE — ? SHAVE REMOVAL AND DESTRUCTION

## 2018-08-03 PROCEDURE — 99213 OFFICE O/P EST LOW 20 MIN: CPT | Mod: 25

## 2018-08-03 PROCEDURE — 17261 DSTRJ MAL LES T/A/L .6-1.0CM: CPT | Mod: 59

## 2018-08-03 PROCEDURE — ? TREATMENT REGIMEN

## 2018-08-03 PROCEDURE — 88305 TISSUE EXAM BY PATHOLOGIST: CPT

## 2018-08-03 PROCEDURE — ? LIQUID NITROGEN

## 2018-08-03 PROCEDURE — ? COUNSELING

## 2018-08-03 PROCEDURE — 17003 DESTRUCT PREMALG LES 2-14: CPT

## 2018-08-03 RX ORDER — FLUOROURACIL 50 MG/G
CREAM TOPICAL
Qty: 1 | Refills: 1

## 2018-08-03 ASSESSMENT — LOCATION ZONE DERM
LOCATION ZONE: FACE
LOCATION ZONE: SCALP
LOCATION ZONE: TRUNK

## 2018-08-03 ASSESSMENT — LOCATION SIMPLE DESCRIPTION DERM
LOCATION SIMPLE: CHEST
LOCATION SIMPLE: RIGHT FOREHEAD
LOCATION SIMPLE: LEFT CHEEK
LOCATION SIMPLE: LEFT FOREHEAD
LOCATION SIMPLE: RIGHT TEMPLE
LOCATION SIMPLE: RIGHT OCCIPITAL SCALP
LOCATION SIMPLE: POSTERIOR SCALP
LOCATION SIMPLE: LEFT OCCIPITAL SCALP

## 2018-08-03 ASSESSMENT — LOCATION DETAILED DESCRIPTION DERM
LOCATION DETAILED: LEFT MEDIAL SUPERIOR CHEST
LOCATION DETAILED: LEFT SUPERIOR OCCIPITAL SCALP
LOCATION DETAILED: RIGHT SUPERIOR POSTERIOR PARIETAL SCALP
LOCATION DETAILED: LEFT FOREHEAD
LOCATION DETAILED: MID-OCCIPITAL SCALP
LOCATION DETAILED: RIGHT SUPERIOR OCCIPITAL SCALP
LOCATION DETAILED: RIGHT FOREHEAD
LOCATION DETAILED: LEFT MEDIAL INFERIOR CHEST
LOCATION DETAILED: LEFT CENTRAL MALAR CHEEK
LOCATION DETAILED: RIGHT CENTRAL TEMPLE

## 2018-08-03 ASSESSMENT — PAIN INTENSITY VAS: HOW INTENSE IS YOUR PAIN 0 BEING NO PAIN, 10 BEING THE MOST SEVERE PAIN POSSIBLE?: 1/10 PAIN

## 2018-08-03 NOTE — PROCEDURE: PATHOLOGY BILLING
Immunohistochemistry ([id number] and [id number]) billing is not performed here. Please use the Immunohistochemistry Stain Billing plan to accomplish this. Immunohistochemistry ([id number] and [id number]) billing is not performed here. Please use the Immunohistochemistry Stain Billing plan to accomplish this.

## 2018-08-03 NOTE — PROCEDURE: LIQUID NITROGEN
Consent: The patient's consent was obtained including but not limited to risks of crusting, scabbing, blistering, scarring, darker or lighter pigmentary change, recurrence, incomplete removal and infection.
Detail Level: Zone
Render Post-Care Instructions In Note?: no
Duration Of Freeze Thaw-Cycle (Seconds): 3
Number Of Freeze-Thaw Cycles: 2 freeze-thaw cycles
Post-Care Instructions: I reviewed with the patient in detail post-care instructions. Patient is to wear sunprotection, and avoid picking at any of the treated lesions. Pt may apply Vaseline to crusted or scabbing areas.

## 2018-08-03 NOTE — PROCEDURE: SHAVE REMOVAL AND DESTRUCTION
Hemostasis: Electrocautery
Wound Care: Vaseline
Anesthesia Volume In Cc: 0.3
Render Post-Care Instructions In Note?: yes
Anesthesia Type: 1% lidocaine with epinephrine
Accession #: Dr An read
Dressing: Band-Aid
Billing Type: Third-Party Bill
Accession #: Dr An read
Bill 63651 For Specimen Handling/Conveyance To Laboratory?: no
Post-Care Instructions: I reviewed with the patient in detail post-care instructions. Patient is to keep the biopsy site dry overnight, and then apply bacitracin twice daily until healed. Patient may apply hydrogen peroxide soaks to remove any crusting.  After the procedure, the patient was observed for 5-10 minutes and was oriented to,person, place and time and denied feeling dizzy, queasy and stated that they were not going to faint
Notification Instructions: Patient will be notified of biopsy results. However, patient instructed to call the office if not contacted within 2 weeks.
Bill As?: Note: Bill Malignant Destruction If Path Confirms Malignant Lesion. Only Bill As Shave Removal If Path Comes Back Benign. Do Not Bill Shave Removal On Malignant Lesions.: Malignant Destruction
Size Of Lesion In Cm: 0.6
Consent: Written consent was obtained and risks were reviewed including but not limited to scarring, infection, bleeding, scabbing, incomplete removal, nerve damage and allergy to anesthesia.
Number Of Curettages: 2
Cautery Type: electrodesiccation
Detail Level: Simple
Size After Destruction (Required For Destruction Billing): 0.7
Anesthesia Volume In Cc: 0

## 2018-08-03 NOTE — PROCEDURE: PATHOLOGY BILLING
Immunohistochemistry ([id number] and [id number]) billing is not performed here. Please use the Immunohistochemistry Stain Billing plan to accomplish this.

## 2018-08-09 ENCOUNTER — APPOINTMENT (RX ONLY)
Dept: URBAN - METROPOLITAN AREA CLINIC 349 | Facility: CLINIC | Age: 83
Setting detail: DERMATOLOGY
End: 2018-08-09

## 2018-08-09 DIAGNOSIS — Z48.817 ENCOUNTER FOR SURGICAL AFTERCARE FOLLOWING SURGERY ON THE SKIN AND SUBCUTANEOUS TISSUE: ICD-10-CM

## 2018-08-09 DIAGNOSIS — L259 CONTACT DERMATITIS AND OTHER ECZEMA, UNSPECIFIED CAUSE: ICD-10-CM

## 2018-08-09 PROBLEM — L23.9 ALLERGIC CONTACT DERMATITIS, UNSPECIFIED CAUSE: Status: ACTIVE | Noted: 2018-08-09

## 2018-08-09 PROCEDURE — 99213 OFFICE O/P EST LOW 20 MIN: CPT | Mod: 24

## 2018-08-09 PROCEDURE — ? DRESSING CHANGE

## 2018-08-09 PROCEDURE — ? TREATMENT REGIMEN

## 2018-08-09 PROCEDURE — ? COUNSELING

## 2018-08-09 ASSESSMENT — LOCATION ZONE DERM
LOCATION ZONE: SCALP
LOCATION ZONE: TRUNK
LOCATION ZONE: TRUNK
LOCATION ZONE: SCALP

## 2018-08-09 ASSESSMENT — LOCATION DETAILED DESCRIPTION DERM
LOCATION DETAILED: LEFT SUPERIOR UPPER BACK
LOCATION DETAILED: SUPERIOR THORACIC SPINE
LOCATION DETAILED: LEFT SUPERIOR OCCIPITAL SCALP
LOCATION DETAILED: LEFT SUPERIOR MEDIAL UPPER BACK
LOCATION DETAILED: LEFT SUPERIOR LATERAL UPPER BACK
LOCATION DETAILED: LEFT SUPERIOR OCCIPITAL SCALP
LOCATION DETAILED: SUPERIOR THORACIC SPINE
LOCATION DETAILED: LEFT SUPERIOR UPPER BACK

## 2018-08-09 ASSESSMENT — LOCATION SIMPLE DESCRIPTION DERM
LOCATION SIMPLE: POSTERIOR SCALP
LOCATION SIMPLE: LEFT UPPER BACK
LOCATION SIMPLE: POSTERIOR SCALP
LOCATION SIMPLE: UPPER BACK
LOCATION SIMPLE: LEFT UPPER BACK
LOCATION SIMPLE: UPPER BACK

## 2018-08-09 NOTE — PROCEDURE: DRESSING CHANGE
Add [id number] Cpt? (Important Note: In 2017 The Use Of 30387 Is Being Tracked By Cms To Determine Future Global Period Reimbursement For Global Periods): yes
Wound Evaluated By: Site was cleansed with peroxide and water, Vaseline, [place] and hypfix applied
Detail Level: Detailed

## 2018-09-21 ENCOUNTER — HOSPITAL ENCOUNTER (OUTPATIENT)
Dept: WOUND CARE | Age: 83
Discharge: HOME | End: 2018-09-21
Attending: SURGERY
Payer: MEDICARE

## 2018-09-21 VITALS
HEIGHT: 72 IN | DIASTOLIC BLOOD PRESSURE: 69 MMHG | RESPIRATION RATE: 18 BRPM | TEMPERATURE: 98.2 F | HEART RATE: 65 BPM | SYSTOLIC BLOOD PRESSURE: 123 MMHG | OXYGEN SATURATION: 99 %

## 2018-09-21 PROBLEM — L97.212 NON-PRESSURE CHRONIC ULCER OF RIGHT CALF WITH FAT LAYER EXPOSED (HCC): Status: ACTIVE | Noted: 2018-09-21

## 2018-09-21 PROBLEM — E11.622 DIABETES MELLITUS WITH SKIN ULCER (HCC): Status: ACTIVE | Noted: 2018-09-21

## 2018-09-21 PROBLEM — L98.499 DIABETES MELLITUS WITH SKIN ULCER (HCC): Status: ACTIVE | Noted: 2018-09-21

## 2018-09-21 PROCEDURE — 77030035128 HC DRSG ANTIMIC FOAM HYDROFERA HOLL -A

## 2018-09-21 PROCEDURE — 99214 OFFICE O/P EST MOD 30 MIN: CPT

## 2018-09-21 PROCEDURE — 29581 APPL MULTLAYER CMPRN SYS LEG: CPT

## 2018-09-21 NOTE — PROGRESS NOTES
Wound Center Progress Note    Patient: Oneida Garcia MRN: [id number]  SSN: [id number]    YOB: 1935  Age: 81 y.o.   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  Associated Signs and Symptoms: some drainage, pain  Timing: constant since ~ aug 2018  Quality: wound  Severity: full thickness  Modifying Factors: dm2    Blood sugars checked regularly          Past Medical History:   Diagnosis Date    Arthritis     Atrial fibrillation (HCC)     chronic    CAD (coronary artery disease)     Cancer (HCC)     skin    DM (diabetes mellitus), type 2 ([address]     Family history of prostate problems     GERD (gastroesophageal reflux disease)     Hearing difficulty     HLD (hyperlipidemia)     Hypertension     Hypothyroidism     Insulin dependent diabetes mellitus ([address]     Kidney problem     Systolic heart failure ([address]       Past Surgical History:   Procedure Laterality Date    CARDIAC SURG PROCEDURE UNLIST  06/2007    Triple By pass    CARDIAC SURG PROCEDURE UNLIST  11/14/2011    Pacemaker    CARDIAC SURG PROCEDURE UNLIST  05/10/2013    Bi-ventricular defibrillator    HX AORTIC VALVE REPLACEMENT  11/10/2011    HX APPENDECTOMY  08/2004    HX CATARACT REMOVAL  04/2011    HX CORONARY ARTERY BYPASS GRAFT      HX HERNIA REPAIR  08/2004    HX KNEE REPLACEMENT  04/25/2016    right    HX ORTHOPAEDIC  1973    [place]    HX OTHER SURGICAL      prostate surgery     HX OTHER SURGICAL      bi-ventricular pacemaker     HX PROSTATECTOMY  03/01/2006    HX TONSILLECTOMY       Family History   Problem Relation Age of Onset    Other Mother      mitral valve disease     Heart Failure Father     No Known Problems Sister       Social History   Substance Use Topics    Smoking status: Never Smoker    Smokeless tobacco: Never Used    Alcohol use No      Comment: occ wine and beer        Prior to Admission medications    Medication Sig Start Date End Date Taking? Authorizing Provider   Insulin Needles, Disposable, (BD ULTRA-FINE MINI PEN NEEDLE) 31 gauge x 3/16\" ndle Testing TID as directed, E11.9 8/21/18   W Mary Hayden MD   lancets 33 gauge misc Test BS TID/PRN Dx E11.9 8/21/18   Saman Cabral MD   pen needle, diabetic 33 gauge x 3/16\" ndle Test bs TID/PRN Dx E11.9 pt uses easy fine one touch delica 9/52/17   Elva De La Rosa, NP   ONETOUCH ULTRA BLUE TEST STRIP strip USE TO TEST THREE TIMES DAILY AND AS NEEDED 6/19/18   Saman Cabral MD   lisinopril (PRINIVIL, ZESTRIL) 2.5 mg tablet TAKE 1 TABLET BY MOUTH  DAILY 4/1/18   Saman Cabral MD   levothyroxine (SYNTHROID) 125 mcg tablet TAKE 1 TABLET BY MOUTH  DAILY BEFORE BREAKFAST 4/1/18   Saman Cabral MD   furosemide (LASIX) 40 mg tablet TAKE 1 TABLET BY MOUTH TWO  TIMES DAILY 12/18/17   Saman Cabral MD   carvedilol (COREG) 6.25 mg tablet TAKE 1 TABLET BY MOUTH TWO  TIMES DAILY WITH MEALS 12/18/17   Saman Cabral MD   potassium chloride (K-DUR, KLOR-CON) 20 mEq tablet Take 1 Tab by mouth daily. 12/4/17   Saman Cabral MD   tamsulosin (FLOMAX) 0.4 mg capsule Take 1 Cap by mouth daily. 12/4/17   Saman Cabral MD   omeprazole (PRILOSEC) 20 mg capsule Take 1 capsule by mouth  daily 9/17/17   Saman Cabral MD   simvastatin (ZOCOR) 20 mg tablet Take 1 tablet by mouth  nightly 9/17/17   Saman Cabral MD   warfarin (COUMADIN) 5 mg tablet 1 to 1&1/2 tabs every day or as directed  Patient taking differently: Take 5 mg by mouth daily. 1 to 1&1/2 tabs every day or as directed    Pt states he take 7.5mg Monday and Friday and 5mg on all other days   BRAND ONLY 9/13/17   Dashawn Garrison MD   insulin aspart (NOVOLOG) 100 unit/mL injection by SubCUTAneous route as needed.  6 am, lunch 4 ; 6 supper-plus few units by scale if high    Historical Provider   metOLazone (ZAROXOLYN) 2.5 mg tablet Take 1 Tab by mouth as needed. Patient taking differently: Take 2.5 mg by mouth two (2) times a week. 6/5/17   RANDY Morales   aspirin delayed-release 81 mg tablet Take 81 mg by mouth daily. Historical Provider   multivitamin (ONE A DAY) tablet Take 1 Tab by mouth daily. Historical Provider   insulin glargine (LANTUS) 100 unit/mL injection 16 Units by SubCUTAneous route. As directed     Historical Provider   ferrous sulfate (IRON) 325 mg (65 mg iron) tablet Take 65 mg by mouth Daily (before breakfast). Historical Provider     Allergies   Allergen Reactions    Latex Rash    Adhesive Rash        Review of Systems:  CONSTITUTIONAL: No fever, chills  HEAD: No headache  EYES: + visual loss  ENT: + hearing loss  SKIN: No rash  CARDIOVASCULAR: No chest pain. + afib  RESPIRATORY: No shortness of breath  GASTROINTESTINAL: No nausea, vomiting  GENITOURINARY: No excessive urination  NEUROLOGICAL: No weakness  MUSCULOSKELETAL: No muscle pain. No neck pain  HEMATOLOGIC: No easy bleeding  LYMPHATICS: No lymphedema. PSYCHIATRIC: No current depression  ENDOCRINOLOGIC: No high sugars  ALLERGIES: No history of asthma, hives, eczema or rhinitis. Lab Results   Component Value Date/Time    Hemoglobin A1c 6.7 (H) 03/23/2018 10:26 AM        Immunization History   Administered Date(s) Administered    Influenza High Dose Vaccine PF 09/15/2016, 09/08/2017    Pneumococcal Conjugate (PCV-13) 04/02/2015    Pneumococcal Polysaccharide (PPSV-23) 04/22/2016    Tdap 05/22/2013    Zoster Vaccine, Live 01/22/2011       There is no height or weight on file to calculate BMI.       Current medications:  Current Outpatient Prescriptions   Medication Sig Dispense Refill    Insulin Needles, Disposable, (BD ULTRA-FINE MINI PEN NEEDLE) 31 gauge x 3/16\" ndle Testing TID as directed, E11.9 100 Pen Needle 12    lancets 33 gauge misc Test BS TID/PRN Dx E11.9 2 Package 12    pen needle, diabetic 33 gauge x 3/16\" ndle Test bs TID/PRN Dx E11.9 pt uses easy fine one touch delica 210 Pen Needle 12    ONETOUCH ULTRA BLUE TEST STRIP strip USE TO TEST THREE TIMES DAILY AND AS NEEDED 300 Strip 4    lisinopril (PRINIVIL, ZESTRIL) 2.5 mg tablet TAKE 1 TABLET BY MOUTH  DAILY 90 Tab 4    levothyroxine (SYNTHROID) 125 mcg tablet TAKE 1 TABLET BY MOUTH  DAILY BEFORE BREAKFAST 90 Tab 4    furosemide (LASIX) 40 mg tablet TAKE 1 TABLET BY MOUTH TWO  TIMES DAILY 180 Tab 3    carvedilol (COREG) 6.25 mg tablet TAKE 1 TABLET BY MOUTH TWO  TIMES DAILY WITH MEALS 180 Tab 3    potassium chloride (K-DUR, KLOR-CON) 20 mEq tablet Take 1 Tab by mouth daily. 90 Tab 3    tamsulosin (FLOMAX) 0.4 mg capsule Take 1 Cap by mouth daily. 90 Cap 3    omeprazole (PRILOSEC) 20 mg capsule Take 1 capsule by mouth  daily 90 Cap 3    simvastatin (ZOCOR) 20 mg tablet Take 1 tablet by mouth  nightly 90 Tab 3    warfarin (COUMADIN) 5 mg tablet 1 to 1&1/2 tabs every day or as directed (Patient taking differently: Take 5 mg by mouth daily. 1 to 1&1/2 tabs every day or as directed    Pt states he take 7.5mg Monday and Friday and 5mg on all other days   BRAND ONLY) 120 Tab 3    insulin aspart (NOVOLOG) 100 unit/mL injection by SubCUTAneous route as needed. 6 am, lunch 4 ; 6 supper-plus few units by scale if high      metOLazone (ZAROXOLYN) 2.5 mg tablet Take 1 Tab by mouth as needed. (Patient taking differently: Take 2.5 mg by mouth two (2) times a week.) 90 Tab 1    aspirin delayed-release 81 mg tablet Take 81 mg by mouth daily.  multivitamin (ONE A DAY) tablet Take 1 Tab by mouth daily.  insulin glargine (LANTUS) 100 unit/mL injection 16 Units by SubCUTAneous route. As directed       ferrous sulfate (IRON) 325 mg (65 mg iron) tablet Take 65 mg by mouth Daily (before breakfast).            Objective:     Physical Exam:     Visit Vitals    /69 (BP 1 Location: Right arm)    Pulse 65    Temp 98.2 °F (36.8 °C)    Resp 18    Ht 6' (1.829 m)    SpO2 99%       General: well developed, well nourished  Psych: cooperative. Pleasant  Neuro: alert and oriented to person/place/situation. Otherwise nonfocal.  Derm: Normal turgor for age, dry skin  HEENT: Normocephalic, atraumatic. EOMI. Neck: Normal range of motion. Chest: Respirations nonlabored  Cardio: irreg irreg  Abdomen: Soft, nondistended  Lower extremities:   Bilateral hemosidderosis  Significant varicosities L>R  Capillary refill <3 sec    Right  2+ DP/PT    Left  2+ DP/PT    Ulcer Description:   Wound Right; Anterior (Active)   Wound Length (cm) 0.9 cm 9/21/2018  1:38 PM   Wound Width (cm) 0.8 cm 9/21/2018  1:38 PM   Wound Depth (cm) 0.1 9/21/2018  1:38 PM   Wound Surface area (cm^2) 0.72 cm^2 9/21/2018  1:38 PM   Tissue Type Percent Red 100 9/21/2018  1:38 PM   Drainage Amount  None 9/21/2018  1:38 PM   Wound Odor None 9/21/2018  1:38 PM   Periwound Skin Condition Intact 9/21/2018  1:38 PM   Cleansing and Cleansing Agents  Normal saline 9/21/2018  1:38 PM   Procedure Tolerated Well 9/21/2018  1:38 PM   Number of days:0                         Assessment/Plan:     BLE venous insufficiency with right leg ulcer, no sign of cellulitis.  Will start multilayer compression and hydrafera    Signed By: Edelmira Abad MD     September 21, 2018

## 2018-09-21 NOTE — WOUND CARE
[address]  [address], [address]  Phone: [phone]  Fax: [phone]    Patient: Shaila Richardson MRN: [id number]  SSN: [id number]    YOB: 1935  Age: 81 y.o. Sex: male       Return Appointment: 1 week with Elicia Rutherford MD   Return on Tuesday 9.25.18 for Appointment with Clinician for Dressing Change    Instructions: Cleanse with Normal Saline. Apply Hydrofera Ready to Wound, Cover with ABD, Wrap with Coban 2. Change 2x/week. Change on Tuesday 9.25.18 at [facility]. Return Appointment in 1 Week with Dr Elicia Rutherford. Should you experience increased redness, swelling, pain, foul odor, size of wound(s), or have a temperature over 101 degrees please contact the [address] [place] [address] Road at [phone] or if after hours contact your primary care physician or go to the hospital emergency department.     Signed By: Angela Orellana RN     September 21, 2018

## 2018-09-21 NOTE — WOUND CARE
09/21/18 1338   Wound Right; Anterior   Date First Assessed: 09/21/18   POA: Yes  Wound Type: Venous  Wound Description (Optional): #1  Orientation: Right; Anterior   DRESSING STATUS (No Dressing)   DRESSING TYPE (No Dressing)   Wound Length (cm) 0.9 cm   Wound Width (cm) 0.8 cm   Wound Depth (cm) 0.1   Wound Surface area (cm^2) 0.72 cm^2   Tissue Type Percent Red 100   Drainage Amount  None   Wound Odor None   Periwound Skin Condition Intact   Cleansing and Cleansing Agents  Normal saline

## 2018-09-25 ENCOUNTER — HOSPITAL ENCOUNTER (OUTPATIENT)
Dept: WOUND CARE | Age: 83
Discharge: HOME | End: 2018-09-25
Attending: SURGERY
Payer: MEDICARE

## 2018-09-25 VITALS
SYSTOLIC BLOOD PRESSURE: 121 MMHG | BODY MASS INDEX: 31.9 KG/M2 | HEART RATE: 60 BPM | DIASTOLIC BLOOD PRESSURE: 58 MMHG | WEIGHT: 222.8 LBS | TEMPERATURE: 97.9 F | RESPIRATION RATE: 18 BRPM | OXYGEN SATURATION: 99 % | HEIGHT: 70 IN

## 2018-09-25 PROCEDURE — 29581 APPL MULTLAYER CMPRN SYS LEG: CPT

## 2018-09-25 NOTE — WOUND CARE
09/25/18 1055   Wound Right; Anterior   Date First Assessed: 09/21/18   POA: Yes  Wound Type: Venous  Wound Description (Optional): #1  Orientation: Right; Anterior   DRESSING STATUS Clean, dry, and intact   DRESSING TYPE (hydrofera ready, ABD, Coban 2)   Wound Length (cm) 0.6 cm   Wound Width (cm) 0.5 cm   Wound Depth (cm) 0.1   Wound Surface area (cm^2) 0.3 cm^2   Change in Wound Size % 58.33   Tissue Type Percent Red 100   Drainage Amount  Scant   Drainage Color Serous   Wound Odor None   Periwound Skin Condition Intact   Cleansing and Cleansing Agents  Normal saline   Dressing Type Applied (Hydrofera Ready, ABD, Coban 2)   Procedure Tolerated Well

## 2018-09-28 ENCOUNTER — HOSPITAL ENCOUNTER (OUTPATIENT)
Dept: WOUND CARE | Age: 83
Discharge: HOME | End: 2018-09-28
Attending: SURGERY
Payer: MEDICARE

## 2018-09-28 VITALS
BODY MASS INDEX: 33.33 KG/M2 | HEIGHT: 70 IN | OXYGEN SATURATION: 99 % | HEART RATE: 62 BPM | DIASTOLIC BLOOD PRESSURE: 66 MMHG | SYSTOLIC BLOOD PRESSURE: 123 MMHG | TEMPERATURE: 98.7 F | WEIGHT: 232.8 LBS | RESPIRATION RATE: 18 BRPM

## 2018-09-28 PROCEDURE — 29581 APPL MULTLAYER CMPRN SYS LEG: CPT

## 2018-09-28 NOTE — WOUND CARE
09/28/18 1540   Wound Right; Anterior   Date First Assessed: 09/21/18   POA: Yes  Wound Type: Venous  Wound Description (Optional): #1  Orientation: Right; Anterior   DRESSING STATUS Clean, dry, and intact   DRESSING TYPE (Hydrofera Ready, ABD, Coban 2)   Non-Pressure Injury Full thickness (subcut/muscle)   Wound Length (cm) 1 cm   Wound Width (cm) 0.8 cm   Wound Depth (cm) 0.1   Wound Surface area (cm^2) 0.8 cm^2   Change in Wound Size % -11.11   Condition of Base Granulation   Tissue Type Percent Red 100   Drainage Amount  Scant   Drainage Color Serous   Wound Odor None   Periwound Skin Condition Intact   Cleansing and Cleansing Agents  Normal saline; Soap and water

## 2018-09-28 NOTE — WOUND CARE
[address]  [address], [address]  Phone: [phone]  Fax: [phone]    Patient: Julio Costello MRN: [id number]  SSN: [id number]    YOB: 1935  Age: 81 y.o. Sex: male       Return Appointment: 1 week with Osbaldo Valenzuela MD    Instructions:   Cleanse with Normal Saline. Apply Hydrofera Ready to Wound, Cover with ABD, Wrap with Coban 2. Change once a week. Should you experience increased redness, swelling, pain, foul odor, size of wound(s), or have a temperature over 101 degrees please contact the [address] [place] [address] Road at [phone] or if after hours contact your primary care physician or go to the hospital emergency department.     Signed By: Adore Douglas RN     September 28, 2018

## 2018-10-05 ENCOUNTER — HOSPITAL ENCOUNTER (OUTPATIENT)
Dept: WOUND CARE | Age: 83
Discharge: HOME | End: 2018-10-05
Attending: SURGERY
Payer: MEDICARE

## 2018-10-05 VITALS
WEIGHT: 231.8 LBS | TEMPERATURE: 98.1 F | SYSTOLIC BLOOD PRESSURE: 125 MMHG | RESPIRATION RATE: 18 BRPM | OXYGEN SATURATION: 99 % | BODY MASS INDEX: 33.18 KG/M2 | HEART RATE: 73 BPM | HEIGHT: 70 IN | DIASTOLIC BLOOD PRESSURE: 68 MMHG

## 2018-10-05 PROCEDURE — 29581 APPL MULTLAYER CMPRN SYS LEG: CPT

## 2018-10-05 PROCEDURE — 77030011256 HC DRSG MEPILEX <16IN NO BORD MOLN -A

## 2018-10-05 NOTE — WOUND CARE
[address] [place] [address], [address] [address] Phone: [phone] Fax: [phone] Patient: Ethel Bateman MRN: [id number]  SSN: [id number] YOB: 1935  Age: 81 y.o. Sex: male Return Appointment: 1 week with Juan Jose Patel MD  
2x/week with Clinician for Dressing Change Instructions: Cleanse with Normal Saline. Apply Bordered Foam to Reddened Area on Ankle. Apply Hydrofera Ready to Wound, Cover with ABD, Wrap with Coban 2. Change 2x/week at [facility]  
  
 
 
 
Should you experience increased redness, swelling, pain, foul odor, size of wound(s), or have a temperature over 101 degrees please contact the [address] [place] [address] Road at [phone] or if after hours contact your primary care physician or go to the hospital emergency department. Signed By: Fadi Ramírez RN October 5, 2018   
  
2

## 2018-10-05 NOTE — WOUND CARE
10/05/18 1326 Wound Right; Anterior Date First Assessed: 09/21/18   POA: Yes  Wound Type: Venous  Wound Description (Optional): #1  Orientation: Right; Anterior DRESSING STATUS Clean, dry, and intact DRESSING TYPE (Hydrofera Ready, ABD, Coban 2) Non-Pressure Injury Full thickness (subcut/muscle) Wound Length (cm) 0.9 cm Wound Width (cm) 0.9 cm Wound Depth (cm) 0.1 Wound Surface area (cm^2) 0.81 cm^2 Change in Wound Size % -12.5 Condition of Base Granulation Tissue Type Percent Red 100 Drainage Amount  Scant Drainage Color Serous Wound Odor None Periwound Skin Condition Intact Cleansing and Cleansing Agents  Normal saline

## 2018-10-09 ENCOUNTER — HOSPITAL ENCOUNTER (OUTPATIENT)
Dept: WOUND CARE | Age: 83
Discharge: HOME | End: 2018-10-09
Attending: SURGERY
Payer: MEDICARE

## 2018-10-09 VITALS
OXYGEN SATURATION: 99 % | HEIGHT: 70 IN | TEMPERATURE: 98.4 F | RESPIRATION RATE: 18 BRPM | WEIGHT: 233 LBS | DIASTOLIC BLOOD PRESSURE: 53 MMHG | HEART RATE: 61 BPM | SYSTOLIC BLOOD PRESSURE: 117 MMHG | BODY MASS INDEX: 33.36 KG/M2

## 2018-10-09 PROCEDURE — 29581 APPL MULTLAYER CMPRN SYS LEG: CPT

## 2018-10-09 NOTE — WOUND CARE
10/09/18 1328 Wound Right; Anterior Date First Assessed: 09/21/18   POA: Yes  Wound Type: Venous  Wound Description (Optional): #1  Orientation: Right; Anterior DRESSING STATUS Clean, dry, and intact DRESSING TYPE (hydrofera ready, AVD, coban 2) Wound Length (cm) 0.5 cm Wound Width (cm) 0.5 cm Wound Depth (cm) 0.1 Wound Surface area (cm^2) 0.25 cm^2 Change in Wound Size % 65.28 Condition of Base Granulation Tissue Type Percent Red 100 Drainage Amount  Scant Drainage Color Serous Wound Odor None Periwound Skin Condition Intact Cleansing and Cleansing Agents  Normal saline Dressing Type Applied (hydrofera ready, ABD, coban 2)

## 2018-10-12 ENCOUNTER — HOSPITAL ENCOUNTER (OUTPATIENT)
Dept: WOUND CARE | Age: 83
Discharge: HOME | End: 2018-10-12
Attending: SURGERY
Payer: MEDICARE

## 2018-10-12 VITALS
HEIGHT: 70 IN | TEMPERATURE: 97.9 F | DIASTOLIC BLOOD PRESSURE: 93 MMHG | OXYGEN SATURATION: 99 % | HEART RATE: 66 BPM | SYSTOLIC BLOOD PRESSURE: 150 MMHG | RESPIRATION RATE: 18 BRPM

## 2018-10-12 PROCEDURE — 29581 APPL MULTLAYER CMPRN SYS LEG: CPT

## 2018-10-12 NOTE — WOUND CARE
[address] [place] [address], [address] [address] Phone: [phone] Fax: [phone] Patient: Ирина Aguilar MRN: [id number]  SSN: [id number] YOB: 1935  Age: 81 y.o. Sex: male Return Appointment: 1 week with Ly Todd MD 
 
Instructions:  1 week with Ly Todd MD  
2x/week with Clinician for Dressing Change Instructions: Cleanse with Normal Saline. Apply Bordered Foam to Reddened Area on Ankle. Apply Hydrofera Ready to Wound, Cover with ABD, Wrap with Coban 2. Change 2x/week at [facility] Should you experience increased redness, swelling, pain, foul odor, size of wound(s), or have a temperature over 101 degrees please contact the [address] [place] [address] Road at [phone] or if after hours contact your primary care physician or go to the hospital emergency department. Signed By: Vivian Gant RN October 12, 2018

## 2018-10-12 NOTE — WOUND CARE
10/12/18 1538 Wound Right; Anterior Date First Assessed: 09/21/18   POA: Yes  Wound Type: Venous  Wound Description (Optional): #1  Orientation: Right; Anterior DRESSING STATUS Clean, dry, and intact DRESSING TYPE (Hydrofera Ready, ABD, Coban 2) Wound Length (cm) 0.5 cm Wound Width (cm) 0.5 cm Wound Depth (cm) 0.1 Wound Surface area (cm^2) 0.25 cm^2 Change in Wound Size % 65.28 Condition of Base Granulation Tissue Type Percent Red 100 Drainage Amount  Scant Drainage Color Serous Wound Odor None Periwound Skin Condition Intact Cleansing and Cleansing Agents  Normal saline

## 2018-10-16 ENCOUNTER — HOSPITAL ENCOUNTER (OUTPATIENT)
Dept: WOUND CARE | Age: 83
Discharge: HOME | End: 2018-10-16
Attending: SURGERY
Payer: MEDICARE

## 2018-10-16 VITALS
SYSTOLIC BLOOD PRESSURE: 132 MMHG | RESPIRATION RATE: 18 BRPM | HEIGHT: 70 IN | WEIGHT: 233.6 LBS | OXYGEN SATURATION: 96 % | TEMPERATURE: 98.3 F | HEART RATE: 67 BPM | BODY MASS INDEX: 33.44 KG/M2 | DIASTOLIC BLOOD PRESSURE: 77 MMHG

## 2018-10-16 PROCEDURE — 29581 APPL MULTLAYER CMPRN SYS LEG: CPT

## 2018-10-16 NOTE — WOUND CARE
10/16/18 1558 Wound Right; Anterior Date First Assessed: 09/21/18   POA: Yes  Wound Type: Venous  Wound Description (Optional): #1  Orientation: Right; Anterior DRESSING STATUS Clean, dry, and intact DRESSING TYPE (Hydrofera Ready, ABD, Coban 2) Non-Pressure Injury Full thickness (subcut/muscle) Wound Length (cm) 0.1 cm Wound Width (cm) 0.2 cm Wound Depth (cm) 0.1 Wound Surface area (cm^2) 0.02 cm^2 Change in Wound Size % 97.22 Condition of Base Epithelializing;Granulation Epithelialization (%) 80 Tissue Type Percent Red 100 Drainage Amount  Scant Drainage Color Serous Wound Odor None Periwound Skin Condition Intact Cleansing and Cleansing Agents  Soap and water Dressing Type Applied (Hydrofera Ready, ABD, Coban 2) Procedure Tolerated Well

## 2018-10-19 ENCOUNTER — HOSPITAL ENCOUNTER (OUTPATIENT)
Dept: WOUND CARE | Age: 83
Discharge: HOME | End: 2018-10-19
Attending: SURGERY
Payer: MEDICARE

## 2018-10-19 VITALS
DIASTOLIC BLOOD PRESSURE: 52 MMHG | HEART RATE: 64 BPM | HEIGHT: 70 IN | WEIGHT: 231 LBS | SYSTOLIC BLOOD PRESSURE: 123 MMHG | BODY MASS INDEX: 33.07 KG/M2 | RESPIRATION RATE: 18 BRPM | TEMPERATURE: 97.6 F | OXYGEN SATURATION: 97 %

## 2018-10-19 PROCEDURE — 99213 OFFICE O/P EST LOW 20 MIN: CPT

## 2018-10-19 NOTE — WOUND CARE
[address] [place] [address], [facility] [address] Phone: [phone] Fax: [phone] Patient: Traci Lee MRN: [id number]  SSN: [id number] YOB: 1935  Age: 81 y.o. Sex: male Return Appointment: Discharge from service with Kadie Hook MD 
 
Instructions: No dressing needed. Discharge from service. Purchase compression stocking. 15-20mmHg compression. Foot-23 cm Ankle-24 cm Calf-35 cm Should you experience increased redness, swelling, pain, foul odor, size of wound(s), or have a temperature over 101 degrees please contact the [address] [place] [address] Road at [phone] or if after hours contact your primary care physician or go to the hospital emergency department. Signed By: Tamra Rose RN October 19, 2018

## 2018-10-19 NOTE — WOUND CARE
10/19/18 1537 Wound Right; Anterior Date First Assessed: 09/21/18   POA: Yes  Wound Type: Venous  Wound Description (Optional): #1  Orientation: Right; Anterior DRESSING STATUS Clean, dry, and intact DRESSING TYPE (hydrofera ready, abd, coban 2) Wound Length (cm) 0 cm Wound Width (cm) 0 cm Wound Depth (cm) 0 Wound Surface area (cm^2) 0 cm^2 Change in Wound Size % 100 Condition of Base Epithelializing Tissue Type Percent Pink 100 Drainage Amount  None Wound Odor None Periwound Skin Condition Intact Cleansing and Cleansing Agents  Soap and water

## 2018-11-02 ENCOUNTER — APPOINTMENT (RX ONLY)
Dept: URBAN - METROPOLITAN AREA CLINIC 349 | Facility: CLINIC | Age: 83
Setting detail: DERMATOLOGY
End: 2018-11-02

## 2018-11-02 DIAGNOSIS — L57.0 ACTINIC KERATOSIS: ICD-10-CM

## 2018-11-02 DIAGNOSIS — Z85.828 PERSONAL HISTORY OF OTHER MALIGNANT NEOPLASM OF SKIN: ICD-10-CM

## 2018-11-02 PROCEDURE — ? TREATMENT REGIMEN

## 2018-11-02 PROCEDURE — ? LIQUID NITROGEN

## 2018-11-02 PROCEDURE — ? COUNSELING

## 2018-11-02 PROCEDURE — 17000 DESTRUCT PREMALG LESION: CPT

## 2018-11-02 PROCEDURE — 99213 OFFICE O/P EST LOW 20 MIN: CPT | Mod: 25

## 2018-11-02 PROCEDURE — 17003 DESTRUCT PREMALG LES 2-14: CPT

## 2018-11-02 ASSESSMENT — PAIN INTENSITY VAS: HOW INTENSE IS YOUR PAIN 0 BEING NO PAIN, 10 BEING THE MOST SEVERE PAIN POSSIBLE?: 1/10 PAIN

## 2018-11-02 ASSESSMENT — LOCATION DETAILED DESCRIPTION DERM
LOCATION DETAILED: LEFT MEDIAL FRONTAL SCALP
LOCATION DETAILED: RIGHT FOREHEAD
LOCATION DETAILED: LEFT CENTRAL MALAR CHEEK
LOCATION DETAILED: LEFT SUPERIOR UPPER BACK
LOCATION DETAILED: LEFT MEDIAL SUPERIOR CHEST

## 2018-11-02 ASSESSMENT — LOCATION ZONE DERM
LOCATION ZONE: SCALP
LOCATION ZONE: TRUNK
LOCATION ZONE: FACE

## 2018-11-02 ASSESSMENT — LOCATION SIMPLE DESCRIPTION DERM
LOCATION SIMPLE: LEFT CHEEK
LOCATION SIMPLE: LEFT SCALP
LOCATION SIMPLE: CHEST
LOCATION SIMPLE: LEFT UPPER BACK
LOCATION SIMPLE: RIGHT FOREHEAD

## 2018-11-02 NOTE — PROCEDURE: LIQUID NITROGEN
Post-Care Instructions: I reviewed with the patient in detail post-care instructions. Patient is to wear sunprotection, and avoid picking at any of the treated lesions. Pt may apply Vaseline to crusted or scabbing areas.
Detail Level: Detailed
Consent: The patient's consent was obtained including but not limited to risks of crusting, scabbing, blistering, scarring, darker or lighter pigmentary change, recurrence, incomplete removal and infection.
Render Post-Care Instructions In Note?: no
Number Of Freeze-Thaw Cycles: 2 freeze-thaw cycles
Duration Of Freeze Thaw-Cycle (Seconds): 3

## 2018-11-20 ENCOUNTER — APPOINTMENT (RX ONLY)
Dept: URBAN - METROPOLITAN AREA CLINIC 349 | Facility: CLINIC | Age: 83
Setting detail: DERMATOLOGY
End: 2018-11-20

## 2018-11-20 DIAGNOSIS — L82.0 INFLAMED SEBORRHEIC KERATOSIS: ICD-10-CM

## 2018-11-20 PROCEDURE — ? PATHOLOGY BILLING

## 2018-11-20 PROCEDURE — A4550 SURGICAL TRAYS: HCPCS

## 2018-11-20 PROCEDURE — 88305 TISSUE EXAM BY PATHOLOGIST: CPT

## 2018-11-20 PROCEDURE — ? SHAVE REMOVAL

## 2018-11-20 PROCEDURE — 11307 SHAVE SKIN LESION 1.1-2.0 CM: CPT

## 2018-11-20 PROCEDURE — ? COUNSELING

## 2018-11-20 ASSESSMENT — LOCATION ZONE DERM
LOCATION ZONE: NECK
LOCATION ZONE: NECK

## 2018-11-20 ASSESSMENT — LOCATION DETAILED DESCRIPTION DERM
LOCATION DETAILED: RIGHT CLAVICULAR NECK
LOCATION DETAILED: RIGHT CLAVICULAR NECK

## 2018-11-20 ASSESSMENT — LOCATION SIMPLE DESCRIPTION DERM
LOCATION SIMPLE: RIGHT ANTERIOR NECK
LOCATION SIMPLE: RIGHT ANTERIOR NECK

## 2018-11-20 NOTE — PROCEDURE: SHAVE REMOVAL
Bill For Surgical Tray: yes
X Size Of Lesion In Cm (Optional): 0
Anesthesia Volume In Cc: 0.5
Accession #: [id number] [email or web address] [id number]
Medical Necessity Clause: This procedure was medically necessary because the lesion that was treated was: changing
Billing Type: Third-Party Bill
Medical Necessity Information: It is in your best interest to select a reason for this procedure from the list below. All of these items fulfill various CMS LCD requirements except the new and changing color options.
Wound Care: Vaseline
Consent was obtained from the patient. The risks and benefits to therapy were discussed in detail. Specifically, the risks of infection, scarring, bleeding, prolonged wound healing, incomplete removal, allergy to anesthesia, nerve injury and recurrence were addressed. Prior to the procedure, the treatment site was clearly identified and confirmed by the patient. All components of Universal Protocol/PAUSE Rule completed.
Anesthesia Type: 2% lidocaine with epinephrine
Post-Care Instructions: I reviewed with the patient in detail post-care instructions. Patient is to keep the biopsy site dry overnight, and then apply vaseline twice daily until healed. Patient may apply hydrogen peroxide soaks to remove any crusting. After the procedure, the patient was observed for 5-10 minutes and was oriented to person, place and time and demied feeling dizzy, queasy, and stated that they did not feel that they were going to faint.
Hemostasis: Electrocautery
Bill 07307 For Specimen Handling/Conveyance To Laboratory?: no
Biopsy Method: Dermablade
Size Of Lesion In Cm (Required): 1.2
Notification Instructions: Patient will be notified of biopsy results. However, patient instructed to call the office if not contacted within 2 weeks.
Detail Level: Detailed

## 2018-11-20 NOTE — PROCEDURE: PATHOLOGY BILLING
Immunohistochemistry ([id number] and [id number]) billing is not performed here. Please use the Immunohistochemistry Stain Billing plan to accomplish this. Immunohistochemistry ([id number] and [id number]) billing is not performed here. Please use the Immunohistochemistry Stain Billing plan to accomplish this.

## 2018-12-04 PROBLEM — E11.622 DIABETES MELLITUS WITH SKIN ULCER (HCC): Status: RESOLVED | Noted: 2018-09-21 | Resolved: 2018-12-04

## 2018-12-04 PROBLEM — E11.9 TYPE 2 DIABETES MELLITUS (HCC): Status: ACTIVE | Noted: 2017-08-29

## 2018-12-04 PROBLEM — L97.212 NON-PRESSURE CHRONIC ULCER OF RIGHT CALF WITH FAT LAYER EXPOSED (HCC): Status: RESOLVED | Noted: 2018-09-21 | Resolved: 2018-12-04

## 2018-12-04 PROBLEM — L98.499 DIABETES MELLITUS WITH SKIN ULCER (HCC): Status: RESOLVED | Noted: 2018-09-21 | Resolved: 2018-12-04

## 2019-01-01 ENCOUNTER — APPOINTMENT (RX ONLY)
Dept: URBAN - METROPOLITAN AREA CLINIC 349 | Facility: CLINIC | Age: 84
Setting detail: DERMATOLOGY
End: 2019-01-01

## 2019-01-01 ENCOUNTER — HOSPITAL ENCOUNTER (OUTPATIENT)
Dept: GENERAL RADIOLOGY | Age: 84
Discharge: HOME | End: 2019-06-18

## 2019-01-01 DIAGNOSIS — L82.1 OTHER SEBORRHEIC KERATOSIS: ICD-10-CM

## 2019-01-01 DIAGNOSIS — Z71.89 OTHER SPECIFIED COUNSELING: ICD-10-CM

## 2019-01-01 DIAGNOSIS — M25.532 WRIST PAIN, ACUTE, LEFT: ICD-10-CM

## 2019-01-01 DIAGNOSIS — Z85.828 PERSONAL HISTORY OF OTHER MALIGNANT NEOPLASM OF SKIN: ICD-10-CM

## 2019-01-01 DIAGNOSIS — L57.0 ACTINIC KERATOSIS: ICD-10-CM

## 2019-01-01 DIAGNOSIS — L71.8 OTHER ROSACEA: ICD-10-CM

## 2019-01-01 PROCEDURE — ? COUNSELING

## 2019-01-01 PROCEDURE — 17000 DESTRUCT PREMALG LESION: CPT

## 2019-01-01 PROCEDURE — 99213 OFFICE O/P EST LOW 20 MIN: CPT | Mod: 25

## 2019-01-01 PROCEDURE — ? LIQUID NITROGEN

## 2019-01-01 PROCEDURE — 17004 DESTROY PREMAL LESIONS 15/>: CPT

## 2019-01-01 PROCEDURE — 17003 DESTRUCT PREMALG LES 2-14: CPT

## 2019-01-01 PROCEDURE — ? TREATMENT REGIMEN

## 2019-01-01 PROCEDURE — ? PRESCRIPTION

## 2019-01-01 RX ORDER — METRONIDAZOLE 7.5 MG/G
CREAM TOPICAL
Qty: 1 | Refills: 6 | Status: ERX | COMMUNITY
Start: 2019-01-01

## 2019-01-01 RX ADMIN — METRONIDAZOLE: 7.5 CREAM TOPICAL at 15:45

## 2019-01-01 ASSESSMENT — LOCATION DETAILED DESCRIPTION DERM
LOCATION DETAILED: LEFT CENTRAL FRONTAL SCALP
LOCATION DETAILED: LEFT MID PREAURICULAR CHEEK
LOCATION DETAILED: LEFT INFERIOR FOREHEAD
LOCATION DETAILED: RIGHT SUPERIOR OCCIPITAL SCALP
LOCATION DETAILED: MID-OCCIPITAL SCALP
LOCATION DETAILED: LEFT SUPERIOR PREAURICULAR CHEEK
LOCATION DETAILED: MID-OCCIPITAL SCALP
LOCATION DETAILED: LEFT SUPERIOR FOREHEAD
LOCATION DETAILED: LEFT INFERIOR TEMPLE
LOCATION DETAILED: LEFT LATERAL MANDIBULAR CHEEK
LOCATION DETAILED: RIGHT MEDIAL FRONTAL SCALP
LOCATION DETAILED: LEFT SUPERIOR LATERAL FOREHEAD
LOCATION DETAILED: RIGHT SUPERIOR CENTRAL MALAR CHEEK
LOCATION DETAILED: RIGHT LATERAL ZYGOMA
LOCATION DETAILED: MEDIAL FRONTAL SCALP
LOCATION DETAILED: LEFT MEDIAL FRONTAL SCALP
LOCATION DETAILED: RIGHT POSTERIOR PARIETAL SCALP
LOCATION DETAILED: LEFT SUPERIOR FOREHEAD
LOCATION DETAILED: LEFT CENTRAL PARIETAL SCALP
LOCATION DETAILED: MID-OCCIPITAL SCALP
LOCATION DETAILED: LEFT SUPERIOR OCCIPITAL SCALP
LOCATION DETAILED: LEFT MEDIAL FOREHEAD
LOCATION DETAILED: LEFT SUPERIOR LATERAL FOREHEAD
LOCATION DETAILED: GLABELLA
LOCATION DETAILED: LEFT MEDIAL FOREHEAD
LOCATION DETAILED: RIGHT SUPERIOR OCCIPITAL SCALP
LOCATION DETAILED: LEFT SUPERIOR OCCIPITAL SCALP
LOCATION DETAILED: LEFT CENTRAL PARIETAL SCALP
LOCATION DETAILED: LEFT LATERAL FOREHEAD
LOCATION DETAILED: LEFT INFERIOR PREAURICULAR CHEEK
LOCATION DETAILED: STERNUM
LOCATION DETAILED: LEFT CENTRAL TEMPLE
LOCATION DETAILED: LEFT CENTRAL MALAR CHEEK
LOCATION DETAILED: RIGHT CENTRAL FRONTAL SCALP
LOCATION DETAILED: LEFT SUPERIOR PARIETAL SCALP
LOCATION DETAILED: RIGHT FOREHEAD
LOCATION DETAILED: LEFT SUPERIOR PARIETAL SCALP
LOCATION DETAILED: GLABELLA
LOCATION DETAILED: LEFT SUPERIOR OCCIPITAL SCALP
LOCATION DETAILED: RIGHT CENTRAL ZYGOMA
LOCATION DETAILED: POSTERIOR MID-PARIETAL SCALP
LOCATION DETAILED: LEFT SUPERIOR PARIETAL SCALP
LOCATION DETAILED: RIGHT SUPERIOR PARIETAL SCALP
LOCATION DETAILED: STERNUM
LOCATION DETAILED: RIGHT LATERAL MALAR CHEEK
LOCATION DETAILED: RIGHT SUPERIOR POSTERIOR PARIETAL SCALP
LOCATION DETAILED: LEFT LATERAL EYEBROW
LOCATION DETAILED: LEFT LATERAL FOREHEAD
LOCATION DETAILED: LEFT POSTERIOR PARIETAL SCALP
LOCATION DETAILED: MID-OCCIPITAL SCALP
LOCATION DETAILED: POSTERIOR MID-PARIETAL SCALP
LOCATION DETAILED: POSTERIOR MID-PARIETAL SCALP
LOCATION DETAILED: GLABELLA
LOCATION DETAILED: RIGHT CENTRAL TEMPLE
LOCATION DETAILED: LEFT SUPERIOR OCCIPITAL SCALP
LOCATION DETAILED: RIGHT CENTRAL TEMPLE
LOCATION DETAILED: RIGHT SUPERIOR MEDIAL UPPER BACK
LOCATION DETAILED: RIGHT SUPERIOR PARIETAL SCALP
LOCATION DETAILED: RIGHT INFERIOR LATERAL FOREHEAD
LOCATION DETAILED: LEFT LATERAL EYEBROW
LOCATION DETAILED: SUPERIOR MID FOREHEAD
LOCATION DETAILED: RIGHT SUPERIOR FOREHEAD
LOCATION DETAILED: RIGHT CENTRAL EYEBROW
LOCATION DETAILED: RIGHT CENTRAL MALAR CHEEK
LOCATION DETAILED: LEFT CENTRAL FRONTAL SCALP

## 2019-01-01 ASSESSMENT — LOCATION SIMPLE DESCRIPTION DERM
LOCATION SIMPLE: RIGHT CHEEK
LOCATION SIMPLE: POSTERIOR SCALP
LOCATION SIMPLE: LEFT TEMPLE
LOCATION SIMPLE: LEFT EYEBROW
LOCATION SIMPLE: LEFT CHEEK
LOCATION SIMPLE: GLABELLA
LOCATION SIMPLE: POSTERIOR SCALP
LOCATION SIMPLE: LEFT SCALP
LOCATION SIMPLE: RIGHT OCCIPITAL SCALP
LOCATION SIMPLE: LEFT OCCIPITAL SCALP
LOCATION SIMPLE: SCALP
LOCATION SIMPLE: POSTERIOR SCALP
LOCATION SIMPLE: LEFT CHEEK
LOCATION SIMPLE: LEFT TEMPLE
LOCATION SIMPLE: SCALP
LOCATION SIMPLE: POSTERIOR SCALP
LOCATION SIMPLE: RIGHT FOREHEAD
LOCATION SIMPLE: RIGHT SCALP
LOCATION SIMPLE: LEFT EYEBROW
LOCATION SIMPLE: POSTERIOR SCALP
LOCATION SIMPLE: RIGHT FOREHEAD
LOCATION SIMPLE: LEFT FOREHEAD
LOCATION SIMPLE: POSTERIOR SCALP
LOCATION SIMPLE: GLABELLA
LOCATION SIMPLE: CHEST
LOCATION SIMPLE: GLABELLA
LOCATION SIMPLE: SCALP
LOCATION SIMPLE: RIGHT SCALP
LOCATION SIMPLE: LEFT FOREHEAD
LOCATION SIMPLE: RIGHT CHEEK
LOCATION SIMPLE: LEFT OCCIPITAL SCALP
LOCATION SIMPLE: RIGHT UPPER BACK
LOCATION SIMPLE: LEFT OCCIPITAL SCALP
LOCATION SIMPLE: RIGHT TEMPLE
LOCATION SIMPLE: FRONTAL SCALP
LOCATION SIMPLE: RIGHT TEMPLE
LOCATION SIMPLE: LEFT FOREHEAD
LOCATION SIMPLE: LEFT CHEEK
LOCATION SIMPLE: SCALP
LOCATION SIMPLE: SUPERIOR FOREHEAD
LOCATION SIMPLE: RIGHT OCCIPITAL SCALP
LOCATION SIMPLE: RIGHT ZYGOMA
LOCATION SIMPLE: SCALP
LOCATION SIMPLE: CHEST
LOCATION SIMPLE: RIGHT EYEBROW

## 2019-01-01 ASSESSMENT — LOCATION ZONE DERM
LOCATION ZONE: FACE
LOCATION ZONE: SCALP
LOCATION ZONE: FACE
LOCATION ZONE: SCALP
LOCATION ZONE: TRUNK
LOCATION ZONE: SCALP
LOCATION ZONE: FACE
LOCATION ZONE: TRUNK
LOCATION ZONE: FACE
LOCATION ZONE: FACE
LOCATION ZONE: SCALP
LOCATION ZONE: FACE
LOCATION ZONE: SCALP
LOCATION ZONE: TRUNK
LOCATION ZONE: SCALP

## 2019-01-01 ASSESSMENT — PAIN INTENSITY VAS: HOW INTENSE IS YOUR PAIN 0 BEING NO PAIN, 10 BEING THE MOST SEVERE PAIN POSSIBLE?: 1/10 PAIN

## 2019-01-03 ENCOUNTER — APPOINTMENT (RX ONLY)
Dept: URBAN - METROPOLITAN AREA CLINIC 349 | Facility: CLINIC | Age: 84
Setting detail: DERMATOLOGY
End: 2019-01-03

## 2019-01-03 PROBLEM — C44.42 SQUAMOUS CELL CARCINOMA OF SKIN OF SCALP AND NECK: Status: ACTIVE | Noted: 2019-01-03

## 2019-01-03 PROCEDURE — 88305 TISSUE EXAM BY PATHOLOGIST: CPT

## 2019-01-03 PROCEDURE — A4550 SURGICAL TRAYS: HCPCS

## 2019-01-03 PROCEDURE — 17272 DSTR MAL LES S/N/H/F/G 1.1-2: CPT

## 2019-01-03 PROCEDURE — ? PATHOLOGY BILLING

## 2019-01-03 PROCEDURE — ? SHAVE REMOVAL AND DESTRUCTION

## 2019-01-03 NOTE — PROCEDURE: PATHOLOGY BILLING
Immunohistochemistry ([id number] and [id number]) billing is not performed here. Please use the Immunohistochemistry Stain Billing plan to accomplish this. Immunohistochemistry ([id number] and [id number]) billing is not performed here. Please use the Immunohistochemistry Stain Billing plan to accomplish this.

## 2019-01-03 NOTE — PROCEDURE: SHAVE REMOVAL AND DESTRUCTION
Anesthesia Type: 2% lidocaine with epinephrine
Was Curettage Performed?: Yes
Bill As?: Note: Bill Malignant Destruction If Path Confirms Malignant Lesion. Only Bill As Shave Removal If Path Comes Back Benign. Do Not Bill Shave Removal On Malignant Lesions.: Malignant Destruction
Consent: Written consent was obtained and risks were reviewed including but not limited to scarring, infection, bleeding, scabbing, incomplete removal, nerve damage and allergy to anesthesia.
Dressing: Band-Aid
Size Of Lesion In Cm: 1.2
Billing Type: Third-Party Bill
Number Of Curettages: 1
Cautery Type: electrodesiccation
Hemostasis: Electrocautery
Detail Level: Detailed
Bill 09147 For Specimen Handling/Conveyance To Laboratory?: no
Anesthesia Volume In Cc: 0.2
Post-Care Instructions: I reviewed with the patient in detail post-care instructions. Patient is to keep the biopsy site dry overnight, and then apply bacitracin twice daily until healed. Patient may apply hydrogen peroxide soaks to remove any crusting.  After the procedure, the patient was observed for 5-10 minutes and was oriented to,person, place and time and denied feeling dizzy, queasy and stated that they were not going to faint
Accession #: [id number] [email or web address]
Anesthesia Volume In Cc: 0
Notification Instructions: Patient will be notified of biopsy results. However, patient instructed to call the office if not contacted within 2 weeks.
Wound Care: Vaseline
Size After Destruction (Required For Destruction Billing): 1.5

## 2019-02-17 NOTE — PROGRESS NOTES
Wound Center Progress Note    Patient: Aguila Narayanan MRN: [id number]  SSN: [id number]    YOB: 1935  Age: 81 y.o. 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 No pain or claudication issues. Happy with wound progress.        Past Medical History:   Diagnosis Date    Arthritis     Atrial fibrillation (HCC)     chronic    CAD (coronary artery disease)     Cancer (HCC)     skin    DM (diabetes mellitus), type 2 ([address]     Family history of prostate problems     GERD (gastroesophageal reflux disease)     Hearing difficulty     HLD (hyperlipidemia)     Hypertension     Hypothyroidism     Insulin dependent diabetes mellitus ([address]     Kidney problem     Systolic heart failure ([address]       Past Surgical History:   Procedure Laterality Date    CARDIAC SURG PROCEDURE UNLIST  06/2007    Triple By pass    CARDIAC SURG PROCEDURE UNLIST  11/14/2011    Pacemaker    CARDIAC SURG PROCEDURE UNLIST  05/10/2013    Bi-ventricular defibrillator    HX AORTIC VALVE REPLACEMENT  11/10/2011    HX APPENDECTOMY  08/2004    HX CATARACT REMOVAL  04/2011    HX CORONARY ARTERY BYPASS GRAFT      HX HERNIA REPAIR  08/2004    HX KNEE REPLACEMENT  04/25/2016    right    HX ORTHOPAEDIC  1973    [place]    HX OTHER SURGICAL      prostate surgery     HX OTHER SURGICAL      bi-ventricular pacemaker     HX PROSTATECTOMY  03/01/2006    HX TONSILLECTOMY       Family History   Problem Relation Age of Onset    Other Mother         mitral valve disease     Heart Failure Father     No Known Problems Sister       Social History     Tobacco Use    Smoking status: Never Smoker    Smokeless tobacco: Never Used   Substance Use Topics    Alcohol use: No     Comment: occ wine and beer        Prior to Admission medications    Medication Sig Start Date End Date Taking? Authorizing Provider   furosemide (LASIX) 40 mg tablet TAKE 1 TABLET BY MOUTH TWO  TIMES DAILY 1/21/19   Sierra Benton MD   carvedilol (COREG) 6.25 mg tablet TAKE 1 TABLET BY MOUTH TWO  TIMES DAILY WITH MEALS 12/19/18   Sierra Benton MD   pravastatin (PRAVACHOL) 20 mg tablet Take 1 Tab by mouth nightly. Due For Labs 12/19/18   Hoang Guajardo MD   COUMADIN 5 mg tablet 1 to 1&1/2 tabs every day or as directed 11/9/18   Hoang Guajardo MD   omeprazole (PRILOSEC) 20 mg capsule TAKE 1 CAPSULE BY MOUTH  DAILY 10/30/18   Sierra Benton MD   tamsulosin [facility]) 0.4 mg capsule TAKE 1 CAPSULE BY MOUTH  DAILY 10/21/18   Sierra Benton MD   potassium chloride (K-DUR, KLOR-CON) 20 mEq tablet TAKE 1 TABLET BY MOUTH  DAILY 10/7/18   Sierra Benton MD   Insulin Needles, Disposable, (BD ULTRA-FINE MINI PEN NEEDLE) 31 gauge x 3/16\" ndle Testing TID as directed, E11.9 8/21/18   Sierra Benton MD   lancets 33 gauge misc Test BS TID/PRN Dx E11.9 8/21/18   Sierra Benton MD   pen needle, diabetic 33 gauge x 3/16\" ndle Test bs TID/PRN Dx E11.9 pt uses easy fine one touch delica 7/63/66   Arsh Lopez Gregor, NP   ONETOUCH ULTRA BLUE TEST STRIP strip USE TO TEST THREE TIMES DAILY AND AS NEEDED 6/19/18   Sierra Benton MD   lisinopril (PRINIVIL, ZESTRIL) 2.5 mg tablet TAKE 1 TABLET BY MOUTH  DAILY 4/1/18   Sierra Benton MD   levothyroxine (SYNTHROID) 125 mcg tablet TAKE 1 TABLET BY MOUTH  DAILY BEFORE BREAKFAST 4/1/18   Sierra Benton MD   insulin aspart (NOVOLOG) 100 unit/mL injection by SubCUTAneous route as needed. 6 am, lunch 4 ; 6 supper-plus few units by scale if high    Provider, Historical   metOLazone (ZAROXOLYN) 2.5 mg tablet Take 1 Tab by mouth as needed.  6/5/17   Ese Pavon, PA aspirin delayed-release 81 mg tablet Take 81 mg by mouth daily. Provider, Historical   multivitamin (ONE A DAY) tablet Take 1 Tab by mouth daily. Provider, Historical   insulin glargine (LANTUS) 100 unit/mL injection 16 Units by SubCUTAneous route. As directed     Provider, Historical   ferrous sulfate (IRON) 325 mg (65 mg iron) tablet Take 65 mg by mouth Daily (before breakfast). Provider, Historical     Allergies   Allergen Reactions    Latex Rash    Adhesive Rash        Review of Systems:  CONSTITUTIONAL: No fever, chills  HEAD: No headache  EYES: + visual loss  ENT: + hearing loss  SKIN: No rash  CARDIOVASCULAR: No chest pain. + afib  RESPIRATORY: No shortness of breath  GASTROINTESTINAL: No nausea, vomiting  GENITOURINARY: No excessive urination  NEUROLOGICAL: No weakness  MUSCULOSKELETAL: No muscle pain. No neck pain  HEMATOLOGIC: No easy bleeding  LYMPHATICS: No lymphedema. PSYCHIATRIC: No current depression  ENDOCRINOLOGIC: No high sugars  ALLERGIES: No history of asthma, hives, eczema or rhinitis. Lab Results   Component Value Date/Time    Hemoglobin A1c 6.7 (H) 03/23/2018 10:26 AM        Immunization History   Administered Date(s) Administered    Influenza High Dose Vaccine PF 09/15/2016, 09/08/2017, 10/09/2018    Pneumococcal Conjugate (PCV-13) 04/02/2015    Pneumococcal Polysaccharide (PPSV-23) 04/22/2016    Tdap 05/22/2013    Zoster Vaccine, Live 01/22/2011       Body mass index is 33.4 kg/m². Current medications:  Current Outpatient Medications   Medication Sig Dispense Refill    furosemide (LASIX) 40 mg tablet TAKE 1 TABLET BY MOUTH TWO  TIMES DAILY 180 Tab 3    carvedilol (COREG) 6.25 mg tablet TAKE 1 TABLET BY MOUTH TWO  TIMES DAILY WITH MEALS 180 Tab 3    pravastatin (PRAVACHOL) 20 mg tablet Take 1 Tab by mouth nightly.  Due For Labs 90 Tab 0    COUMADIN 5 mg tablet 1 to 1&1/2 tabs every day or as directed 120 Tab 3    omeprazole (PRILOSEC) 20 mg capsule TAKE 1 CAPSULE BY MOUTH  DAILY 90 Cap 3    tamsulosin (FLOMAX) 0.4 mg capsule TAKE 1 CAPSULE BY MOUTH  DAILY 90 Cap 3    potassium chloride (K-DUR, KLOR-CON) 20 mEq tablet TAKE 1 TABLET BY MOUTH  DAILY 90 Tab 4    Insulin Needles, Disposable, (BD ULTRA-FINE MINI PEN NEEDLE) 31 gauge x 3/16\" ndle Testing TID as directed, E11.9 100 Pen Needle 12    lancets 33 gauge misc Test BS TID/PRN Dx E11.9 2 Package 12    pen needle, diabetic 33 gauge x 3/16\" ndle Test bs TID/PRN Dx E11.9 pt uses easy fine one touch delica 998 Pen Needle 12    ONETOUCH ULTRA BLUE TEST STRIP strip USE TO TEST THREE TIMES DAILY AND AS NEEDED 300 Strip 4    lisinopril (PRINIVIL, ZESTRIL) 2.5 mg tablet TAKE 1 TABLET BY MOUTH  DAILY 90 Tab 4    levothyroxine (SYNTHROID) 125 mcg tablet TAKE 1 TABLET BY MOUTH  DAILY BEFORE BREAKFAST 90 Tab 4    insulin aspart (NOVOLOG) 100 unit/mL injection by SubCUTAneous route as needed. 6 am, lunch 4 ; 6 supper-plus few units by scale if high      metOLazone (ZAROXOLYN) 2.5 mg tablet Take 1 Tab by mouth as needed. 90 Tab 1    aspirin delayed-release 81 mg tablet Take 81 mg by mouth daily.  multivitamin (ONE A DAY) tablet Take 1 Tab by mouth daily.  insulin glargine (LANTUS) 100 unit/mL injection 16 Units by SubCUTAneous route. As directed       ferrous sulfate (IRON) 325 mg (65 mg iron) tablet Take 65 mg by mouth Daily (before breakfast). Objective:     Physical Exam:     Visit Vitals  /66 (BP 1 Location: Left arm)   Pulse 62   Temp 98.7 °F (37.1 °C)   Resp 18   Ht 5' 10\" (1.778 m)   Wt 105.6 kg (232 lb 12.8 oz)   SpO2 99%   BMI 33.40 kg/m²       General: well developed, well nourished  Psych: cooperative. Pleasant  Neuro: alert and oriented to person/place/situation. Otherwise nonfocal.  Derm: Normal turgor for age, dry skin  HEENT: Normocephalic, atraumatic. EOMI. Neck: Normal range of motion.   Chest: Respirations nonlabored  Cardio: irreg irreg  Abdomen: Soft, nondistended  Lower extremities:   Bilateral hemosidderosis  Significant varicosities L>R  Capillary refill <3 sec    Right  2+ DP/PT    Left  2+ DP/PT                Assessment/Plan:     BLE venous insufficiency with right leg ulcer, no sign of cellulitis. Continue multilayer compression and hydrafera.  Recheck in one week and likely d/c

## 2019-03-07 PROBLEM — L57.0 ACTINIC KERATOSIS: Status: ACTIVE | Noted: 2019-01-01

## 2019-03-07 PROBLEM — Z85.828 PERSONAL HISTORY OF OTHER MALIGNANT NEOPLASM OF SKIN: Status: ACTIVE | Noted: 2019-01-01

## 2019-03-07 NOTE — PROCEDURE: LIQUID NITROGEN
Consent: The patient's consent was obtained including but not limited to risks of crusting, scabbing, blistering, scarring, darker or lighter pigmentary change, recurrence, incomplete removal and infection.
Number Of Freeze-Thaw Cycles: 1 freeze-thaw cycle
Render Post-Care Instructions In Note?: no
Post-Care Instructions: I reviewed with the patient in detail post-care instructions. Patient is to wear sunprotection, and avoid picking at any of the treated lesions. Pt may apply Vaseline to crusted or scabbing areas.
Duration Of Freeze Thaw-Cycle (Seconds): 3
Detail Level: Zone

## 2019-05-06 PROBLEM — L57.0 ACTINIC KERATOSIS: Status: ACTIVE | Noted: 2019-01-01

## 2019-05-06 PROBLEM — Z85.828 PERSONAL HISTORY OF OTHER MALIGNANT NEOPLASM OF SKIN: Status: ACTIVE | Noted: 2019-01-01

## 2019-05-06 NOTE — PROCEDURE: LIQUID NITROGEN
Consent: The patient's consent was obtained including but not limited to risks of crusting, scabbing, blistering, scarring, darker or lighter pigmentary change, recurrence, incomplete removal and infection.
Duration Of Freeze Thaw-Cycle (Seconds): 3
Render Post-Care Instructions In Note?: no
Post-Care Instructions: I reviewed with the patient in detail post-care instructions. Patient is to wear sunprotection, and avoid picking at any of the treated lesions. Pt may apply Vaseline to crusted or scabbing areas.
Detail Level: Zone
Number Of Freeze-Thaw Cycles: 3 freeze-thaw cycles

## 2019-05-29 ENCOUNTER — APPOINTMENT (OUTPATIENT)
Dept: INTERNAL MEDICINE | Facility: CLINIC | Age: 84
End: 2019-05-29
Payer: MEDICARE

## 2019-05-29 VITALS
BODY MASS INDEX: 32.56 KG/M2 | DIASTOLIC BLOOD PRESSURE: 70 MMHG | OXYGEN SATURATION: 98 % | HEART RATE: 72 BPM | TEMPERATURE: 98.5 F | SYSTOLIC BLOOD PRESSURE: 126 MMHG | WEIGHT: 230 LBS | HEIGHT: 70.5 IN

## 2019-05-29 DIAGNOSIS — I48.91 UNSPECIFIED ATRIAL FIBRILLATION: ICD-10-CM

## 2019-05-29 DIAGNOSIS — J01.90 ACUTE SINUSITIS, UNSPECIFIED: ICD-10-CM

## 2019-05-29 LAB
INR PPP: 2.7 RATIO
POCT-PROTHROMBIN TIME: 32.7 SECS
QUALITY CONTROL: YES

## 2019-05-29 PROCEDURE — 85610 PROTHROMBIN TIME: CPT | Mod: QW

## 2019-05-29 PROCEDURE — 99214 OFFICE O/P EST MOD 30 MIN: CPT | Mod: 25

## 2019-05-29 RX ORDER — AMOXICILLIN AND CLAVULANATE POTASSIUM 500; 125 MG/1; MG/1
500-125 TABLET, FILM COATED ORAL
Qty: 14 | Refills: 0 | Status: ACTIVE | OUTPATIENT
Start: 2019-05-29

## 2019-05-29 RX ORDER — LEVOTHYROXINE SODIUM 0.12 MG/1
125 TABLET ORAL
Qty: 90 | Refills: 0 | Status: ACTIVE | COMMUNITY
Start: 2018-04-01

## 2019-05-29 RX ORDER — PRAVASTATIN SODIUM 20 MG/1
20 TABLET ORAL
Qty: 90 | Refills: 0 | Status: ACTIVE | COMMUNITY
Start: 2018-12-19

## 2019-05-29 RX ORDER — FLUTICASONE PROPIONATE 50 UG/1
50 SPRAY, METERED NASAL TWICE DAILY
Qty: 1 | Refills: 0 | Status: ACTIVE | OUTPATIENT
Start: 2019-05-29

## 2019-05-29 RX ORDER — LISINOPRIL 2.5 MG/1
2.5 TABLET ORAL
Qty: 90 | Refills: 0 | Status: ACTIVE | COMMUNITY
Start: 2018-04-01

## 2019-05-29 NOTE — REVIEW OF SYSTEMS
[Negative] : Gastrointestinal [Nasal Discharge] : nasal discharge [Postnasal Drip] : postnasal drip [Cough] : cough [Sore Throat] : no sore throat [Hoarseness] : no hoarseness [Chest Pain] : no chest pain [Palpitations] : no palpitations [Dyspnea on Exertion] : not dyspnea on exertion

## 2019-05-29 NOTE — ASSESSMENT
[FreeTextEntry1] : Acute sinusitis \par - start flonase 1 spray each nostril twice daily x 1week \par -augmentin 500 po BID 1 week with food \par -OTc sugar free robutussin \par - rtc if no improvement or go to ER\par \par Afib - on coumadin -INR 2.7 - repeat INR next week \par

## 2019-05-29 NOTE — PHYSICAL EXAM
[No Acute Distress] : no acute distress [Well Nourished] : well nourished [Well Developed] : well developed [No JVD] : no jugular venous distention [Supple] : supple [No Lymphadenopathy] : no lymphadenopathy [No Respiratory Distress] : no respiratory distress  [No Accessory Muscle Use] : no accessory muscle use [Clear to Auscultation] : lungs were clear to auscultation bilaterally [Normal Rate] : normal rate  [Soft] : abdomen soft [Normal S1, S2] : normal S1 and S2 [Non Tender] : non-tender [Normal Bowel Sounds] : normal bowel sounds [de-identified] : erythema post ph wall no exudate  [de-identified] : irregular

## 2019-06-25 PROBLEM — N18.30 STAGE 3 CHRONIC KIDNEY DISEASE (HCC): Status: ACTIVE | Noted: 2019-01-01

## 2019-07-11 PROBLEM — L57.0 ACTINIC KERATOSIS: Status: ACTIVE | Noted: 2019-01-01

## 2019-07-11 PROBLEM — Z85.828 PERSONAL HISTORY OF OTHER MALIGNANT NEOPLASM OF SKIN: Status: ACTIVE | Noted: 2019-01-01

## 2019-07-11 PROBLEM — L23.7 ALLERGIC CONTACT DERMATITIS DUE TO PLANTS, EXCEPT FOOD: Status: ACTIVE | Noted: 2019-01-01

## 2019-07-11 PROBLEM — L71.8 OTHER ROSACEA: Status: ACTIVE | Noted: 2019-01-01

## 2019-07-11 PROBLEM — Z71.89 OTHER SPECIFIED COUNSELING: Status: ACTIVE | Noted: 2019-01-01

## 2019-07-11 NOTE — PROCEDURE: MIPS QUALITY
Quality 110: Preventive Care And Screening: Influenza Immunization: Influenza Immunization Administered during Influenza season
Detail Level: Generalized
Quality 111:Pneumonia Vaccination Status For Older Adults: Pneumococcal Vaccination Previously Received

## 2019-07-11 NOTE — PROCEDURE: LIQUID NITROGEN
Post-Care Instructions: I reviewed with the patient in detail post-care instructions. Patient is to wear sunprotection, and avoid picking at any of the treated lesions. Pt may apply Vaseline to crusted or scabbing areas.
Detail Level: Detailed
Consent: The patient's consent was obtained including but not limited to risks of crusting, scabbing, blistering, scarring, darker or lighter pigmentary change, recurrence, incomplete removal and infection.
Duration Of Freeze Thaw-Cycle (Seconds): 3
Number Of Freeze-Thaw Cycles: 2 freeze-thaw cycles
Render Note In Bullet Format When Appropriate: No

## 2019-07-30 NOTE — PROGRESS NOTES
Wound Center Progress Note    Patient: Philip Calles MRN: [id number]  SSN: [id number]    YOB: 1935  Age: 81 y.o. 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 No pain or claudication issues. Happy with wound progress. Continues to perform wraps 2x a week. No new issues.       Past Medical History:   Diagnosis Date    Arthritis     Atrial fibrillation (HCC)     chronic    CAD (coronary artery disease)     Cancer (HCC)     skin    DM (diabetes mellitus), type 2 ([address]     Family history of prostate problems     GERD (gastroesophageal reflux disease)     Hearing difficulty     HLD (hyperlipidemia)     Hypertension     Hypothyroidism     Insulin dependent diabetes mellitus ([address]     Kidney problem     Systolic heart failure (HCC)       Past Surgical History:   Procedure Laterality Date    CARDIAC SURG PROCEDURE UNLIST  06/2007    Triple By pass    CARDIAC SURG PROCEDURE UNLIST  11/14/2011    Pacemaker    CARDIAC SURG PROCEDURE UNLIST  05/10/2013    Bi-ventricular defibrillator    HX AORTIC VALVE REPLACEMENT  11/10/2011    HX APPENDECTOMY  08/2004    HX CATARACT REMOVAL  04/2011    HX CORONARY ARTERY BYPASS GRAFT      HX HERNIA REPAIR  08/2004    HX KNEE REPLACEMENT  04/25/2016    right    HX OR[place]AEDIC  1973    [place]    HX OTHER SURGICAL      prostate surgery     HX OTHER SURGICAL      bi-ventricular pacemaker     HX PROSTATECTOMY  03/01/2006    HX TONSILLECTOMY       Family History   Problem Relation Age of Onset    Other Mother         mitral valve disease     Heart Failure Father     No Known Problems Sister Social History     Tobacco Use    Smoking status: Never Smoker    Smokeless tobacco: Never Used   Substance Use Topics    Alcohol use: No     Comment: occ wine and beer        Prior to Admission medications    Medication Sig Start Date End Date Taking? Authorizing Provider   glucose blood VI test strips (ONETOUCH ULTRA BLUE TEST STRIP) strip USE TO TEST THREE TIMES DAILY AND AS NEEDED, E11.9 6/20/19   Sonia Rios MD   colchicine (MITIGARE) 0.6 mg capsule Take 1 Cap by mouth daily. 6/18/19   Alexi Barreto MD   Bifidobacterium Infantis (ALIGN) 4 mg cap Take  by mouth. Provider, Historical   diphenhydramine HCl (BENADRYL ALLERGY PO) Take  by mouth. Provider, Historical   guaifenesin (ROBITUSSIN CHEST CONGESTION PO) Take  by mouth. OTC medication sugar    Provider, Historical   levothyroxine (SYNTHROID) 125 mcg tablet TAKE 1 TABLET BY MOUTH  DAILY BEFORE BREAKFAST 6/1/19   Bobo Vasquez MD   lisinopril (PRINIVIL, ZESTRIL) 2.5 mg tablet TAKE 1 TABLET BY MOUTH  DAILY 6/1/19   Boob Vasquez MD   pravastatin (PRAVACHOL) 20 mg tablet Take 1 Tab by mouth nightly.  Due For Labs 4/4/19   Rogelio Abbott MD   furosemide (LASIX) 40 mg tablet TAKE 1 TABLET BY MOUTH TWO  TIMES DAILY 1/21/19   Alexi Barreto MD   carvedilol (COREG) 6.25 mg tablet TAKE 1 TABLET BY MOUTH TWO  TIMES DAILY WITH MEALS 12/19/18   Alexi Barreto MD   COUMADIN 5 mg tablet 1 to 1&1/2 tabs every day or as directed 11/9/18   Rogelio Abbott MD   omeprazole (PRILOSEC) 20 mg capsule TAKE 1 CAPSULE BY MOUTH  DAILY 10/30/18   Alexi Barreto MD   tamsulosin (FLOMAX) 0.4 mg capsule TAKE 1 CAPSULE BY MOUTH  DAILY 10/21/18   Aleix Barreto MD   potassium chloride (K-DUR, KLOR-CON) 20 mEq tablet TAKE 1 TABLET BY MOUTH  DAILY 10/7/18   Alexi Barreto MD   Insulin Needles, Disposable, (BD ULTRA-FINE MINI PEN NEEDLE) 31 gauge x 3/16\" ndle Testing TID as directed, E11.9 8/21/18   Alexi Barreto MD   lancets 33 gauge misc Test BS TID/PRN Dx E11.9 8/21/18   Alexi Barreto MD   pen needle, diabetic 33 gauge x 3/16\" ndle Test bs TID/PRN Dx E11.9 pt uses easy fine one touch delica 0/85/85   Porfirio Lopez NP   insulin aspart (NOVOLOG) 100 unit/mL injection by SubCUTAneous route as needed. 6 am, lunch 4 ; 6 supper-plus few units by scale if high    Provider, Historical   metOLazone (ZAROXOLYN) 2.5 mg tablet Take 1 Tab by mouth as needed. 6/5/17   Colby Castleman, PA   aspirin delayed-release 81 mg tablet Take 81 mg by mouth daily. Provider, Historical   multivitamin (ONE A DAY) tablet Take 1 Tab by mouth daily. Provider, Historical   insulin glargine (LANTUS) 100 unit/mL injection 16 Units by SubCUTAneous route. As directed     Provider, Historical   ferrous sulfate (IRON) 325 mg (65 mg iron) tablet Take 65 mg by mouth Daily (before breakfast). Provider, Historical     Allergies   Allergen Reactions    Latex Rash    Adhesive Rash        Review of Systems:  CONSTITUTIONAL: No fever, chills  HEAD: No headache  EYES: + visual loss  ENT: + hearing loss  SKIN: No rash  CARDIOVASCULAR: No chest pain. + afib  RESPIRATORY: No shortness of breath  GASTROINTESTINAL: No nausea, vomiting  GENITOURINARY: No excessive urination  NEUROLOGICAL: No weakness  MUSCULOSKELETAL: No muscle pain. No neck pain  HEMATOLOGIC: No easy bleeding  LYMPHATICS: No lymphedema. PSYCHIATRIC: No current depression  ENDOCRINOLOGIC: No high sugars  ALLERGIES: No history of asthma, hives, eczema or rhinitis. Lab Results   Component Value Date/Time    Hemoglobin A1c 6.8 (H) 03/27/2019 03:45 PM        Immunization History   Administered Date(s) Administered    Influenza High Dose Vaccine PF 09/15/2016, 09/08/2017, 10/09/2018    Pneumococcal Conjugate (PCV-13) 04/02/2015    Pneumococcal Polysaccharide (PPSV-23) 04/22/2016    Tdap 05/22/2013    Zoster Vaccine, Live 01/22/2011       Body mass index is 33.26 kg/m².       Current medications:  Current Outpatient Medications   Medication Sig Dispense Refill    glucose blood VI test strips (ONETOUCH ULTRA BLUE TEST STRIP) strip USE TO TEST THREE TIMES DAILY AND AS NEEDED, E11.9 300 Strip 4    colchicine (MITIGARE) 0.6 mg capsule Take 1 Cap by mouth daily. 30 Cap 0    Bifidobacterium Infantis (ALIGN) 4 mg cap Take  by mouth.  diphenhydramine HCl (BENADRYL ALLERGY PO) Take  by mouth.  guaifenesin (ROBITUSSIN CHEST CONGESTION PO) Take  by mouth. OTC medication sugar      levothyroxine (SYNTHROID) 125 mcg tablet TAKE 1 TABLET BY MOUTH  DAILY BEFORE BREAKFAST 10 Tab 1    lisinopril (PRINIVIL, ZESTRIL) 2.5 mg tablet TAKE 1 TABLET BY MOUTH  DAILY 10 Tab 1    pravastatin (PRAVACHOL) 20 mg tablet Take 1 Tab by mouth nightly. Due For Labs 90 Tab 3    furosemide (LASIX) 40 mg tablet TAKE 1 TABLET BY MOUTH TWO  TIMES DAILY 180 Tab 3    carvedilol (COREG) 6.25 mg tablet TAKE 1 TABLET BY MOUTH TWO  TIMES DAILY WITH MEALS 180 Tab 3    COUMADIN 5 mg tablet 1 to 1&1/2 tabs every day or as directed 120 Tab 3    omeprazole (PRILOSEC) 20 mg capsule TAKE 1 CAPSULE BY MOUTH  DAILY 90 Cap 3    tamsulosin (FLOMAX) 0.4 mg capsule TAKE 1 CAPSULE BY MOUTH  DAILY 90 Cap 3    potassium chloride (K-DUR, KLOR-CON) 20 mEq tablet TAKE 1 TABLET BY MOUTH  DAILY 90 Tab 4    Insulin Needles, Disposable, (BD ULTRA-FINE MINI PEN NEEDLE) 31 gauge x 3/16\" ndle Testing TID as directed, E11.9 100 Pen Needle 12    lancets 33 gauge misc Test BS TID/PRN Dx E11.9 2 Package 12    pen needle, diabetic 33 gauge x 3/16\" ndle Test bs TID/PRN Dx E11.9 pt uses easy fine one touch delica 972 Pen Needle 12    insulin aspart (NOVOLOG) 100 unit/mL injection by SubCUTAneous route as needed. 6 am, lunch 4 ; 6 supper-plus few units by scale if high      metOLazone (ZAROXOLYN) 2.5 mg tablet Take 1 Tab by mouth as needed. 90 Tab 1    aspirin delayed-release 81 mg tablet Take 81 mg by mouth daily.       multivitamin (ONE A DAY) tablet Take 1 Tab by mouth daily.  insulin glargine (LANTUS) 100 unit/mL injection 16 Units by SubCUTAneous route. As directed       ferrous sulfate (IRON) 325 mg (65 mg iron) tablet Take 65 mg by mouth Daily (before breakfast). Objective:     Physical Exam:     Visit Vitals  /68 (BP 1 Location: Right arm)   Pulse 73   Temp 98.1 °F (36.7 °C)   Resp 18   Ht 5' 10\" (1.778 m)   Wt 105.1 kg (231 lb 12.8 oz)   SpO2 99%   BMI 33.26 kg/m²       General: well developed, well nourished  Psych: cooperative. Pleasant  Neuro: alert and oriented to person/place/situation. Otherwise nonfocal.  Derm: Normal turgor for age, dry skin  HEENT: Normocephalic, atraumatic. EOMI. Neck: Normal range of motion.   Chest: Respirations nonlabored  Cardio: irreg irreg  Abdomen: Soft, nondistended  Lower extremities:   Bilateral hemosidderosis  Significant varicosities L>R  Capillary refill <3 sec    Right  2+ DP/PT    Left  2+ DP/PT                Assessment/Plan:     Close dc

## 2019-07-31 PROBLEM — M10.9 GOUT OF WRIST: Status: ACTIVE | Noted: 2019-01-01

## 2019-08-09 NOTE — PROGRESS NOTES
Wound Center Progress Note Patient: Ciara Vides MRN: [id number]  SSN: [id number] YOB: 1935  Age: 81 y.o. Sex: male Subjective: Chief Complaint: 
 
RLE ulcer History of Present Illness:    
 
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 No pain or claudication issues. Happy with wound progress. Mainting exercise regimen and active lifestyle. No claudication pain. INR stable. Past Medical History:  
Diagnosis Date  Arthritis  Atrial fibrillation ([address] chronic  CAD (coronary artery disease)  Cancer ([address] skin  DM (diabetes mellitus), type 2 ([address]  Family history of prostate problems  GERD (gastroesophageal reflux disease)  Hearing difficulty  HLD (hyperlipidemia)  Hypertension  Hypothyroidism  Insulin dependent diabetes mellitus ([address]  Kidney problem  Systolic heart failure ([address] Past Surgical History:  
Procedure Laterality Date  CARDIAC SURG PROCEDURE UNLIST  06/2007 Triple By pass  CARDIAC SURG PROCEDURE UNLIST  11/14/2011 Pacemaker  CARDIAC SURG PROCEDURE UNLIST  05/10/2013 Bi-ventricular defibrillator  HX AORTIC VALVE REPLACEMENT  11/10/2011  HX APPENDECTOMY  08/2004  HX CATARACT REMOVAL  04/2011  HX CORONARY ARTERY BYPASS GRAFT    
 HX HERNIA REPAIR  08/2004  HX KNEE REPLACEMENT  04/25/2016  
 right [address] Achilles  HX OTHER SURGICAL    
 prostate surgery  HX OTHER SURGICAL    
 bi-ventricular pacemaker  HX PROSTATECTOMY  03/01/2006  HX TONSILLECTOMY Family History Problem Relation Age of Onset [place] Other Mother mitral valve disease  Heart Failure Father  No Known Problems Sister Social History Tobacco Use  Smoking status: Never Smoker  Smokeless tobacco: Never Used Substance Use Topics  Alcohol use: No  
  Comment: occ wine and beer Prior to Admission medications Medication Sig Start Date End Date Taking? Authorizing Provider  
glucose blood VI test strips (ONETOUCH ULTRA BLUE TEST STRIP) strip USE TO TEST THREE TIMES DAILY AND AS NEEDED, E11.9 6/20/19   Millie Vásquez MD  
colchicine (MITIGARE) 0.6 mg capsule Take 1 Cap by mouth daily. 6/18/19   Joe Valerio MD  
Bifidobacterium Infantis (ALIGN) 4 mg cap Take  by mouth. Provider, Historical  
diphenhydramine HCl (BENADRYL ALLERGY PO) Take  by mouth. Provider, Historical  
guaifenesin (ROBITUSSIN CHEST CONGESTION PO) Take  by mouth. OTC medication sugar    Provider, Historical  
levothyroxine (SYNTHROID) 125 mcg tablet TAKE 1 TABLET BY MOUTH  DAILY BEFORE BREAKFAST 6/1/19   Kimberlee Gallegos MD  
lisinopril (PRINIVIL, ZESTRIL) 2.5 mg tablet TAKE 1 TABLET BY MOUTH  DAILY 6/1/19   Kimberlee Gallegos MD  
pravastatin (PRAVACHOL) 20 mg tablet Take 1 Tab by mouth nightly.  Due For Labs 4/4/19   Hernandez Batista MD  
furosemide (LASIX) 40 mg tablet TAKE 1 TABLET BY MOUTH TWO  TIMES DAILY 1/21/19   Joe Valerio MD  
carvedilol (COREG) 6.25 mg tablet TAKE 1 TABLET BY MOUTH TWO  TIMES DAILY WITH MEALS 12/19/18   Joe Valerio MD  
COUMADIN 5 mg tablet 1 to 1&1/2 tabs every day or as directed 11/9/18   Hernandez Batista MD  
omeprazole (PRILOSEC) 20 mg capsule TAKE 1 CAPSULE BY MOUTH  DAILY 10/30/18   Joe Valerio MD  
tamsulosin (FLOMAX) 0.4 mg capsule TAKE 1 CAPSULE BY MOUTH  DAILY 10/21/18   Joe Valerio MD  
potassium chloride (K-DUR, KLOR-CON) 20 mEq tablet TAKE 1 TABLET BY MOUTH  DAILY 10/7/18   Joe Valerio MD  
Insulin Needles, Disposable, (BD ULTRA-FINE MINI PEN NEEDLE) 31 gauge x 3/16\" ndle Testing TID as directed, E11.9 8/21/18   Tom Hicks MD  
lancets 33 gauge misc Test BS TID/PRN Dx E11.9 8/21/18   Tom Hicks MD  
pen needle, diabetic 33 gauge x 3/16\" ndle Test bs TID/PRN Dx E11.9 pt uses easy fine one touch delica 6/51/21   Rigo Lopez, NP  
insulin aspart (NOVOLOG) 100 unit/mL injection by SubCUTAneous route as needed. 6 am, lunch 4 ; 6 supper-plus few units by scale if high    Provider, Historical  
metOLazone (ZAROXOLYN) 2.5 mg tablet Take 1 Tab by mouth as needed. 6/5/17   RANDY Bowers  
aspirin delayed-release 81 mg tablet Take 81 mg by mouth daily. Provider, Historical  
multivitamin (ONE A DAY) tablet Take 1 Tab by mouth daily. Provider, Historical  
insulin glargine (LANTUS) 100 unit/mL injection 16 Units by SubCUTAneous route. As directed     Provider, Historical  
ferrous sulfate (IRON) 325 mg (65 mg iron) tablet Take 65 mg by mouth Daily (before breakfast). Provider, Historical  
 
Allergies Allergen Reactions  Latex Rash  Adhesive Rash Review of Systems: 
CONSTITUTIONAL: No fever, chills HEAD: No headache EYES: + visual loss ENT: + hearing loss SKIN: No rash CARDIOVASCULAR: No chest pain. + afib RESPIRATORY: No shortness of breath GASTROINTESTINAL: No nausea, vomiting GENITOURINARY: No excessive urination NEUROLOGICAL: No weakness MUSCULOSKELETAL: No muscle pain. No neck pain HEMATOLOGIC: No easy bleeding LYMPHATICS: No lymphedema. PSYCHIATRIC: No current depression ENDOCRINOLOGIC: No high sugars ALLERGIES: No history of asthma, hives, eczema or rhinitis. Lab Results Component Value Date/Time Hemoglobin A1c 7.1 (H) 07/31/2019 02:55 PM  
  
 
Immunization History Administered Date(s) Administered  Influenza High Dose Vaccine PF 09/15/2016, 09/08/2017, 10/09/2018  Pneumococcal Conjugate (PCV-13) 04/02/2015  Pneumococcal Polysaccharide (PPSV-23) 04/22/2016  Tdap 05/22/2013  Zoster Vaccine, Live 01/22/2011 Body mass index is 33.15 kg/m². Current medications: 
Current Outpatient Medications Medication Sig Dispense Refill  glucose blood VI test strips (ONETOUCH ULTRA BLUE TEST STRIP) strip USE TO TEST THREE TIMES DAILY AND AS NEEDED, E11.9 300 Strip 4  
 colchicine (MITIGARE) 0.6 mg capsule Take 1 Cap by mouth daily. 30 Cap 0  Bifidobacterium Infantis (ALIGN) 4 mg cap Take  by mouth.  diphenhydramine HCl (BENADRYL ALLERGY PO) Take  by mouth.  guaifenesin (ROBITUSSIN CHEST CONGESTION PO) Take  by mouth. OTC medication sugar  levothyroxine (SYNTHROID) 125 mcg tablet TAKE 1 TABLET BY MOUTH  DAILY BEFORE BREAKFAST 10 Tab 1  
 lisinopril (PRINIVIL, ZESTRIL) 2.5 mg tablet TAKE 1 TABLET BY MOUTH  DAILY 10 Tab 1  pravastatin (PRAVACHOL) 20 mg tablet Take 1 Tab by mouth nightly. Due For Labs 90 Tab 3  furosemide (LASIX) 40 mg tablet TAKE 1 TABLET BY MOUTH TWO  TIMES DAILY 180 Tab 3  carvedilol (COREG) 6.25 mg tablet TAKE 1 TABLET BY MOUTH TWO  TIMES DAILY WITH MEALS 180 Tab 3  
 COUMADIN 5 mg tablet 1 to 1&1/2 tabs every day or as directed 120 Tab 3  
 omeprazole (PRILOSEC) 20 mg capsule TAKE 1 CAPSULE BY MOUTH  DAILY 90 Cap 3  
 tamsulosin (FLOMAX) 0.4 mg capsule TAKE 1 CAPSULE BY MOUTH  DAILY 90 Cap 3  potassium chloride (K-DUR, KLOR-CON) 20 mEq tablet TAKE 1 TABLET BY MOUTH  DAILY 90 Tab 4  
 Insulin Needles, Disposable, (BD ULTRA-FINE MINI PEN NEEDLE) 31 gauge x 3/16\" ndle Testing TID as directed, E11.9 100 Pen Needle 12  
 lancets 33 gauge misc Test BS TID/PRN Dx E11.9 2 Package 12  
 pen needle, diabetic 33 gauge x 3/16\" ndle Test bs TID/PRN Dx E11.9 pt uses easy fine one touch delica 231 Pen Needle 12  
 insulin aspart (NOVOLOG) 100 unit/mL injection by SubCUTAneous route as needed. 6 am, lunch 4 ; 6 supper-plus few units by scale if high  metOLazone (ZAROXOLYN) 2.5 mg tablet Take 1 Tab by mouth as needed.  90 Tab 1  
  aspirin delayed-release 81 mg tablet Take 81 mg by mouth daily.  multivitamin (ONE A DAY) tablet Take 1 Tab by mouth daily.  insulin glargine (LANTUS) 100 unit/mL injection 16 Units by SubCUTAneous route. As directed  ferrous sulfate (IRON) 325 mg (65 mg iron) tablet Take 65 mg by mouth Daily (before breakfast). Objective:  
 
Physical Exam:  
 
Visit Vitals /52 (BP 1 Location: Left arm) Pulse 64 Temp 97.6 °F (36.4 °C) Resp 18 Ht 5' 10\" (1.778 m) Wt 104.8 kg (231 lb) SpO2 97% BMI 33.15 kg/m² General: well developed, well nourished Psych: cooperative. Pleasant Neuro: alert and oriented to person/place/situation. Otherwise nonfocal. 
Derm: Normal turgor for age, dry skin HEENT: Normocephalic, atraumatic. EOMI. Neck: Normal range of motion. Chest: Respirations nonlabored Cardio: irreg irreg Abdomen: Soft, nondistended Lower extremities:  
Bilateral hemosidderosis Significant varicosities L>R Capillary refill <3 sec Right 2+ DP/PT Left 2+ DP/PT Assessment/Plan: BLE venous insufficiency with right leg ulcer, no sign of cellulitis. Continue physician directed multilayer compression and hydrafera. Return if still open in 2 weeks. Has compression hose.

## 2019-08-09 NOTE — PROGRESS NOTES
Wound Center Progress Note Patient: Kell Martínez MRN: [id number]  SSN: [id number] YOB: 1935  Age: 81 y.o. Sex: male Subjective: Chief Complaint: 
 
RLE ulcer History of Present Illness:    
 
Wound Caused By: initial trauma - longstanding post phlebitic syndrome Associated Signs and Symptoms: some drainage, pain Timing: constant since ~ aug 2018 Quality: wound Severity: full thickness Modifying Factors: dm2, significant cardiac issues Blood sugars checked regularly 10/12/2019 Tolerating compression well. No pain or claudication issues. Happy with wound progress. Past Medical History:  
Diagnosis Date  Arthritis  Atrial fibrillation ([address] chronic  CAD (coronary artery disease)  Cancer ([address] skin  DM (diabetes mellitus), type 2 ([address]  Family history of prostate problems  GERD (gastroesophageal reflux disease)  Hearing difficulty  HLD (hyperlipidemia)  Hypertension  Hypothyroidism  Insulin dependent diabetes mellitus ([address]  Kidney problem  Systolic heart failure ([address] Past Surgical History:  
Procedure Laterality Date  CARDIAC SURG PROCEDURE UNLIST  06/2007 Triple By pass  CARDIAC SURG PROCEDURE UNLIST  11/14/2011 Pacemaker  CARDIAC SURG PROCEDURE UNLIST  05/10/2013 Bi-ventricular defibrillator  HX AORTIC VALVE REPLACEMENT  11/10/2011  HX APPENDECTOMY  08/2004  HX CATARACT REMOVAL  04/2011  HX CORONARY ARTERY BYPASS GRAFT    
 HX HERNIA REPAIR  08/2004  HX KNEE REPLACEMENT  04/25/2016  
 right [place] Achilles  HX OTHER SURGICAL    
 prostate surgery  HX OTHER SURGICAL    
 bi-ventricular pacemaker  HX PROSTATECTOMY  03/01/2006  HX TONSILLECTOMY Family History Problem Relation Age of Onset  Other Mother   
     mitral valve disease  Heart Failure Father  No Known Problems Sister Social History Tobacco Use  Smoking status: Never Smoker  Smokeless tobacco: Never Used Substance Use Topics  Alcohol use: No  
  Comment: occ wine and beer Prior to Admission medications Medication Sig Start Date End Date Taking? Authorizing Provider  
glucose blood VI test strips (ONETOUCH ULTRA BLUE TEST STRIP) strip USE TO TEST THREE TIMES DAILY AND AS NEEDED, E11.9 6/20/19   Darcie Campbell MD  
colchicine (MITIGARE) 0.6 mg capsule Take 1 Cap by mouth daily. 6/18/19   Patti Roth MD  
Bifidobacterium Infantis (ALIGN) 4 mg cap Take  by mouth. Provider, Historical  
diphenhydramine HCl (BENADRYL ALLERGY PO) Take  by mouth. Provider, Historical  
guaifenesin (ROBITUSSIN CHEST CONGESTION PO) Take  by mouth. OTC medication sugar    Provider, Historical  
levothyroxine (SYNTHROID) 125 mcg tablet TAKE 1 TABLET BY MOUTH  DAILY BEFORE BREAKFAST 6/1/19   Akua Sinclair MD  
lisinopril (PRINIVIL, ZESTRIL) 2.5 mg tablet TAKE 1 TABLET BY MOUTH  DAILY 6/1/19   Akua Sinclair MD  
pravastatin (PRAVACHOL) 20 mg tablet Take 1 Tab by mouth nightly.  Due For Labs 4/4/19   Whitley Cortes MD  
furosemide (LASIX) 40 mg tablet TAKE 1 TABLET BY MOUTH TWO  TIMES DAILY 1/21/19   Patti Roth MD  
carvedilol (COREG) 6.25 mg tablet TAKE 1 TABLET BY MOUTH TWO  TIMES DAILY WITH MEALS 12/19/18   Patti Roth MD  
COUMADIN 5 mg tablet 1 to 1&1/2 tabs every day or as directed 11/9/18   Whitley Cortes MD  
omeprazole (PRILOSEC) 20 mg capsule TAKE 1 CAPSULE BY MOUTH  DAILY 10/30/18   Patti Roth MD  
tamsulosin (FLOMAX) 0.4 mg capsule TAKE 1 CAPSULE BY MOUTH  DAILY 10/21/18   Patti Roth MD  
potassium chloride (K-DUR, KLOR-CON) 20 mEq tablet TAKE 1 TABLET BY MOUTH  DAILY 10/7/18   Patti Roth MD  
Insulin Needles, Disposable, (BD ULTRA-FINE MINI PEN NEEDLE) 31 gauge x 3/16\" ndle Testing TID as directed, E11.9 8/21/18   Patti Roth MD  
 lancets 33 gauge misc Test BS TID/PRN Dx E11.9 8/21/18   Yovani Alanis MD  
pen needle, diabetic 33 gauge x 3/16\" ndle Test bs TID/PRN Dx E11.9 pt uses easy fine one touch delica 2/53/77   Bertha Lopez, NP  
insulin aspart (NOVOLOG) 100 unit/mL injection by SubCUTAneous route as needed. 6 am, lunch 4 ; 6 supper-plus few units by scale if high    Provider, Historical  
metOLazone (ZAROXOLYN) 2.5 mg tablet Take 1 Tab by mouth as needed. 6/5/17   Mariah Veronica, PA  
aspirin delayed-release 81 mg tablet Take 81 mg by mouth daily. Provider, Historical  
multivitamin (ONE A DAY) tablet Take 1 Tab by mouth daily. Provider, Historical  
insulin glargine (LANTUS) 100 unit/mL injection 16 Units by SubCUTAneous route. As directed     Provider, Historical  
ferrous sulfate (IRON) 325 mg (65 mg iron) tablet Take 65 mg by mouth Daily (before breakfast). Provider, Historical  
 
Allergies Allergen Reactions  Latex Rash  Adhesive Rash Review of Systems: 
CONSTITUTIONAL: No fever, chills HEAD: No headache EYES: + visual loss ENT: + hearing loss SKIN: No rash CARDIOVASCULAR: No chest pain. + afib RESPIRATORY: No shortness of breath GASTROINTESTINAL: No nausea, vomiting GENITOURINARY: No excessive urination NEUROLOGICAL: No weakness MUSCULOSKELETAL: No muscle pain. No neck pain HEMATOLOGIC: No easy bleeding LYMPHATICS: No lymphedema. PSYCHIATRIC: No current depression ENDOCRINOLOGIC: No high sugars ALLERGIES: No history of asthma, hives, eczema or rhinitis. Lab Results Component Value Date/Time Hemoglobin A1c 7.1 (H) 07/31/2019 02:55 PM  
  
 
Immunization History Administered Date(s) Administered  Influenza High Dose Vaccine PF 09/15/2016, 09/08/2017, 10/09/2018  Pneumococcal Conjugate (PCV-13) 04/02/2015  Pneumococcal Polysaccharide (PPSV-23) 04/22/2016  Tdap 05/22/2013  Zoster Vaccine, Live 01/22/2011 Body mass index is 33.43 kg/m². Current medications: 
Current Outpatient Medications Medication Sig Dispense Refill  glucose blood VI test strips (ONETOUCH ULTRA BLUE TEST STRIP) strip USE TO TEST THREE TIMES DAILY AND AS NEEDED, E11.9 300 Strip 4  
 colchicine (MITIGARE) 0.6 mg capsule Take 1 Cap by mouth daily. 30 Cap 0  Bifidobacterium Infantis (ALIGN) 4 mg cap Take  by mouth.  diphenhydramine HCl (BENADRYL ALLERGY PO) Take  by mouth.  guaifenesin (ROBITUSSIN CHEST CONGESTION PO) Take  by mouth. OTC medication sugar  levothyroxine (SYNTHROID) 125 mcg tablet TAKE 1 TABLET BY MOUTH  DAILY BEFORE BREAKFAST 10 Tab 1  
 lisinopril (PRINIVIL, ZESTRIL) 2.5 mg tablet TAKE 1 TABLET BY MOUTH  DAILY 10 Tab 1  pravastatin (PRAVACHOL) 20 mg tablet Take 1 Tab by mouth nightly. Due For Labs 90 Tab 3  furosemide (LASIX) 40 mg tablet TAKE 1 TABLET BY MOUTH TWO  TIMES DAILY 180 Tab 3  carvedilol (COREG) 6.25 mg tablet TAKE 1 TABLET BY MOUTH TWO  TIMES DAILY WITH MEALS 180 Tab 3  
 COUMADIN 5 mg tablet 1 to 1&1/2 tabs every day or as directed 120 Tab 3  
 omeprazole (PRILOSEC) 20 mg capsule TAKE 1 CAPSULE BY MOUTH  DAILY 90 Cap 3  
 tamsulosin (FLOMAX) 0.4 mg capsule TAKE 1 CAPSULE BY MOUTH  DAILY 90 Cap 3  potassium chloride (K-DUR, KLOR-CON) 20 mEq tablet TAKE 1 TABLET BY MOUTH  DAILY 90 Tab 4  
 Insulin Needles, Disposable, (BD ULTRA-FINE MINI PEN NEEDLE) 31 gauge x 3/16\" ndle Testing TID as directed, E11.9 100 Pen Needle 12  
 lancets 33 gauge misc Test BS TID/PRN Dx E11.9 2 Package 12  
 pen needle, diabetic 33 gauge x 3/16\" ndle Test bs TID/PRN Dx E11.9 pt uses easy fine one touch delica 515 Pen Needle 12  
 insulin aspart (NOVOLOG) 100 unit/mL injection by SubCUTAneous route as needed. 6 am, lunch 4 ; 6 supper-plus few units by scale if high  metOLazone (ZAROXOLYN) 2.5 mg tablet Take 1 Tab by mouth as needed. 90 Tab 1  
 aspirin delayed-release 81 mg tablet Take 81 mg by mouth daily.  multivitamin (ONE A DAY) tablet Take 1 Tab by mouth daily.  insulin glargine (LANTUS) 100 unit/mL injection 16 Units by SubCUTAneous route. As directed  ferrous sulfate (IRON) 325 mg (65 mg iron) tablet Take 65 mg by mouth Daily (before breakfast). Objective:  
 
Physical Exam:  
 
Visit Vitals BP (!) 150/93 (BP 1 Location: Left arm) Pulse 66 Temp 97.9 °F (36.6 °C) Resp 18 Ht 5' 10\" (1.778 m) SpO2 99% BMI 33.43 kg/m² General: well developed, well nourished Psych: cooperative. Pleasant Neuro: alert and oriented to person/place/situation. Otherwise nonfocal. 
Derm: Normal turgor for age, dry skin HEENT: Normocephalic, atraumatic. EOMI. Neck: Normal range of motion. Chest: Respirations nonlabored Cardio: irreg irreg Abdomen: Soft, nondistended Lower extremities:  
Bilateral hemosidderosis Significant varicosities L>R Capillary refill <3 sec Right 2+ DP/PT Left 2+ DP/PT Assessment/Plan: BLE venous insufficiency with right leg ulcer, no sign of cellulitis. Continue physician directed multilayer compression and hydrafera. Return if still open

## 2019-10-14 PROBLEM — Z85.828 PERSONAL HISTORY OF OTHER MALIGNANT NEOPLASM OF SKIN: Status: ACTIVE | Noted: 2019-01-01

## 2019-10-14 PROBLEM — L57.0 ACTINIC KERATOSIS: Status: ACTIVE | Noted: 2019-01-01

## 2019-10-14 PROBLEM — L82.1 OTHER SEBORRHEIC KERATOSIS: Status: ACTIVE | Noted: 2019-01-01

## 2019-12-04 PROBLEM — Z85.828 PERSONAL HISTORY OF OTHER MALIGNANT NEOPLASM OF SKIN: Status: ACTIVE | Noted: 2019-01-01

## 2019-12-04 PROBLEM — L57.0 ACTINIC KERATOSIS: Status: ACTIVE | Noted: 2019-01-01

## 2020-01-01 ENCOUNTER — APPOINTMENT (OUTPATIENT)
Dept: GENERAL RADIOLOGY | Age: 85
DRG: 871 | End: 2020-01-01
Attending: FAMILY MEDICINE
Payer: MEDICARE

## 2020-01-01 ENCOUNTER — APPOINTMENT (RX ONLY)
Dept: URBAN - METROPOLITAN AREA CLINIC 349 | Facility: CLINIC | Age: 85
Setting detail: DERMATOLOGY
End: 2020-01-01

## 2020-01-01 ENCOUNTER — HOSPITAL ENCOUNTER (INPATIENT)
Age: 85
LOS: 2 days | DRG: 871 | End: 2020-01-18
Attending: EMERGENCY MEDICINE | Admitting: FAMILY MEDICINE
Payer: MEDICARE

## 2020-01-01 ENCOUNTER — APPOINTMENT (OUTPATIENT)
Dept: ULTRASOUND IMAGING | Age: 85
DRG: 871 | End: 2020-01-01
Attending: INTERNAL MEDICINE
Payer: MEDICARE

## 2020-01-01 ENCOUNTER — HOSPITAL ENCOUNTER (OUTPATIENT)
Dept: LAB | Age: 85
Discharge: HOME | End: 2020-01-09
Attending: INTERNAL MEDICINE
Payer: MEDICARE

## 2020-01-01 VITALS
TEMPERATURE: 98.9 F | OXYGEN SATURATION: 71 % | SYSTOLIC BLOOD PRESSURE: 69 MMHG | BODY MASS INDEX: 34.22 KG/M2 | DIASTOLIC BLOOD PRESSURE: 41 MMHG | WEIGHT: 239 LBS | HEIGHT: 70 IN

## 2020-01-01 DIAGNOSIS — L29.89 OTHER PRURITUS: ICD-10-CM

## 2020-01-01 DIAGNOSIS — M31.0 HYPERSENSITIVITY ANGIITIS: ICD-10-CM

## 2020-01-01 DIAGNOSIS — R21 RASH AND NONSPECIFIC SKIN ERUPTION: ICD-10-CM

## 2020-01-01 DIAGNOSIS — L29.8 OTHER PRURITUS: ICD-10-CM

## 2020-01-01 DIAGNOSIS — R79.1 ELEVATED INR: ICD-10-CM

## 2020-01-01 DIAGNOSIS — N17.9 ACUTE KIDNEY INJURY (HCC): Primary | ICD-10-CM

## 2020-01-01 LAB
ACC. NO. FROM MICRO ORDER, ACCP: ABNORMAL
ALBUMIN SERPL-MCNC: 2 G/DL (ref 3.2–4.6)
ALBUMIN SERPL-MCNC: 3 G/DL (ref 3.2–4.6)
ALBUMIN/GLOB SERPL: 0.5 {RATIO} (ref 1.2–3.5)
ALBUMIN/GLOB SERPL: 0.7 {RATIO} (ref 1.2–3.5)
ALP SERPL-CCNC: 322 U/L (ref 50–136)
ALP SERPL-CCNC: 539 U/L (ref 50–136)
ALT SERPL-CCNC: 120 U/L (ref 12–65)
ALT SERPL-CCNC: 360 U/L (ref 12–65)
ANA SER QL: NEGATIVE
ANION GAP SERPL CALC-SCNC: 11 MMOL/L (ref 7–16)
ANION GAP SERPL CALC-SCNC: 12 MMOL/L (ref 7–16)
ANION GAP SERPL CALC-SCNC: 13 MMOL/L (ref 7–16)
APPEARANCE UR: ABNORMAL
APPEARANCE UR: CLEAR
ARTERIAL PATENCY WRIST A: YES
AST SERPL-CCNC: 200 U/L (ref 15–37)
AST SERPL-CCNC: 406 U/L (ref 15–37)
BACTERIA URNS QL MICRO: 0 /HPF
BACTERIA URNS QL MICRO: ABNORMAL /HPF
BASE DEFICIT BLD-SCNC: 10 MMOL/L
BASOPHILS # BLD: 0 K/UL (ref 0–0.2)
BASOPHILS # BLD: 0 K/UL (ref 0–0.2)
BASOPHILS # BLD: 0.1 K/UL (ref 0–0.2)
BASOPHILS NFR BLD: 0 % (ref 0–2)
BASOPHILS NFR BLD: 1 % (ref 0–2)
BASOPHILS NFR BLD: 1 % (ref 0–2)
BDY SITE: ABNORMAL
BILIRUB SERPL-MCNC: 7.3 MG/DL (ref 0.2–1.1)
BILIRUB SERPL-MCNC: 7.8 MG/DL (ref 0.2–1.1)
BILIRUB UR QL: ABNORMAL
BILIRUB UR QL: NEGATIVE
BUN SERPL-MCNC: 111 MG/DL (ref 8–23)
BUN SERPL-MCNC: 88 MG/DL (ref 8–23)
BUN SERPL-MCNC: 97 MG/DL (ref 8–23)
CALCIUM SERPL-MCNC: 7.5 MG/DL (ref 8.3–10.4)
CALCIUM SERPL-MCNC: 8.3 MG/DL (ref 8.3–10.4)
CALCIUM SERPL-MCNC: 8.6 MG/DL (ref 8.3–10.4)
CASTS URNS QL MICRO: ABNORMAL /LPF
CHLORIDE SERPL-SCNC: 104 MMOL/L (ref 98–107)
CHLORIDE SERPL-SCNC: 104 MMOL/L (ref 98–107)
CHLORIDE SERPL-SCNC: 106 MMOL/L (ref 98–107)
CLINICAL CONSIDERATION: ABNORMAL
CO2 BLD-SCNC: 20 MMOL/L
CO2 SERPL-SCNC: 17 MMOL/L (ref 21–32)
CO2 SERPL-SCNC: 20 MMOL/L (ref 21–32)
CO2 SERPL-SCNC: 22 MMOL/L (ref 21–32)
COLLECT TIME,HTIME: 319
COLOR UR: ABNORMAL
COLOR UR: YELLOW
CREAT SERPL-MCNC: 2.52 MG/DL (ref 0.8–1.5)
CREAT SERPL-MCNC: 3.24 MG/DL (ref 0.8–1.5)
CREAT SERPL-MCNC: 4.01 MG/DL (ref 0.8–1.5)
CREAT UR-MCNC: 98 MG/DL
DIFFERENTIAL METHOD BLD: ABNORMAL
EOSINOPHIL # BLD: 0.1 K/UL (ref 0–0.8)
EOSINOPHIL # BLD: 0.1 K/UL (ref 0–0.8)
EOSINOPHIL # BLD: 0.2 K/UL (ref 0–0.8)
EOSINOPHIL # BLD: 0.3 K/UL (ref 0–0.8)
EOSINOPHIL NFR BLD MANUAL: 1 % (ref 1–8)
EOSINOPHIL NFR BLD: 1 % (ref 0.5–7.8)
EOSINOPHIL NFR BLD: 2 % (ref 0.5–7.8)
EOSINOPHIL NFR BLD: 2 % (ref 0.5–7.8)
EPI CELLS #/AREA URNS HPF: ABNORMAL /HPF
EPI CELLS #/AREA URNS HPF: ABNORMAL /HPF
ERYTHROCYTE [DISTWIDTH] IN BLOOD BY AUTOMATED COUNT: 16.5 % (ref 11.9–14.6)
ERYTHROCYTE [DISTWIDTH] IN BLOOD BY AUTOMATED COUNT: 16.5 % (ref 11.9–14.6)
ERYTHROCYTE [DISTWIDTH] IN BLOOD BY AUTOMATED COUNT: 16.6 % (ref 11.9–14.6)
ERYTHROCYTE [DISTWIDTH] IN BLOOD BY AUTOMATED COUNT: 17.4 % (ref 11.9–14.6)
ERYTHROCYTE [SEDIMENTATION RATE] IN BLOOD: 77 MM/HR (ref 0–20)
FLOW RATE ISTAT,IFRATE: 15 L/MIN
FLUAV AG NPH QL IA: NEGATIVE
FLUBV AG NPH QL IA: NEGATIVE
GAS FLOW.O2 O2 DELIVERY SYS: ABNORMAL L/MIN
GLOBULIN SER CALC-MCNC: 3.8 G/DL (ref 2.3–3.5)
GLOBULIN SER CALC-MCNC: 4.2 G/DL (ref 2.3–3.5)
GLUCOSE BLD STRIP.AUTO-MCNC: 111 MG/DL (ref 65–100)
GLUCOSE BLD STRIP.AUTO-MCNC: 120 MG/DL (ref 65–100)
GLUCOSE BLD STRIP.AUTO-MCNC: 131 MG/DL (ref 65–100)
GLUCOSE BLD STRIP.AUTO-MCNC: 181 MG/DL (ref 65–100)
GLUCOSE BLD STRIP.AUTO-MCNC: 188 MG/DL (ref 65–100)
GLUCOSE BLD STRIP.AUTO-MCNC: 50 MG/DL (ref 65–100)
GLUCOSE SERPL-MCNC: 111 MG/DL (ref 65–100)
GLUCOSE SERPL-MCNC: 169 MG/DL (ref 65–100)
GLUCOSE SERPL-MCNC: 69 MG/DL (ref 65–100)
GLUCOSE UR STRIP.AUTO-MCNC: NEGATIVE MG/DL
GLUCOSE UR STRIP.AUTO-MCNC: NEGATIVE MG/DL
HCO3 BLD-SCNC: 18.6 MMOL/L (ref 22–26)
HCT VFR BLD AUTO: 35.7 % (ref 41.1–50.3)
HCT VFR BLD AUTO: 36.1 % (ref 41.1–50.3)
HCT VFR BLD AUTO: 37 % (ref 41.1–50.3)
HCT VFR BLD AUTO: 39.8 % (ref 41.1–50.3)
HGB BLD-MCNC: 11.8 G/DL (ref 13.6–17.2)
HGB BLD-MCNC: 12.2 G/DL (ref 13.6–17.2)
HGB BLD-MCNC: 12.5 G/DL (ref 13.6–17.2)
HGB BLD-MCNC: 12.8 G/DL (ref 13.6–17.2)
HGB UR QL STRIP: ABNORMAL
HGB UR QL STRIP: ABNORMAL
IMM GRANULOCYTES # BLD AUTO: 0 K/UL (ref 0–0.5)
IMM GRANULOCYTES # BLD AUTO: 0.1 K/UL (ref 0–0.5)
IMM GRANULOCYTES # BLD AUTO: 0.6 K/UL (ref 0–0.5)
IMM GRANULOCYTES NFR BLD AUTO: 0 % (ref 0–5)
IMM GRANULOCYTES NFR BLD AUTO: 1 % (ref 0–5)
IMM GRANULOCYTES NFR BLD AUTO: 3 % (ref 0–5)
INR PPP: 4.9
INR PPP: >6.8
INR PPP: >6.8
INTERPRETATION: ABNORMAL
KETONES UR QL STRIP.AUTO: ABNORMAL MG/DL
KETONES UR QL STRIP.AUTO: NEGATIVE MG/DL
LACTATE BLD-SCNC: 3.17 MMOL/L (ref 0.5–1.9)
LACTATE SERPL-SCNC: 3 MMOL/L (ref 0.4–2)
LACTATE SERPL-SCNC: 3.4 MMOL/L (ref 0.4–2)
LACTATE SERPL-SCNC: 3.4 MMOL/L (ref 0.4–2)
LACTATE SERPL-SCNC: 5.3 MMOL/L (ref 0.4–2)
LEUKOCYTE ESTERASE UR QL STRIP.AUTO: ABNORMAL
LEUKOCYTE ESTERASE UR QL STRIP.AUTO: ABNORMAL
LIPASE SERPL-CCNC: 6743 U/L (ref 73–393)
LYMPHOCYTES # BLD: 0.1 K/UL (ref 0.5–4.6)
LYMPHOCYTES # BLD: 0.3 K/UL (ref 0.5–4.6)
LYMPHOCYTES # BLD: 0.4 K/UL (ref 0.5–4.6)
LYMPHOCYTES # BLD: 0.7 K/UL (ref 0.5–4.6)
LYMPHOCYTES NFR BLD MANUAL: 1 % (ref 16–44)
LYMPHOCYTES NFR BLD: 2 % (ref 13–44)
LYMPHOCYTES NFR BLD: 3 % (ref 13–44)
LYMPHOCYTES NFR BLD: 5 % (ref 13–44)
MAGNESIUM SERPL-MCNC: 2.3 MG/DL (ref 1.8–2.4)
MCH RBC QN AUTO: 31.7 PG (ref 26.1–32.9)
MCH RBC QN AUTO: 31.9 PG (ref 26.1–32.9)
MCH RBC QN AUTO: 32.1 PG (ref 26.1–32.9)
MCH RBC QN AUTO: 32.2 PG (ref 26.1–32.9)
MCHC RBC AUTO-ENTMCNC: 32.2 G/DL (ref 31.4–35)
MCHC RBC AUTO-ENTMCNC: 33.1 G/DL (ref 31.4–35)
MCHC RBC AUTO-ENTMCNC: 33.8 G/DL (ref 31.4–35)
MCHC RBC AUTO-ENTMCNC: 33.8 G/DL (ref 31.4–35)
MCV RBC AUTO: 94.9 FL (ref 79.6–97.8)
MCV RBC AUTO: 95.3 FL (ref 79.6–97.8)
MCV RBC AUTO: 96.5 FL (ref 79.6–97.8)
MCV RBC AUTO: 98.5 FL (ref 79.6–97.8)
MM INDURATION POC: 0 MM (ref 0–5)
MONOCYTES # BLD: 0.4 K/UL (ref 0.1–1.3)
MONOCYTES # BLD: 0.5 K/UL (ref 0.1–1.3)
MONOCYTES # BLD: 0.8 K/UL (ref 0.1–1.3)
MONOCYTES NFR BLD: 2 % (ref 4–12)
MONOCYTES NFR BLD: 4 % (ref 4–12)
MONOCYTES NFR BLD: 7 % (ref 4–12)
NEUTS BAND NFR BLD MANUAL: 29 % (ref 0–6)
NEUTS SEG # BLD: 10.7 K/UL (ref 1.7–8.2)
NEUTS SEG # BLD: 18.2 K/UL (ref 1.7–8.2)
NEUTS SEG # BLD: 19.2 K/UL (ref 1.7–8.2)
NEUTS SEG # BLD: 4.8 K/UL (ref 1.7–8.2)
NEUTS SEG NFR BLD MANUAL: 69 % (ref 47–75)
NEUTS SEG NFR BLD: 84 % (ref 43–78)
NEUTS SEG NFR BLD: 90 % (ref 43–78)
NEUTS SEG NFR BLD: 91 % (ref 43–78)
NITRITE UR QL STRIP.AUTO: NEGATIVE
NITRITE UR QL STRIP.AUTO: POSITIVE
NRBC # BLD: 0 K/UL (ref 0–0.2)
NRBC # BLD: 0.03 K/UL (ref 0–0.2)
NRBC # BLD: 0.03 K/UL (ref 0–0.2)
NRBC # BLD: 0.04 K/UL (ref 0–0.2)
PCO2 BLD: 52.9 MMHG (ref 35–45)
PH BLD: 7.15 [PH] (ref 7.35–7.45)
PH UR STRIP: 5 [PH] (ref 5–9)
PH UR STRIP: 5.5 [PH] (ref 5–9)
PLATELET # BLD AUTO: 173 K/UL (ref 150–450)
PLATELET # BLD AUTO: 188 K/UL (ref 150–450)
PLATELET # BLD AUTO: 201 K/UL (ref 150–450)
PLATELET # BLD AUTO: 241 K/UL (ref 150–450)
PLATELET COMMENTS,PCOM: ABNORMAL
PLATELET COMMENTS,PCOM: ADEQUATE
PMV BLD AUTO: 10.6 FL (ref 9.4–12.3)
PMV BLD AUTO: 12 FL (ref 9.4–12.3)
PMV BLD AUTO: 12.3 FL (ref 9.4–12.3)
PMV BLD AUTO: 13.1 FL (ref 9.4–12.3)
PO2 BLD: 108 MMHG (ref 75–100)
POTASSIUM SERPL-SCNC: 4.7 MMOL/L (ref 3.5–5.1)
POTASSIUM SERPL-SCNC: 4.9 MMOL/L (ref 3.5–5.1)
POTASSIUM SERPL-SCNC: 5.9 MMOL/L (ref 3.5–5.1)
PPD POC: NEGATIVE NEGATIVE
PROT SERPL-MCNC: 5.8 G/DL (ref 6.3–8.2)
PROT SERPL-MCNC: 7.2 G/DL (ref 6.3–8.2)
PROT UR STRIP-MCNC: 30 MG/DL
PROT UR STRIP-MCNC: ABNORMAL MG/DL
PROT UR-MCNC: 126 MG/DL
PROT/CREAT UR-RTO: 1.3
PROTHROMBIN TIME: 101.2 SEC (ref 11.7–14.5)
PROTHROMBIN TIME: 47.1 SEC (ref 11.7–14.5)
PROTHROMBIN TIME: 65 SEC (ref 11.7–14.5)
RBC # BLD AUTO: 3.7 M/UL (ref 4.23–5.6)
RBC # BLD AUTO: 3.79 M/UL (ref 4.23–5.6)
RBC # BLD AUTO: 3.9 M/UL (ref 4.23–5.6)
RBC # BLD AUTO: 4.04 M/UL (ref 4.23–5.6)
RBC #/AREA URNS HPF: ABNORMAL /HPF
RBC #/AREA URNS HPF: ABNORMAL /HPF
RBC MORPH BLD: ABNORMAL
RBC MORPH BLD: ABNORMAL
SAO2 % BLD: 96 % (ref 95–98)
SERVICE CMNT-IMP: ABNORMAL
SODIUM SERPL-SCNC: 136 MMOL/L (ref 136–145)
SODIUM SERPL-SCNC: 136 MMOL/L (ref 136–145)
SODIUM SERPL-SCNC: 137 MMOL/L (ref 136–145)
SP GR UR REFRACTOMETRY: 1.01 (ref 1–1.02)
SP GR UR REFRACTOMETRY: 1.02 (ref 1–1.02)
SPECIMEN SOURCE: NORMAL
SPECIMEN TYPE: ABNORMAL
STREPTOCOCCUS , STPSP: DETECTED
T4 FREE SERPL-MCNC: 1.4 NG/DL (ref 0.78–1.46)
TSH SERPL DL<=0.005 MIU/L-ACNC: 4.79 UIU/ML
UROBILINOGEN UR QL STRIP.AUTO: 1 EU/DL (ref 0.2–1)
UROBILINOGEN UR QL STRIP.AUTO: 1 EU/DL (ref 0.2–1)
WBC # BLD AUTO: 10.9 K/UL (ref 4.3–11.1)
WBC # BLD AUTO: 19.9 K/UL (ref 4.3–11.1)
WBC # BLD AUTO: 21.2 K/UL (ref 4.3–11.1)
WBC # BLD AUTO: 5.7 K/UL (ref 4.3–11.1)
WBC MORPH BLD: ABNORMAL
WBC MORPH BLD: ABNORMAL
WBC URNS QL MICRO: ABNORMAL /HPF
WBC URNS QL MICRO: ABNORMAL /HPF

## 2020-01-01 PROCEDURE — 85025 COMPLETE CBC W/AUTO DIFF WBC: CPT

## 2020-01-01 PROCEDURE — 74011250637 HC RX REV CODE- 250/637: Performed by: FAMILY MEDICINE

## 2020-01-01 PROCEDURE — 87804 INFLUENZA ASSAY W/OPTIC: CPT

## 2020-01-01 PROCEDURE — 81001 URINALYSIS AUTO W/SCOPE: CPT

## 2020-01-01 PROCEDURE — 83605 ASSAY OF LACTIC ACID: CPT

## 2020-01-01 PROCEDURE — ? RECOMMENDATIONS

## 2020-01-01 PROCEDURE — 74011000258 HC RX REV CODE- 258: Performed by: FAMILY MEDICINE

## 2020-01-01 PROCEDURE — 74011250636 HC RX REV CODE- 250/636: Performed by: FAMILY MEDICINE

## 2020-01-01 PROCEDURE — 87086 URINE CULTURE/COLONY COUNT: CPT

## 2020-01-01 PROCEDURE — 65270000029 HC RM PRIVATE

## 2020-01-01 PROCEDURE — 71045 X-RAY EXAM CHEST 1 VIEW: CPT

## 2020-01-01 PROCEDURE — 74011000250 HC RX REV CODE- 250: Performed by: FAMILY MEDICINE

## 2020-01-01 PROCEDURE — 83735 ASSAY OF MAGNESIUM: CPT

## 2020-01-01 PROCEDURE — 74011000302 HC RX REV CODE- 302: Performed by: FAMILY MEDICINE

## 2020-01-01 PROCEDURE — 36600 WITHDRAWAL OF ARTERIAL BLOOD: CPT

## 2020-01-01 PROCEDURE — 87186 SC STD MICRODIL/AGAR DIL: CPT

## 2020-01-01 PROCEDURE — 74011250636 HC RX REV CODE- 250/636: Performed by: EMERGENCY MEDICINE

## 2020-01-01 PROCEDURE — 97161 PT EVAL LOW COMPLEX 20 MIN: CPT

## 2020-01-01 PROCEDURE — 36415 COLL VENOUS BLD VENIPUNCTURE: CPT

## 2020-01-01 PROCEDURE — 76705 ECHO EXAM OF ABDOMEN: CPT

## 2020-01-01 PROCEDURE — ? INTRAMUSCULAR KENALOG

## 2020-01-01 PROCEDURE — 77030021668 HC NEB PREFIL KT VYRM -A

## 2020-01-01 PROCEDURE — 74018 RADEX ABDOMEN 1 VIEW: CPT

## 2020-01-01 PROCEDURE — 87077 CULTURE AEROBIC IDENTIFY: CPT

## 2020-01-01 PROCEDURE — 87040 BLOOD CULTURE FOR BACTERIA: CPT

## 2020-01-01 PROCEDURE — 74011000258 HC RX REV CODE- 258: Performed by: INTERNAL MEDICINE

## 2020-01-01 PROCEDURE — 80048 BASIC METABOLIC PNL TOTAL CA: CPT

## 2020-01-01 PROCEDURE — 77030012793 HC CIRC VNTLTR FISP -B

## 2020-01-01 PROCEDURE — 99213 OFFICE O/P EST LOW 20 MIN: CPT | Mod: 25

## 2020-01-01 PROCEDURE — 77030020263 HC SOL INJ SOD CL0.9% LFCR 1000ML

## 2020-01-01 PROCEDURE — 94660 CPAP INITIATION&MGMT: CPT

## 2020-01-01 PROCEDURE — 97165 OT EVAL LOW COMPLEX 30 MIN: CPT

## 2020-01-01 PROCEDURE — 85610 PROTHROMBIN TIME: CPT

## 2020-01-01 PROCEDURE — 82803 BLOOD GASES ANY COMBINATION: CPT

## 2020-01-01 PROCEDURE — 82962 GLUCOSE BLOOD TEST: CPT

## 2020-01-01 PROCEDURE — 84443 ASSAY THYROID STIM HORMONE: CPT

## 2020-01-01 PROCEDURE — 85652 RBC SED RATE AUTOMATED: CPT

## 2020-01-01 PROCEDURE — 74011250636 HC RX REV CODE- 250/636

## 2020-01-01 PROCEDURE — 86580 TB INTRADERMAL TEST: CPT | Performed by: FAMILY MEDICINE

## 2020-01-01 PROCEDURE — 77030027138 HC INCENT SPIROMETER -A

## 2020-01-01 PROCEDURE — 74011000250 HC RX REV CODE- 250: Performed by: INTERNAL MEDICINE

## 2020-01-01 PROCEDURE — 83690 ASSAY OF LIPASE: CPT

## 2020-01-01 PROCEDURE — 87153 DNA/RNA SEQUENCING: CPT

## 2020-01-01 PROCEDURE — 74011250636 HC RX REV CODE- 250/636: Performed by: INTERNAL MEDICINE

## 2020-01-01 PROCEDURE — 77030013032 HC MSK BPAP/CPAP FISP -B

## 2020-01-01 PROCEDURE — 80053 COMPREHEN METABOLIC PANEL: CPT

## 2020-01-01 PROCEDURE — 87150 DNA/RNA AMPLIFIED PROBE: CPT

## 2020-01-01 PROCEDURE — 77030020255 HC SOL INJ LR 1000ML BG

## 2020-01-01 PROCEDURE — ? PRESCRIPTION

## 2020-01-01 PROCEDURE — 84439 ASSAY OF FREE THYROXINE: CPT

## 2020-01-01 PROCEDURE — 96372 THER/PROPH/DIAG INJ SC/IM: CPT

## 2020-01-01 PROCEDURE — ? COUNSELING

## 2020-01-01 PROCEDURE — 77030012794 HC KT NSL CANN/HGH TRAN -A

## 2020-01-01 PROCEDURE — 86038 ANTINUCLEAR ANTIBODIES: CPT

## 2020-01-01 PROCEDURE — 77030040830 HC CATH URETH FOL MDII -A

## 2020-01-01 PROCEDURE — 87076 CULTURE ANAEROBE IDENT EACH: CPT

## 2020-01-01 PROCEDURE — 96374 THER/PROPH/DIAG INJ IV PUSH: CPT | Performed by: EMERGENCY MEDICINE

## 2020-01-01 PROCEDURE — 65660000000 HC RM CCU STEPDOWN

## 2020-01-01 PROCEDURE — 84156 ASSAY OF PROTEIN URINE: CPT

## 2020-01-01 PROCEDURE — 74011250637 HC RX REV CODE- 250/637: Performed by: EMERGENCY MEDICINE

## 2020-01-01 PROCEDURE — 99284 EMERGENCY DEPT VISIT MOD MDM: CPT | Performed by: EMERGENCY MEDICINE

## 2020-01-01 PROCEDURE — 87205 SMEAR GRAM STAIN: CPT

## 2020-01-01 RX ORDER — DOPAMINE HYDROCHLORIDE 320 MG/100ML
INJECTION, SOLUTION INTRAVENOUS
Status: COMPLETED
Start: 2020-01-01 | End: 2020-01-01

## 2020-01-01 RX ORDER — SODIUM CHLORIDE, SODIUM LACTATE, POTASSIUM CHLORIDE, CALCIUM CHLORIDE 600; 310; 30; 20 MG/100ML; MG/100ML; MG/100ML; MG/100ML
50 INJECTION, SOLUTION INTRAVENOUS CONTINUOUS
Status: DISCONTINUED | OUTPATIENT
Start: 2020-01-01 | End: 2020-01-01

## 2020-01-01 RX ORDER — CARVEDILOL 6.25 MG/1
6.25 TABLET ORAL 2 TIMES DAILY WITH MEALS
Status: DISCONTINUED | OUTPATIENT
Start: 2020-01-01 | End: 2020-01-01 | Stop reason: HOSPADM

## 2020-01-01 RX ORDER — LORAZEPAM 2 MG/ML
1 INJECTION INTRAMUSCULAR
Status: DISCONTINUED | OUTPATIENT
Start: 2020-01-01 | End: 2020-01-01 | Stop reason: HOSPADM

## 2020-01-01 RX ORDER — ASPIRIN 81 MG/1
81 TABLET ORAL DAILY
Status: DISCONTINUED | OUTPATIENT
Start: 2020-01-01 | End: 2020-01-01 | Stop reason: HOSPADM

## 2020-01-01 RX ORDER — MORPHINE SULFATE 2 MG/ML
2 INJECTION, SOLUTION INTRAMUSCULAR; INTRAVENOUS
Status: DISCONTINUED | OUTPATIENT
Start: 2020-01-01 | End: 2020-01-01 | Stop reason: HOSPADM

## 2020-01-01 RX ORDER — HYDROCODONE BITARTRATE AND ACETAMINOPHEN 5; 325 MG/1; MG/1
1 TABLET ORAL
Status: DISCONTINUED | OUTPATIENT
Start: 2020-01-01 | End: 2020-01-01 | Stop reason: HOSPADM

## 2020-01-01 RX ORDER — TRIAMCINOLONE ACETONIDE 1 MG/G
CREAM TOPICAL
Qty: 1 | Refills: 1 | Status: ERX | COMMUNITY
Start: 2020-01-01

## 2020-01-01 RX ORDER — NOREPINEPHRINE BITARTRATE/D5W 4MG/250ML
.5-16 PLASTIC BAG, INJECTION (ML) INTRAVENOUS
Status: DISCONTINUED | OUTPATIENT
Start: 2020-01-01 | End: 2020-01-01 | Stop reason: HOSPADM

## 2020-01-01 RX ORDER — SODIUM CHLORIDE 9 MG/ML
50 INJECTION, SOLUTION INTRAVENOUS CONTINUOUS
Status: DISCONTINUED | OUTPATIENT
Start: 2020-01-01 | End: 2020-01-01

## 2020-01-01 RX ORDER — VASOPRESSIN 20 U/ML
INJECTION PARENTERAL
Status: DISCONTINUED
Start: 2020-01-01 | End: 2020-01-01 | Stop reason: HOSPADM

## 2020-01-01 RX ORDER — COLCHICINE 0.6 MG/1
20 TABLET ORAL DAILY
COMMUNITY

## 2020-01-01 RX ORDER — ONDANSETRON 2 MG/ML
4 INJECTION INTRAMUSCULAR; INTRAVENOUS
Status: DISCONTINUED | OUTPATIENT
Start: 2020-01-01 | End: 2020-01-01 | Stop reason: HOSPADM

## 2020-01-01 RX ORDER — DICLOFENAC SODIUM 10 MG/G
2 GEL TOPICAL
Status: DISCONTINUED | OUTPATIENT
Start: 2020-01-01 | End: 2020-01-01 | Stop reason: HOSPADM

## 2020-01-01 RX ORDER — DOPAMINE HYDROCHLORIDE 320 MG/100ML
2-30 INJECTION, SOLUTION INTRAVENOUS
Status: DISCONTINUED | OUTPATIENT
Start: 2020-01-01 | End: 2020-01-01 | Stop reason: HOSPADM

## 2020-01-01 RX ORDER — MORPHINE SULFATE 2 MG/ML
INJECTION, SOLUTION INTRAMUSCULAR; INTRAVENOUS
Status: DISCONTINUED
Start: 2020-01-01 | End: 2020-01-01 | Stop reason: HOSPADM

## 2020-01-01 RX ORDER — PHYTONADIONE 5 MG/1
2.5 TABLET ORAL
Status: COMPLETED | OUTPATIENT
Start: 2020-01-01 | End: 2020-01-01

## 2020-01-01 RX ORDER — LANOLIN ALCOHOL/MO/W.PET/CERES
325 CREAM (GRAM) TOPICAL
Status: DISCONTINUED | OUTPATIENT
Start: 2020-01-01 | End: 2020-01-01 | Stop reason: HOSPADM

## 2020-01-01 RX ORDER — NOREPINEPHRINE BITARTRATE/D5W 4MG/250ML
PLASTIC BAG, INJECTION (ML) INTRAVENOUS
Status: DISCONTINUED
Start: 2020-01-01 | End: 2020-01-01 | Stop reason: HOSPADM

## 2020-01-01 RX ORDER — SODIUM CHLORIDE 9 MG/ML
125 INJECTION, SOLUTION INTRAVENOUS CONTINUOUS
Status: DISCONTINUED | OUTPATIENT
Start: 2020-01-01 | End: 2020-01-01 | Stop reason: HOSPADM

## 2020-01-01 RX ORDER — LEVOTHYROXINE SODIUM 125 UG/1
125 TABLET ORAL
Status: DISCONTINUED | OUTPATIENT
Start: 2020-01-01 | End: 2020-01-01 | Stop reason: HOSPADM

## 2020-01-01 RX ORDER — ACETAMINOPHEN 325 MG/1
650 TABLET ORAL
Status: DISCONTINUED | OUTPATIENT
Start: 2020-01-01 | End: 2020-01-01 | Stop reason: HOSPADM

## 2020-01-01 RX ORDER — PRAVASTATIN SODIUM 20 MG/1
20 TABLET ORAL DAILY
Status: DISCONTINUED | OUTPATIENT
Start: 2020-01-01 | End: 2020-01-01 | Stop reason: HOSPADM

## 2020-01-01 RX ORDER — DEXTROSE 50 % IN WATER (D50W) INTRAVENOUS SYRINGE
25
Status: COMPLETED | OUTPATIENT
Start: 2020-01-01 | End: 2020-01-01

## 2020-01-01 RX ORDER — DIPHENHYDRAMINE HCL 25 MG
25 CAPSULE ORAL
Status: DISCONTINUED | OUTPATIENT
Start: 2020-01-01 | End: 2020-01-01 | Stop reason: HOSPADM

## 2020-01-01 RX ORDER — NALOXONE HYDROCHLORIDE 0.4 MG/ML
0.4 INJECTION, SOLUTION INTRAMUSCULAR; INTRAVENOUS; SUBCUTANEOUS AS NEEDED
Status: DISCONTINUED | OUTPATIENT
Start: 2020-01-01 | End: 2020-01-01 | Stop reason: HOSPADM

## 2020-01-01 RX ORDER — PANTOPRAZOLE SODIUM 40 MG/1
40 TABLET, DELAYED RELEASE ORAL DAILY
Status: DISCONTINUED | OUTPATIENT
Start: 2020-01-01 | End: 2020-01-01 | Stop reason: HOSPADM

## 2020-01-01 RX ORDER — INSULIN LISPRO 100 [IU]/ML
INJECTION, SOLUTION INTRAVENOUS; SUBCUTANEOUS
Status: DISCONTINUED | OUTPATIENT
Start: 2020-01-01 | End: 2020-01-01 | Stop reason: HOSPADM

## 2020-01-01 RX ORDER — SODIUM BICARBONATE 1 MEQ/ML
100 SYRINGE (ML) INTRAVENOUS ONCE
Status: COMPLETED | OUTPATIENT
Start: 2020-01-01 | End: 2020-01-01

## 2020-01-01 RX ORDER — ONDANSETRON 2 MG/ML
4 INJECTION INTRAMUSCULAR; INTRAVENOUS
Status: COMPLETED | OUTPATIENT
Start: 2020-01-01 | End: 2020-01-01

## 2020-01-01 RX ORDER — SODIUM CHLORIDE 9 MG/ML
150 INJECTION, SOLUTION INTRAVENOUS CONTINUOUS
Status: DISCONTINUED | OUTPATIENT
Start: 2020-01-01 | End: 2020-01-01

## 2020-01-01 RX ADMIN — PRAVASTATIN SODIUM 20 MG: 20 TABLET ORAL at 07:57

## 2020-01-01 RX ADMIN — PIPERACILLIN AND TAZOBACTAM 3.38 G: 3; .375 INJECTION, POWDER, LYOPHILIZED, FOR SOLUTION INTRAVENOUS at 04:27

## 2020-01-01 RX ADMIN — PIPERACILLIN AND TAZOBACTAM 3.38 G: 3; .375 INJECTION, POWDER, LYOPHILIZED, FOR SOLUTION INTRAVENOUS at 07:56

## 2020-01-01 RX ADMIN — ONDANSETRON 4 MG: 2 INJECTION INTRAMUSCULAR; INTRAVENOUS at 08:05

## 2020-01-01 RX ADMIN — PANTOPRAZOLE SODIUM 40 MG: 40 TABLET, DELAYED RELEASE ORAL at 07:57

## 2020-01-01 RX ADMIN — CEFTRIAXONE SODIUM 2 G: 2 INJECTION, POWDER, FOR SOLUTION INTRAMUSCULAR; INTRAVENOUS at 13:42

## 2020-01-01 RX ADMIN — SODIUM CHLORIDE 500 ML: 900 INJECTION, SOLUTION INTRAVENOUS at 08:05

## 2020-01-01 RX ADMIN — DOPAMINE HYDROCHLORIDE 5 MCG/KG/MIN: 320 INJECTION, SOLUTION INTRAVENOUS at 05:23

## 2020-01-01 RX ADMIN — SODIUM CHLORIDE 1000 ML: 900 INJECTION, SOLUTION INTRAVENOUS at 02:44

## 2020-01-01 RX ADMIN — LEVOTHYROXINE SODIUM 125 MCG: 125 TABLET ORAL at 06:08

## 2020-01-01 RX ADMIN — ONDANSETRON 4 MG: 2 INJECTION INTRAMUSCULAR; INTRAVENOUS at 19:41

## 2020-01-01 RX ADMIN — SODIUM CHLORIDE 1000 ML: 900 INJECTION, SOLUTION INTRAVENOUS at 04:28

## 2020-01-01 RX ADMIN — TUBERCULIN PURIFIED PROTEIN DERIVATIVE 5 UNITS: 5 INJECTION INTRADERMAL at 20:57

## 2020-01-01 RX ADMIN — NOREPINEPHRINE-DEXTROSE IV SOLUTION 4 MG/250ML-5% 4 MCG/MIN: 4-5/250 SOLUTION at 03:37

## 2020-01-01 RX ADMIN — ONDANSETRON 4 MG: 2 INJECTION INTRAMUSCULAR; INTRAVENOUS at 16:50

## 2020-01-01 RX ADMIN — SODIUM CHLORIDE 50 ML/HR: 900 INJECTION, SOLUTION INTRAVENOUS at 20:59

## 2020-01-01 RX ADMIN — TRIAMCINOLONE ACETONIDE: 1 CREAM TOPICAL at 00:00

## 2020-01-01 RX ADMIN — SODIUM CHLORIDE 500 ML: 900 INJECTION, SOLUTION INTRAVENOUS at 10:02

## 2020-01-01 RX ADMIN — ACETAMINOPHEN 650 MG: 325 TABLET, FILM COATED ORAL at 21:39

## 2020-01-01 RX ADMIN — Medication 100 MEQ: at 04:26

## 2020-01-01 RX ADMIN — ACETAMINOPHEN ORAL SOLUTION 650 MG: 325 SOLUTION ORAL at 16:50

## 2020-01-01 RX ADMIN — FERROUS SULFATE TAB 325 MG (65 MG ELEMENTAL FE) 325 MG: 325 (65 FE) TAB at 07:57

## 2020-01-01 RX ADMIN — SODIUM CHLORIDE 500 ML: 900 INJECTION, SOLUTION INTRAVENOUS at 11:42

## 2020-01-01 RX ADMIN — MORPHINE SULFATE 2 MG: 2 INJECTION, SOLUTION INTRAMUSCULAR; INTRAVENOUS at 05:44

## 2020-01-01 RX ADMIN — PHYTONADIONE 2.5 MG: 5 TABLET ORAL at 12:03

## 2020-01-01 RX ADMIN — SODIUM CHLORIDE, SODIUM LACTATE, POTASSIUM CHLORIDE, AND CALCIUM CHLORIDE 50 ML/HR: 600; 310; 30; 20 INJECTION, SOLUTION INTRAVENOUS at 14:02

## 2020-01-01 RX ADMIN — ASPIRIN 81 MG: 81 TABLET ORAL at 07:58

## 2020-01-01 RX ADMIN — CARVEDILOL 6.25 MG: 6.25 TABLET, FILM COATED ORAL at 20:58

## 2020-01-01 RX ADMIN — ONDANSETRON 4 MG: 2 INJECTION INTRAMUSCULAR; INTRAVENOUS at 03:49

## 2020-01-01 RX ADMIN — SODIUM CHLORIDE 150 ML/HR: 900 INJECTION, SOLUTION INTRAVENOUS at 16:50

## 2020-01-01 RX ADMIN — ONDANSETRON 4 MG: 2 INJECTION INTRAMUSCULAR; INTRAVENOUS at 14:37

## 2020-01-01 RX ADMIN — DEXTROSE 50 % IN WATER (D50W) INTRAVENOUS SYRINGE 25 G: at 20:54

## 2020-01-01 ASSESSMENT — LOCATION SIMPLE DESCRIPTION DERM: LOCATION SIMPLE: RIGHT BUTTOCK

## 2020-01-01 ASSESSMENT — LOCATION DETAILED DESCRIPTION DERM: LOCATION DETAILED: RIGHT BUTTOCK

## 2020-01-01 ASSESSMENT — LOCATION ZONE DERM: LOCATION ZONE: TRUNK

## 2020-01-10 PROBLEM — M31.0 HYPERSENSITIVITY ANGIITIS: Status: ACTIVE | Noted: 2020-01-01

## 2020-01-10 PROBLEM — L29.8 OTHER PRURITUS: Status: ACTIVE | Noted: 2020-01-01

## 2020-01-10 NOTE — PROCEDURE: RECOMMENDATIONS
Detail Level: Detailed
Recommendations (Free Text): Patient was instructed to monitor glucose closely.

## 2020-01-10 NOTE — HPI: RASH
Is The Patient Presenting As Previously Scheduled?: No, they are a work-in
Is This A New Presentation, Or A Follow-Up?: Rash
Additional History: Patient states he is having an allergic reaction to new medication. Patient has been given prednisone by Dr Partida.

## 2020-01-16 PROBLEM — N17.9 AKI (ACUTE KIDNEY INJURY) (HCC): Status: ACTIVE | Noted: 2020-01-01

## 2020-01-16 PROBLEM — A08.4 VIRAL GASTROENTERITIS: Status: ACTIVE | Noted: 2020-01-01

## 2020-01-16 NOTE — PROGRESS NOTES
SW went to meet with patient to conduct a baseline assessment. Patient resting soundly. Sw called patient's wife Hany Villa, no answer. SW also looked in the waiting area with no success. JOSE plans to meet with the patient's wife upon arrival to help identify discharge needs. LESLIE TenorioW  [address] [facility] [email or web address]

## 2020-01-16 NOTE — ED PROVIDER NOTES
Patient is a somewhat debilitated appearing 72-year-old white male with past medical history significant for diabetes and chronic renal insufficiency. Apparently after recent treatment for gout he developed an allergic reaction with a severe vasculitic type rash. He was given some steroids and then these were discontinued according to the wife due to possible interaction with his Coumadin. The patient's blood sugar has been apparently very labile and he has been urinating frequently. No fever has been noted no diarrhea has been noted but over the past 2 days he is developing increasing generalized weakness. He had one episode of vomiting today but no history of diarrhea. The history is provided by the spouse and medical records. Fatigue This is a new problem. The current episode started 2 days ago. The problem has been gradually worsening. There was no focality noted. Pertinent negatives include no focal weakness, no loss of sensation, no loss of balance, no slurred speech, no speech difficulty, no memory loss, no movement disorder, no agitation, no visual change, no auditory change, no mental status change, no unresponsiveness and no disorientation. There has been no fever. The fever has been present for less than 1 day. Associated symptoms include vomiting and nausea. Pertinent negatives include no shortness of breath, no chest pain, no altered mental status, no confusion, no headaches, no choking, no bowel incontinence and no bladder incontinence. There were no medications administered prior to arrival.  
  
 
Past Medical History:  
Diagnosis Date  Arthritis  Atrial fibrillation ([facility] chronic  CAD (coronary artery disease)  [facility] ([facility] skin  DM (diabetes mellitus), type 2 ([facility]  Family history of prostate problems  GERD (gastroesophageal reflux disease)  Hearing difficulty  HLD (hyperlipidemia)  Hypertension  Hypothyroidism  Insulin dependent diabetes mellitus ([facility]  Kidney problem  Systolic heart failure ([facility] [facility] Past Surgical History:  
Procedure Laterality Date  CARDIAC SURG PROCEDURE UNLIST  06/2007 Triple By pass  CARDIAC SURG PROCEDURE UNLIST  11/14/2011 Pacemaker  CARDIAC SURG PROCEDURE UNLIST  05/10/2013 Bi-ventricular defibrillator  HX AORTIC VALVE REPLACEMENT  11/10/2011  HX APPENDECTOMY  08/2004  HX CATARACT REMOVAL  04/2011  HX CORONARY ARTERY BYPASS GRAFT    
 HX HERNIA REPAIR  08/2004  HX KNEE REPLACEMENT  04/25/2016  
 right [address] Achilles  HX OTHER SURGICAL    
 prostate surgery  HX OTHER SURGICAL    
 bi-ventricular pacemaker  HX PROSTATECTOMY  03/01/2006  HX TONSILLECTOMY Family History:  
Problem Relation Age of Onset  Other Mother   
     mitral valve disease  Heart Failure Father  No Known Problems Sister Social History Socioeconomic History  Marital status:  Spouse name: Not on file  Number of children: Not on file  Years of education: Not on file  Highest education level: Not on file Occupational History  Not on file Social Needs  Financial resource strain: Not on file  Food insecurity:  
  Worry: Not on file Inability: Not on file  Transportation needs:  
  Medical: Not on file Non-medical: Not on file Tobacco Use  Smoking status: Never Smoker  Smokeless tobacco: Never Used Substance and Sexual Activity  Alcohol use: No  
  Comment: occ wine and beer  Drug use: No  
 Sexual activity: Not on file Lifestyle  Physical activity:  
  Days per week: Not on file Minutes per session: Not on file  Stress: Not on file Relationships  Social connections:  
  Talks on phone: Not on file Gets together: Not on file Attends [employer] service: Not on file Active member of club or organization: Not on file Attends meetings of clubs or organizations: Not on file Relationship status: Not on file  Intimate partner violence:  
  Fear of current or ex partner: Not on file Emotionally abused: Not on file Physically abused: Not on file Forced sexual activity: Not on file Other Topics Concern  Not on file Social History Narrative  Not on file ALLERGIES: Latex; Adhesive; and Allopurinol Review of Systems Constitutional: Positive for fatigue. Respiratory: Negative for choking and shortness of breath. Cardiovascular: Negative for chest pain. Gastrointestinal: Positive for nausea and vomiting. Negative for bowel incontinence. Genitourinary: Negative for bladder incontinence. Neurological: Negative for focal weakness, speech difficulty, headaches and loss of balance. Psychiatric/Behavioral: Negative for agitation, confusion and memory loss. All other systems reviewed and are negative. Vitals:  
 01/16/20 1529 BP: 124/65 Pulse: 76 Resp: 20 Temp: 100.1 °F (37.8 °C) SpO2: 93% Weight: 97.5 kg (215 lb) Height: 5' 10\" (1.778 m) Physical Exam 
Vitals signs and nursing note reviewed. Constitutional:   
   General: He is not in acute distress. Appearance: Normal appearance. He is ill-appearing. He is not toxic-appearing or diaphoretic. HENT:  
   Head: Normocephalic and atraumatic. Mouth/Throat:  
   Mouth: Mucous membranes are dry. Eyes:  
   Extraocular Movements: Extraocular movements intact. Conjunctiva/sclera: Conjunctivae normal.  
   Pupils: Pupils are equal, round, and reactive to light. Neck: Musculoskeletal: Normal range of motion and neck supple. Cardiovascular:  
   Rate and Rhythm: Normal rate and regular rhythm. Pulses: Normal pulses. Heart sounds: Murmur present. Pulmonary:  
   Effort: Pulmonary effort is normal.  
   Breath sounds: Normal breath sounds. Abdominal: Tenderness: There is no tenderness. Musculoskeletal: Normal range of motion. Skin: 
   General: Skin is warm and dry. Capillary Refill: Capillary refill takes less than 2 seconds. Findings: Rash present. Comments: Vasculitic rash noted over the extremities and abdomen. Neurological:  
   General: No focal deficit present. Mental Status: He is alert and oriented to person, place, and time. Mental status is at baseline. Psychiatric:     
   Mood and Affect: Mood normal.     
   Behavior: Behavior normal.  
 
  
 
MDM Number of Diagnoses or Management Options Amount and/or Complexity of Data Reviewed Clinical lab tests: ordered and reviewed Review and summarize past medical records: yes Discuss the patient with other providers: yes Independent visualization of images, tracings, or specimens: yes Risk of Complications, Morbidity, and/or Mortality Presenting problems: high Diagnostic procedures: moderate Management options: moderate Patient Progress Patient progress: stable Procedures

## 2020-01-16 NOTE — H&P
Hospitalist Admission History and Physical  
 
NAME:  Dg Ogden Age:  81 y.o. 
:   1935 MRN:   [id number] PCP: Gloria Michelle MD 
Consulting MD: Treatment Team: : Gurinder Meyer Primary Nurse: Eryn Chand RN Chief Complaint Patient presents with  Vomiting  Diarrhea HPI:  
Patient is a 81 y.o. male who presented to the ED for cc intractable emesis, abdominal discomfort, and weakness. Hx of recent allergic reaction causing rash to allopurinol, HLD, HTN, hypothyroidism, DM type II, CKD stage III, a fib on warfarin, CAD s/p CABG, aortic valve replacement, and sCHF. Vitals - temp 100.1 Labs- INR 4.9, creatine 2.53 from baseline 2 Past Medical History:  
Diagnosis Date  Arthritis  Atrial fibrillation ([address] chronic  CAD (coronary artery disease)  Cancer ([address] skin  DM (diabetes mellitus), type 2 ([address]  Family history of prostate problems  GERD (gastroesophageal reflux disease)  Hearing difficulty  HLD (hyperlipidemia)  Hypertension  Hypothyroidism  Insulin dependent diabetes mellitus ([address]  Kidney problem  Systolic heart failure ([address] Past Surgical History:  
Procedure Laterality Date  CARDIAC SURG PROCEDURE UNLIST  2007 Triple By pass  CARDIAC SURG PROCEDURE UNLIST  2011 Pacemaker  CARDIAC SURG PROCEDURE UNLIST  05/10/2013 Bi-ventricular defibrillator  HX AORTIC VALVE REPLACEMENT  11/10/2011  HX APPENDECTOMY  2004  HX CATARACT REMOVAL  2011  HX CORONARY ARTERY BYPASS GRAFT    
 HX HERNIA REPAIR  2004  HX KNEE REPLACEMENT  2016  
 right [address] Achilles  HX OTHER SURGICAL    
 prostate surgery  HX OTHER SURGICAL    
 bi-ventricular pacemaker  HX PROSTATECTOMY  2006  HX TONSILLECTOMY Family History Problem Relation Age of Onset [facility] Other Mother mitral valve disease  Heart Failure Father  No Known Problems Sister Social History Patient does not qualify to have social determinant information on file (likely too young). Social History Narrative  Not on file Social History Tobacco Use  Smoking status: Never Smoker  Smokeless tobacco: Never Used Substance Use Topics  Alcohol use: No  
  Comment: occ wine and beer Social History Substance and Sexual Activity Drug Use No  
 
 
 
Allergies Allergen Reactions  Latex Rash  Adhesive Rash  Allopurinol Rash Prior to Admission medications Medication Sig Start Date End Date Taking? Authorizing Provider  
colchicine 0.6 mg tablet Take 20 mg by mouth daily. Yes Koffi, MD Rosario  
pravastatin (PRAVACHOL) 20 mg tablet TAKE 1 TABLET BY MOUTH  NIGHTLY 1/15/20  Yes Chelsi Reid MD  
furosemide (LASIX) 40 mg tablet TAKE 1 TABLET BY MOUTH TWO  TIMES DAILY 1/10/20  Yes Melanie Gilliam MD  
predniSONE (DELTASONE) 20 mg tablet 2x5d, 1 x5d 1/9/20  Yes Melanie Gilliam MD  
carvedilol (COREG) 6.25 mg tablet TAKE 1 TABLET BY MOUTH TWO  TIMES DAILY WITH MEALS 12/9/19  Yes Melanie Gilliam MD  
tamsulosin (FLOMAX) 0.4 mg capsule TAKE 1 CAPSULE BY MOUTH DAILY 12/6/19  Yes Melanie Gilliam MD  
potassium chloride (K-DUR, KLOR-CON) 20 mEq tablet Take 1 Tab by mouth daily. 11/11/19  Yes Melanie Gilliam MD  
diclofenac (VOLTAREN) 1 % gel Apply 1 g to affected area four (4) times daily.  8/12/19  Yes Melanie Gilliam MD  
levothyroxine (SYNTHROID) 125 mcg tablet TAKE 1 TABLET BY MOUTH  DAILY BEFORE BREAKFAST 6/1/19  Yes Yancey Ahumada, MD  
lisinopril (PRINIVIL, ZESTRIL) 2.5 mg tablet TAKE 1 TABLET BY MOUTH  DAILY 6/1/19  Yes Yancey Ahumada, MD  
COUMADIN 5 mg tablet 1 to 1&1/2 tabs every day or as directed 11/9/18  Yes Chelsi Reid MD  
omeprazole (PRILOSEC) 20 mg capsule TAKE 1 CAPSULE BY MOUTH  DAILY 10/30/18  Yes Chase Delgadillo MD  
Insulin Needles, Disposable, (BD ULTRA-FINE MINI PEN NEEDLE) 31 gauge x 3/16\" ndle Testing TID as directed, E11.9 8/21/18  Yes Clarissa Hernandes MD  
lancets 33 gauge misc Test BS TID/PRN Dx E11.9 8/21/18  Yes Clarissa Hernandes MD  
insulin aspart (NOVOLOG) 100 unit/mL injection by SubCUTAneous route as needed. 6 am, lunch 4 ; 6 supper-plus few units by scale if high   Yes Provider, Historical  
aspirin delayed-release 81 mg tablet Take 81 mg by mouth daily. Yes Provider, Historical  
multivitamin (ONE A DAY) tablet Take 1 Tab by mouth daily. Yes Provider, Historical  
insulin glargine (LANTUS) 100 unit/mL injection 16 Units by SubCUTAneous route. As directed    Yes Provider, Historical  
ferrous sulfate (IRON) 325 mg (65 mg iron) tablet Take 65 mg by mouth Daily (before breakfast). Yes Provider, Historical  
cyclobenzaprine (FLEXERIL) 5 mg tablet Take 1 Tab by mouth nightly. 8/12/19   Clarissa Hernandes MD  
glucose blood VI test strips (ONETOUCH ULTRA BLUE TEST STRIP) strip USE TO TEST THREE TIMES DAILY AND AS NEEDED, E11.9 6/20/19   Clarissa Hernandes MD  
pen needle, diabetic 33 gauge x 3/16\" ndle Test bs TID/PRN Dx E11.9 pt uses easy fine one touch delica 7/92/46   Ericka Lopez NP  
metOLazone (ZAROXOLYN) 2.5 mg tablet Take 1 Tab by mouth as needed. 6/5/17   RANDY Brannon Review of Systems Cannot obtain fully from patient due to lethargy. Wanting water. No SOB or chest pain. Objective:  
 
Visit Vitals /65 (BP 1 Location: Left arm, BP Patient Position: At rest) Pulse 76 Temp 100.1 °F (37.8 °C) Resp 20 Ht 5' 10\" (1.778 m) Wt 97.5 kg (215 lb) SpO2 94% BMI 30.85 kg/m² No intake/output data recorded. No intake/output data recorded. Data Review:  
Recent Results (from the past 24 hour(s)) CBC WITH AUTOMATED DIFF Collection Time: 01/16/20  4:55 PM  
Result Value Ref Range WBC 10.9 4.3 - 11.1 K/uL RBC 3.90 (L) 4.23 - 5.6 M/uL  
 HGB 12.5 (L) 13.6 - 17.2 g/dL HCT 37.0 (L) 41.1 - 50.3 % MCV 94.9 79.6 - 97.8 FL  
 MCH 32.1 26.1 - 32.9 PG  
 MCHC 33.8 31.4 - 35.0 g/dL  
 RDW 16.5 (H) 11.9 - 14.6 % PLATELET 789 359 - 772 K/uL MPV 12.0 9.4 - 12.3 FL ABSOLUTE NRBC 0.04 0.0 - 0.2 K/uL NEUTROPHILS 69 47 - 75 % BAND NEUTROPHILS 29 (H) 0 - 6 % LYMPHOCYTES 1 (L) 16 - 44 % EOSINOPHILS 1 1 - 8 %  
 ABS. NEUTROPHILS 10.7 (H) 1.7 - 8.2 K/UL  
 ABS. LYMPHOCYTES 0.1 (L) 0.5 - 4.6 K/UL  
 ABS. EOSINOPHILS 0.1 0.0 - 0.8 K/UL  
 RBC COMMENTS SLIGHT 
ANISOCYTOSIS + POIKILOCYTOSIS 
    
 WBC COMMENTS VACUOLATED POLYS PLATELET COMMENTS LARGE FORMS PRESENT    
 DF MANUAL METABOLIC PANEL, COMPREHENSIVE Collection Time: 01/16/20  4:55 PM  
Result Value Ref Range Sodium 137 136 - 145 mmol/L Potassium 4.9 3.5 - 5.1 mmol/L Chloride 104 98 - 107 mmol/L  
 CO2 22 21 - 32 mmol/L Anion gap 11 7 - 16 mmol/L Glucose 69 65 - 100 mg/dL BUN 88 (H) 8 - 23 MG/DL Creatinine 2.52 (H) 0.8 - 1.5 MG/DL  
 GFR est AA 32 (L) >60 ml/min/1.73m2 GFR est non-AA 26 (L) >60 ml/min/1.73m2 Calcium 8.6 8.3 - 10.4 MG/DL Bilirubin, total 7.8 (H) 0.2 - 1.1 MG/DL  
 ALT (SGPT) 120 (H) 12 - 65 U/L  
 AST (SGOT) 200 (H) 15 - 37 U/L Alk. phosphatase 539 (H) 50 - 136 U/L Protein, total 7.2 6.3 - 8.2 g/dL Albumin 3.0 (L) 3.2 - 4.6 g/dL Globulin 4.2 (H) 2.3 - 3.5 g/dL A-G Ratio 0.7 (L) 1.2 - 3.5 PROTHROMBIN TIME + INR Collection Time: 01/16/20  4:55 PM  
Result Value Ref Range Prothrombin time 47.1 (H) 11.7 - 14.5 sec INR 4.9 (HH) MAGNESIUM Collection Time: 01/16/20  4:55 PM  
Result Value Ref Range Magnesium 2.3 1.8 - 2.4 mg/dL URINALYSIS W/ RFLX MICROSCOPIC Collection Time: 01/16/20  4:55 PM  
Result Value Ref Range Color YELLOW Appearance CLEAR Specific gravity 1.012 1.001 - 1.023    
 pH (UA) 5.5 5.0 - 9.0 Protein TRACE (A) NEG mg/dL Glucose NEGATIVE  mg/dL Ketone NEGATIVE  NEG mg/dL Bilirubin NEGATIVE  NEG Blood MODERATE (A) NEG Urobilinogen 1.0 0.2 - 1.0 EU/dL Nitrites NEGATIVE  NEG Leukocyte Esterase TRACE (A) NEG    
 WBC 3-5 0 /hpf  
 RBC 10-20 0 /hpf Epithelial cells 0-3 0 /hpf Bacteria 0 0 /hpf Casts 0-3 0 /lpf  
TSH 3RD GENERATION Collection Time: 01/16/20  4:55 PM  
Result Value Ref Range TSH 4.790 uIU/mL POC LACTIC ACID Collection Time: 01/16/20  5:00 PM  
Result Value Ref Range Lactic Acid (POC) 3.17 (H) 0.5 - 1.9 mmol/L Physical Exam:  
 
General:  Alert, frail appearing. Says very little during exam but will answer appropriately Eyes:  Conjunctivae/corneas clear. Ears:  Normal TMs and external ear canals both ears. Nose: Nares normal. Septum midline. Mouth/Throat: Dry oral mucosa. Neck:  no JVD. Back:   deferred Lungs:   Clear to auscultation bilaterally. Limited breathe sounds. Heart:  Regular rate and rhythm, S1, S2 normal  
Abdomen:   Soft, non-tender. Bowel sounds normal. No masses,  No organomegaly. Extremities: Diffuse rash to LE bilaterally. Chronic venous stasis bilaterally Pulses: 2+ and symmetric all extremities. Skin: As above. Lymph nodes: Cervical, supraclavicular, and axillary nodes normal.  
Neurologic: Lethargic on exam but will answer appropriately Assessment and Plan Principal Problem: 
  [facility]) [facility] (1/16/2020) Active Problems: S/P CABG (coronary artery bypass graft) (9/23/2016) Overview: 2007 - after being found to have an abnormal EKG Chronic systolic heart failure ([facility] (9/23/2016) Overview: 2013 ([place]) - EF 19%, RVSP 52, severe MR Mar 2017 - EF 47%, mod to severe MR, RVSP 55 Feb 2018 - EF 45-50%, normal AVR, mild MR, RVSP 52 Feb 2019- EF 45-50%, normal AVR, mild MR, RVSP 49 Atrial fibrillation, chronic, permanent ([facility] (9/23/2016) Overview: rate controlled, anticoagulated Hypertensive disorder (11/22/2016) Acquired hypothyroidism (11/22/2016) Controlled type 2 diabetes mellitus with stage 2 chronic kidney disease, with long-term current use of insulin ([facility] (11/22/2016) Viral gastroenteritis (1/16/2020) TESSIE- Pre renal. Likely from dehydration. Give gentle hydration. Viral gastroenteritis - Suspecting viral gastroenteritis. Supra therapeutic INR - Likely from poor PO intake effecting metabolism of warfarin. Hold coumadin. Daily INR 
 
sCHF- EF 45% on last ECHO. Gentle hydration. Monitor for signs of volume overload. HTN- Hold BP medications due to poor PO intake other than BB. A fib - Hold INR. Goal INR 2.5-3.5. DM type II - hold lantus due to poor PO intake. SS alone. Rash - s/p steroid injection. Improving per the wife. HLD- statin Hypothyroidism - Levothyroxine Order rapid flu since very frail on exam.  
 
DNR 
 
DVT prophylaxis - SCDs Signed By: Yakelin Zavala DO  
January 16, 2020

## 2020-01-16 NOTE — PROGRESS NOTES
Location of Assessment: ER RM2 Socioevironmental: 
SW met with patient's wife Hany Villa ([phone]) who states that she and the patient live together alone, have a daughter but [place] the [place]. Patient is independent at his baseline. Ambulation/ADLs: 
Hany Villa states that the patient uses a walker or cane to ambulate, has a lift chair their bathroom is handicap equipped. Hany Villa denies any falls, states that he's been weaker recently due to a medication issue. The patient has had [facility] PT in the past to address arthritis pain. Primary Care: 
Patient sees Dr. Valarie Tidewll for primary care and is current. Needs:  
SW offered a [facility] referral for PT/OT/RN and Aide. Patient's wife declined at this time. SW to continue to follow additional workup for an new discharge needs. Alla Savgae, LESLIEW  [facility] [email or web address]

## 2020-01-16 NOTE — ED TRIAGE NOTES
Patient arrives via EMS from his residence complaining of nausea vomiting and diarrhea for the past three days. Today he was to weak to get off the toilet and needed assistance from EMS and fire. Patient is alert and oriented but has difficulty hearing.

## 2020-01-17 PROBLEM — R78.81 BACTEREMIA: Status: ACTIVE | Noted: 2020-01-01

## 2020-01-17 PROBLEM — R10.9 ABDOMINAL PAIN: Status: ACTIVE | Noted: 2020-01-01

## 2020-01-17 PROBLEM — A41.9 SEPSIS (HCC): Status: ACTIVE | Noted: 2020-01-01

## 2020-01-17 NOTE — PROGRESS NOTES
Change of shift report given to incoming nurse, Kobi Burroughs RN. Patient also seen by Nephrologist Dr. Nadia Abbasi with orders. Incoming nurse informed.

## 2020-01-17 NOTE — PROGRESS NOTES
Spiritual Care Visit, initial visit. Unable to visit because patient was having an ultrasound. Visit by Mauricio Costello, Staff .  Mtahew., Brad.SHEELA., B.A.

## 2020-01-17 NOTE — PROGRESS NOTES
Progress Note Patient: Candie Mathews MRN: [id number]  SSN: [id number] YOB: 1935  Age: 81 y.o. Sex: male Admit Date: 1/16/2020 LOS: 1 day Subjective: F/U Sepsis, gram negative jian bacteremia 81 y.o. male who presented to the ED for cc intractable emesis, abdominal discomfort, and weakness. Hx of recent allergic reaction causing rash to allopurinol, HLD, HTN, hypothyroidism, DM type II, CKD stage III, a fib on warfarin, CAD s/p CABG, aortic valve replacement, and sCHF. INR 4.9, creatine 2.53 from baseline 2 on admission. It was originally thought patient had viral gastroenteritis so supportive care started. Influenza negative. On 1/17/20 blood cultures growing gram negative rods and patient hypotensive. BP improving with bolus. WBC increased this AM. INR 6.8 Current Facility-Administered Medications Medication Dose Route Frequency  piperacillin-tazobactam (ZOSYN) 3.375 g in 0.9% sodium chloride (MBP/ADV) 100 mL  3.375 g IntraVENous Q12H  
 sodium chloride 0.9 % bolus infusion 500 mL  500 mL IntraVENous ONCE  phytonadione (vitamin K1) (MEPHYTON) tablet 2.5 mg  2.5 mg Oral NOW  acetaminophen (TYLENOL) tablet 650 mg  650 mg Oral Q4H PRN  
 HYDROcodone-acetaminophen (NORCO) 5-325 mg per tablet 1 Tab  1 Tab Oral Q4H PRN  
 naloxone (NARCAN) injection 0.4 mg  0.4 mg IntraVENous PRN  
 diphenhydrAMINE (BENADRYL) capsule 25 mg  25 mg Oral Q4H PRN  
 ondansetron (ZOFRAN) injection 4 mg  4 mg IntraVENous Q4H PRN  
 aspirin delayed-release tablet 81 mg  81 mg Oral DAILY  [Held by provider] carvediloL (COREG) tablet 6.25 mg  6.25 mg Oral BID WITH MEALS  diclofenac (VOLTAREN) 1 % topical gel 2 g (Patient Supplied)  2 g Topical QID PRN  
 ferrous sulfate tablet 325 mg  325 mg Oral ACB  levothyroxine (SYNTHROID) tablet 125 mcg  125 mcg Oral 6am  
 pravastatin (PRAVACHOL) tablet 20 mg  20 mg Oral DAILY  pantoprazole (PROTONIX) tablet 40 mg  40 mg Oral DAILY  insulin lispro (HUMALOG) injection   SubCUTAneous AC&HS  
 0.9% sodium chloride infusion  50 mL/hr IntraVENous CONTINUOUS  
 tuberculin injection 5 Units  5 Units IntraDERMal ONCE  Warfarin Intermentent Note- warfarin on hold, pharmacy to dose   Other PRN Objective:  
 
Vitals:  
 01/17/20 0335 01/17/20 0753 01/17/20 0948 01/17/20 1123 BP: 97/59 (!) 86/41 (!) 88/50 (!) 89/49 Pulse: 60 62 60 82 Resp: 18 18  18 Temp: 97.7 °F (36.5 °C) 96 °F (35.6 °C)  97.6 °F (36.4 °C) SpO2: 93% 91%  92% Weight:      
Height:      
  
  
Intake and Output: 
Current Shift: No intake/output data recorded. Last three shifts: No intake/output data recorded. Physical Exam:  
General:  Alert, cooperative, no distress, appears stated age. Talking more and moving more in the bed. Eyes:  Conjunctivae/corneas clear. Ears:  Normal TMs and external ear canals both ears. Nose: Nares normal. Septum midline. Mouth/Throat: Lips, mucosa, and tongue normal. Teeth and gums normal.  
Neck:  no JVD. Back:   deferred Lungs:   Clear to auscultation bilaterally. Heart:  irregularly irregular Abdomen:   +BS, mild distention. Tender to palpation with no rebound. More tender to palpation at epigastric area. Extremities: Extremities normal, atraumatic, no cyanosis or edema. Pulses: 2+ and symmetric all extremities. Skin: Diffuse rash to LE bilaterally Lymph nodes: Cervical, supraclavicular, and axillary nodes normal.  
Neurologic: CNII-XII intact. Lab/Data Review: 
 
Recent Results (from the past 24 hour(s)) CBC WITH AUTOMATED DIFF Collection Time: 01/16/20  4:55 PM  
Result Value Ref Range WBC 10.9 4.3 - 11.1 K/uL  
 RBC 3.90 (L) 4.23 - 5.6 M/uL  
 HGB 12.5 (L) 13.6 - 17.2 g/dL HCT 37.0 (L) 41.1 - 50.3 % MCV 94.9 79.6 - 97.8 FL  
 MCH 32.1 26.1 - 32.9 PG  
 MCHC 33.8 31.4 - 35.0 g/dL  
 RDW 16.5 (H) 11.9 - 14.6 % PLATELET 724 546 - 144 K/uL MPV 12.0 9.4 - 12.3 FL ABSOLUTE NRBC 0.04 0.0 - 0.2 K/uL NEUTROPHILS 69 47 - 75 % BAND NEUTROPHILS 29 (H) 0 - 6 % LYMPHOCYTES 1 (L) 16 - 44 % EOSINOPHILS 1 1 - 8 %  
 ABS. NEUTROPHILS 10.7 (H) 1.7 - 8.2 K/UL  
 ABS. LYMPHOCYTES 0.1 (L) 0.5 - 4.6 K/UL  
 ABS. EOSINOPHILS 0.1 0.0 - 0.8 K/UL  
 RBC COMMENTS SLIGHT 
ANISOCYTOSIS + POIKILOCYTOSIS 
    
 WBC COMMENTS VACUOLATED POLYS PLATELET COMMENTS LARGE FORMS PRESENT    
 DF MANUAL METABOLIC PANEL, COMPREHENSIVE Collection Time: 01/16/20  4:55 PM  
Result Value Ref Range Sodium 137 136 - 145 mmol/L Potassium 4.9 3.5 - 5.1 mmol/L Chloride 104 98 - 107 mmol/L  
 CO2 22 21 - 32 mmol/L Anion gap 11 7 - 16 mmol/L Glucose 69 65 - 100 mg/dL BUN 88 (H) 8 - 23 MG/DL Creatinine 2.52 (H) 0.8 - 1.5 MG/DL  
 GFR est AA 32 (L) >60 ml/min/1.73m2 GFR est non-AA 26 (L) >60 ml/min/1.73m2 Calcium 8.6 8.3 - 10.4 MG/DL Bilirubin, total 7.8 (H) 0.2 - 1.1 MG/DL  
 ALT (SGPT) 120 (H) 12 - 65 U/L  
 AST (SGOT) 200 (H) 15 - 37 U/L Alk. phosphatase 539 (H) 50 - 136 U/L Protein, total 7.2 6.3 - 8.2 g/dL Albumin 3.0 (L) 3.2 - 4.6 g/dL Globulin 4.2 (H) 2.3 - 3.5 g/dL A-G Ratio 0.7 (L) 1.2 - 3.5 PROTHROMBIN TIME + INR Collection Time: 01/16/20  4:55 PM  
Result Value Ref Range Prothrombin time 47.1 (H) 11.7 - 14.5 sec INR 4.9 (HH) MAGNESIUM Collection Time: 01/16/20  4:55 PM  
Result Value Ref Range Magnesium 2.3 1.8 - 2.4 mg/dL URINALYSIS W/ RFLX MICROSCOPIC Collection Time: 01/16/20  4:55 PM  
Result Value Ref Range Color YELLOW Appearance CLEAR Specific gravity 1.012 1.001 - 1.023    
 pH (UA) 5.5 5.0 - 9.0 Protein TRACE (A) NEG mg/dL Glucose NEGATIVE  mg/dL Ketone NEGATIVE  NEG mg/dL Bilirubin NEGATIVE  NEG Blood MODERATE (A) NEG Urobilinogen 1.0 0.2 - 1.0 EU/dL Nitrites NEGATIVE  NEG  Leukocyte Esterase TRACE (A) NEG    
 WBC 3-5 0 /hpf  
 RBC 10-20 0 /hpf Epithelial cells 0-3 0 /hpf Bacteria 0 0 /hpf Casts 0-3 0 /lpf CULTURE, URINE Collection Time: 01/16/20  4:55 PM  
Result Value Ref Range Special Requests: NO SPECIAL REQUESTS Culture result:     
  NO GROWTH AFTER SHORT PERIOD OF INCUBATION. FURTHER RESULTS TO FOLLOW AFTER OVERNIGHT INCUBATION. CULTURE, BLOOD Collection Time: 01/16/20  4:55 PM  
Result Value Ref Range Special Requests: LEFT Antecubital 
    
 GRAM STAIN BOTH BOTTLES POSITIVE GRAM NEGATIVE RODS    
 GRAM STAIN     
  RESULTS VERIFIED, PHONED TO AND READ BACK BY CLIFF JON RN 3RD FLOOR [id number]. SBRAZEL  
 GRAM STAIN GRAM POSITIVE COCCI    
 GRAM STAIN GRAM POSITIVE RODS ANAEROBIC BOTTLE POSITIVE    
 GRAM STAIN     
  CRITICAL RESULT NOT CALLED DUE TO PREVIOUS NOTIFICATION OF CRITICAL RESULT WITHIN THE LAST 24 HOURS. Culture result: CULTURE IN PROGRESS,FURTHER UPDATES TO FOLLOW CULTURE, BLOOD Collection Time: 01/16/20  4:55 PM  
Result Value Ref Range Special Requests: RIGHT 
FOREARM 
    
 GRAM STAIN GRAM NEGATIVE RODS ANAEROBIC BOTTLE POSITIVE    
 GRAM STAIN     
  RESULTS VERIFIED, PHONED TO AND READ BACK BY CLIFF JON RN 3RD FLOOR [phone] [id number]. SLB  
 GRAM STAIN GRAM POSITIVE COCCI    
 GRAM STAIN GRAM POSITIVE RODS ANAEROBIC BOTTLE POSITIVE    
 GRAM STAIN     
  RESULTS VERIFIED, PHONED TO AND READ BACK BY Ivette Mo RN @ 0009 ON 1/17/2020 BY AMM Culture result: CULTURE IN PROGRESS,FURTHER UPDATES TO FOLLOW Culture result: REFER TO Mechelle Orosco Dr PANEL  [id number] TSH 3RD GENERATION Collection Time: 01/16/20  4:55 PM  
Result Value Ref Range TSH 4.790 uIU/mL T4, FREE Collection Time: 01/16/20  4:55 PM  
Result Value Ref Range T4, Free 1.4 0.78 - 1.46 NG/DL  
BLOOD CULTURE ID PANEL Collection Time: 01/16/20  4:55 PM  
Result Value Ref Range Acc. no. from [employer] [id number]  Streptococcus DETECTED (A) NOTDET    
 INTERPRETATION Gram positive cocci. Identified by realtime PCR as Streptococcus species (not agalactiae, pyogenes, or pneumoniae). Clinical Consideration Consider discontinuation of IV vancomycin and using an anti-streptococcal beta-lactam (ceftriaxone/cefotaxime (peds)) POC LACTIC ACID Collection Time: 01/16/20  5:00 PM  
Result Value Ref Range Lactic Acid (POC) 3.17 (H) 0.5 - 1.9 mmol/L INFLUENZA A & B AG (RAPID TEST) Collection Time: 01/16/20  6:21 PM  
Result Value Ref Range Influenza A Ag NEGATIVE  NEG Influenza B Ag NEGATIVE  NEG Source NASOPHARYNGEAL    
GLUCOSE, POC Collection Time: 01/16/20  8:54 PM  
Result Value Ref Range Glucose (POC) 50 (L) 65 - 100 mg/dL GLUCOSE, POC Collection Time: 01/16/20  9:29 PM  
Result Value Ref Range Glucose (POC) 120 (H) 65 - 100 mg/dL LACTIC ACID Collection Time: 01/16/20 10:35 PM  
Result Value Ref Range Lactic acid 3.0 (HH) 0.4 - 2.0 MMOL/L  
PROTHROMBIN TIME + INR Collection Time: 01/17/20  4:01 AM  
Result Value Ref Range Prothrombin time 65.0 (H) 11.7 - 14.5 sec INR >6.8 (HH)   
CBC WITH AUTOMATED DIFF Collection Time: 01/17/20  4:01 AM  
Result Value Ref Range WBC 21.2 (H) 4.3 - 11.1 K/uL  
 RBC 3.79 (L) 4.23 - 5.6 M/uL  
 HGB 12.2 (L) 13.6 - 17.2 g/dL HCT 36.1 (L) 41.1 - 50.3 % MCV 95.3 79.6 - 97.8 FL  
 MCH 32.2 26.1 - 32.9 PG  
 MCHC 33.8 31.4 - 35.0 g/dL  
 RDW 16.6 (H) 11.9 - 14.6 % PLATELET 526 608 - 666 K/uL MPV 12.3 9.4 - 12.3 FL ABSOLUTE NRBC 0.03 0.0 - 0.2 K/uL NEUTROPHILS 90 (H) 43 - 78 % LYMPHOCYTES 2 (L) 13 - 44 % MONOCYTES 4 4.0 - 12.0 % EOSINOPHILS 1 0.5 - 7.8 % BASOPHILS 0 0.0 - 2.0 % IMMATURE GRANULOCYTES 3 0.0 - 5.0 %  
 ABS. NEUTROPHILS 19.2 (H) 1.7 - 8.2 K/UL  
 ABS. LYMPHOCYTES 0.4 (L) 0.5 - 4.6 K/UL  
 ABS. MONOCYTES 0.8 0.1 - 1.3 K/UL  
 ABS. EOSINOPHILS 0.2 0.0 - 0.8 K/UL  
 ABS. BASOPHILS 0.0 0.0 - 0.2 K/UL ABS. IMM. GRANS. 0.6 (H) 0.0 - 0.5 K/UL  
 RBC COMMENTS SLIGHT 
ANISOCYTOSIS + POIKILOCYTOSIS 
    
 WBC COMMENTS Result Confirmed By Smear PLATELET COMMENTS ADEQUATE    
 DF AUTOMATED METABOLIC PANEL, BASIC Collection Time: 01/17/20  4:01 AM  
Result Value Ref Range Sodium 136 136 - 145 mmol/L Potassium 4.7 3.5 - 5.1 mmol/L Chloride 104 98 - 107 mmol/L  
 CO2 20 (L) 21 - 32 mmol/L Anion gap 12 7 - 16 mmol/L Glucose 111 (H) 65 - 100 mg/dL BUN 97 (H) 8 - 23 MG/DL Creatinine 3.24 (H) 0.8 - 1.5 MG/DL  
 GFR est AA 24 (L) >60 ml/min/1.73m2 GFR est non-AA 20 (L) >60 ml/min/1.73m2 Calcium 8.3 8.3 - 10.4 MG/DL  
LACTIC ACID Collection Time: 01/17/20  4:01 AM  
Result Value Ref Range Lactic acid 3.4 (HH) 0.4 - 2.0 MMOL/L  
GLUCOSE, POC Collection Time: 01/17/20  7:19 AM  
Result Value Ref Range Glucose (POC) 111 (H) 65 - 100 mg/dL LACTIC ACID Collection Time: 01/17/20  9:27 AM  
Result Value Ref Range Lactic acid 3.4 (HH) 0.4 - 2.0 MMOL/L  
GLUCOSE, POC Collection Time: 01/17/20 11:05 AM  
Result Value Ref Range Glucose (POC) 131 (H) 65 - 100 mg/dL Assessment/ Plan:  
 
Principal Problem: 
  Sepsis ([facility] (1/17/2020) Active Problems: S/P CABG (coronary artery bypass graft) (9/23/2016) Overview: 2007 - after being found to have an abnormal EKG Chronic systolic heart failure ([facility] (9/23/2016) Overview: 2013 ([place]) - EF 19%, RVSP 52, severe MR Mar 2017 - EF 47%, mod to severe MR, RVSP 55 Feb 2018 - EF 45-50%, normal AVR, mild MR, RVSP 52 Feb 2019- EF 45-50%, normal AVR, mild MR, RVSP 49 Atrial fibrillation, chronic, permanent ([facility] (9/23/2016) Overview: rate controlled, anticoagulated Hypertensive disorder (11/22/2016) Acquired hypothyroidism (11/22/2016) Controlled type 2 diabetes mellitus with stage 2 chronic kidney disease, with long-term current use of insulin ([facility] (11/22/2016) TESSIE (acute kidney injury) ([facility] (1/16/2020) Bacteremia (1/17/2020) Abdominal pain (1/17/2020) Sepsis from Gram negative jian bacteremia - Continue Zosyn started on 1/17/20. Giving NS bolus of 1.5L. Avoid volume overload due to hx of CHF. TESSIE- Pre renal. Likely from hypotension from sepsis. Once treating infection and maintaining BP I am suspecting creatine will improve.  
  
Abdominal pain - Order KUB. Continue full liquids for now. Check lipase.  
  
Supra therapeutic INR - Likely from poor PO intake effecting metabolism of warfarin. Hold coumadin. Daily INR. Give vitamin K 2.5mg oral today 
  
sCHF- EF 45% on last ECHO. Monitor for signs of volume overload.  
  
HTN- Hold BP medications due to poor PO intake 
  
A fib - Hold INR. Goal INR 2.5-3.5.  
  
DM type II - hold lantus due to poor PO intake. SS alone.  
  
Rash - s/p steroid injection. Improving per the wife.  
  
HLD- statin 
  
Hypothyroidism - Levothyroxine  
  
DNR [email or web address]- strep also growing in blood cultures. Will discuss with pharmacy best antibiotic to be on.  
  
DVT prophylaxis - SCDs Signed By: Johnnette Rubinstein,    
 January 17, 2020

## 2020-01-17 NOTE — PROGRESS NOTES
Dr. Scott Jerome notified of patient's low BP 86/41 HR 61. New order received. Bolus of 500 ml infusing at this time. Will monitor.

## 2020-01-17 NOTE — ED NOTES
TRANSFER - OUT REPORT: 
 
Verbal report given to Chasity Arita RN on Niko Herr  being transferred to [address] [place] [address] for routine progression of care Report consisted of patients Situation, Background, Assessment and  
Recommendations(SBAR). Information from the following report(s) ED Summary was reviewed with the receiving nurse. Lines:  
Peripheral IV 01/16/20 Left Antecubital (Active) Site Assessment Clean, dry, & intact 1/16/2020  4:48 PM  
Phlebitis Assessment 0 1/16/2020  4:48 PM  
Infiltration Assessment 0 1/16/2020  4:48 PM  
Dressing Status Clean, dry, & intact 1/16/2020  4:48 PM  
Dressing Type Transparent 1/16/2020  4:48 PM  
Hub Color/Line Status Pink 1/16/2020  4:48 PM  
Action Taken Blood drawn 1/16/2020  4:48 PM  
   
Peripheral IV 01/16/20 Right Forearm (Active) Site Assessment Clean, dry, & intact 1/16/2020  4:49 PM  
Phlebitis Assessment 0 1/16/2020  4:49 PM  
Infiltration Assessment 0 1/16/2020  4:49 PM  
Dressing Status Clean, dry, & intact 1/16/2020  4:49 PM  
Dressing Type Transparent 1/16/2020  4:49 PM  
Hub Color/Line Status Pink 1/16/2020  4:49 PM  
Action Taken Blood drawn 1/16/2020  4:49 PM  
   
Peripheral IV 01/16/20 Right Hand (Active) Site Assessment Clean, dry, & intact 1/16/2020  4:49 PM  
Phlebitis Assessment 0 1/16/2020  4:49 PM  
Infiltration Assessment 0 1/16/2020  4:49 PM  
Dressing Status Clean, dry, & intact 1/16/2020  4:49 PM  
Dressing Type Transparent 1/16/2020  4:49 PM  
Hub Color/Line Status Pink 1/16/2020  4:49 PM  
  
 
Opportunity for questions and clarification was provided. Patient transported with: 
 Registered Nurse

## 2020-01-17 NOTE — PROGRESS NOTES
Assessment done via doc flow sheet. Pt resting quietly in bed, alert & oriented x3, resp easy & regular, lungs clear, diminished at the bases. Denies pain. Complained of nausea, Zofran 4 mg IVP given as ordered.

## 2020-01-17 NOTE — PROGRESS NOTES
Care Management Interventions Mode of Transport at Discharge: BLS Transition of Care Consult (CM Consult): Discharge Planning, SNF Physical Therapy Consult: Yes Occupational Therapy Consult: Yes Current Support Network: Lives with Spouse Confirm Follow Up Transport: Family The Patient and/or Patient Representative was Provided with a Choice of Provider and Agrees with the Discharge Plan?: Yes Freedom of Choice List was Provided with Basic Dialogue that Supports the Patient's Individualized Plan of Care/Goals, Treatment Preferences and Shares the Quality Data Associated with the Providers?: Yes Discharge Location Discharge Placement: Skilled nursing facility Pt seen in ER by JOSE SHAH for initial visit. Pt seen by PT/OT this am. Both report pt is extremely weak and deconditioned. Pt has been sick for several days prior to coming into the hospital. Both rec pt go for some short term rehab. Couple lives in [place]. CC Link opened for ref placed to [facility] and [facility] will cont to follow and monitor pt progress over weekend. Wife reported pt was difficult but managed prior to adm but now he is requiring a lot more assistance. Pt has therapy on board and PPD already ordered. Eliazar November

## 2020-01-17 NOTE — PROGRESS NOTES
Problem: Falls - Risk of 
Goal: *Absence of Falls Description Document Clide Failing Fall Risk and appropriate interventions in the flowsheet. Outcome: Progressing Towards Goal 
Note: Fall Risk Interventions: 
Mobility Interventions: Bed/chair exit alarm, OT consult for ADLs, Utilize walker, cane, or other assistive device Medication Interventions: Bed/chair exit alarm Elimination Interventions: Call light in reach, Toileting schedule/hourly rounds History of Falls Interventions: Bed/chair exit alarm, Consult care management for discharge planning, Door open when patient unattended, Evaluate medications/consider consulting pharmacy, Investigate reason for fall, Room close to nurse's station Problem: Patient Education: Go to Patient Education Activity Goal: Patient/Family Education Outcome: Progressing Towards Goal 
  
Problem: Pressure Injury - Risk of 
Goal: *Prevention of pressure injury Description Document Jeff Scale and appropriate interventions in the flowsheet. Outcome: Progressing Towards Goal 
Note: Pressure Injury Interventions: 
Sensory Interventions: Assess changes in LOC, Assess need for specialty bed Moisture Interventions: Absorbent underpads, Apply protective barrier, creams and emollients Activity Interventions: PT/OT evaluation Mobility Interventions: HOB 30 degrees or less, Pressure redistribution bed/mattress (bed type), Assess need for specialty bed Nutrition Interventions: Document food/fluid/supplement intake Friction and Shear Interventions: Apply protective barrier, creams and emollients, Lift sheet, Minimize layers Problem: Patient Education: Go to Patient Education Activity Goal: Patient/Family Education Outcome: Progressing Towards Goal 
  
Problem: Fluid Volume - Risk of, Imbalanced Goal: *Balanced intake and output Outcome: Progressing Towards Goal 
  
Problem: Patient Education: Go to Patient Education Activity Goal: Patient/Family Education Outcome: Progressing Towards Goal

## 2020-01-17 NOTE — PROGRESS NOTES
TRANSFER - IN REPORT: 
 
Verbal report received from Emi(name) on Dg Sow  being received from ED(unit) for routine progression of care Report consisted of patients Situation, Background, Assessment and  
Recommendations(SBAR). Information from the following report(s) SBAR, Kardex, [facility] and Recent Results was reviewed with the receiving nurse. Opportunity for questions and clarification was provided. Assessment completed upon patients arrival to unit and care assumed.

## 2020-01-17 NOTE — CONSULTS
[facility] Consult Note Primary GI Physician:  
 
Referring Provider:  Dr Dave Simon Consult Date:  1/17/2020 Admit Date:  1/16/2020 Chief Complaint:  pancreatitis Subjective:  
 
History of Present Illness:  Patient is a 81 y.o. male with PMH of A fib, CAD s/p CABG, aortic valve replacement, skin cancer, DM, GERD, HTN, thyroid disease, CKD, and systolic heart failure, seen in consultation at the request of Dr. Dave Simon for pancreatitis. Pt presented to ED 1/16/20 with nausea, vomiting, and abdominal pain which began earlier in the day and was admitted by the hospitalist service. His spouse reports recent issues with his glucose after use of steroids for gout; she believes frequent urination dehydrated him and led to the nausea and vomiting. Labs in ER with WBC 10.9, up to 21.2 today, hgb 12.5, platelet 812, INR 6.8. Chemistry with T bili 7.8, , , , BUN 97, Cr 3.24, lipase 6743, lactic acid 3.4. He has had epigastric pain since yesterday and wife agrees he has looked yellow in last 24 hours. He has had no abdominal imaging. He continues to have n/v despite Zofran dosing but the diarrhea has slowed. Coumadin held currently due to supra therapeutic INR and he received Vit K today. Blood cultures positive for GNR and Zosyn has been started. He has had hypotension today, improving with fluid bolus. PMH: 
Past Medical History:  
Diagnosis Date  Arthritis  Atrial fibrillation ([address] chronic  CAD (coronary artery disease)  Cancer ([address] skin  DM (diabetes mellitus), type 2 ([address]  Family history of prostate problems  GERD (gastroesophageal reflux disease)  Hearing difficulty  HLD (hyperlipidemia)  Hypertension  Hypothyroidism  Insulin dependent diabetes mellitus ([address]  Kidney problem  Systolic heart failure ([address] PSH: 
Past Surgical History:  
Procedure Laterality Date  CARDIAC SURG PROCEDURE UNLIST  06/2007 Triple By pass  CARDIAC SURG PROCEDURE UNLIST  11/14/2011 Pacemaker  CARDIAC SURG PROCEDURE UNLIST  05/10/2013 Bi-ventricular defibrillator  HX AORTIC VALVE REPLACEMENT  11/10/2011  HX APPENDECTOMY  08/2004  HX CATARACT REMOVAL  04/2011  HX CORONARY ARTERY BYPASS GRAFT    
 HX HERNIA REPAIR  08/2004  HX KNEE REPLACEMENT  04/25/2016  
 right [address]  HX OTHER SURGICAL    
 prostate surgery  HX OTHER SURGICAL    
 bi-ventricular pacemaker  HX PROSTATECTOMY  03/01/2006  HX TONSILLECTOMY Allergies: Allergies Allergen Reactions  Latex Rash  Adhesive Rash  Allopurinol Rash Home Medications: 
Prior to Admission medications Medication Sig Start Date End Date Taking? Authorizing Provider  
colchicine 0.6 mg tablet Take 20 mg by mouth daily. Yes Koffi, MD Rosario  
pravastatin (PRAVACHOL) 20 mg tablet TAKE 1 TABLET BY MOUTH  NIGHTLY 1/15/20  Yes Felix Reid MD  
furosemide (LASIX) 40 mg tablet TAKE 1 TABLET BY MOUTH TWO  TIMES DAILY 1/10/20  Yes Cecilia Potts MD  
predniSONE (DELTASONE) 20 mg tablet 2x5d, 1 x5d 1/9/20  Yes Cecilia Potts MD  
carvedilol (COREG) 6.25 mg tablet TAKE 1 TABLET BY MOUTH TWO  TIMES DAILY WITH MEALS 12/9/19  Yes Cecilia Potts MD  
tamsulosin (FLOMAX) 0.4 mg capsule TAKE 1 CAPSULE BY MOUTH DAILY 12/6/19  Yes Cecilia Potts MD  
potassium chloride (K-DUR, KLOR-CON) 20 mEq tablet Take 1 Tab by mouth daily. 11/11/19  Yes Cecilia Potts MD  
diclofenac (VOLTAREN) 1 % gel Apply 1 g to affected area four (4) times daily.  8/12/19  Yes Cecilia Potts MD  
levothyroxine (SYNTHROID) 125 mcg tablet TAKE 1 TABLET BY MOUTH  DAILY BEFORE BREAKFAST 6/1/19  Yes Stephanie Ceballos MD  
lisinopril (PRINIVIL, ZESTRIL) 2.5 mg tablet TAKE 1 TABLET BY MOUTH  DAILY 6/1/19  Yes Stephanie Ceballos MD  
 COUMADIN 5 mg tablet 1 to 1&1/2 tabs every day or as directed 11/9/18  Yes Roni Reid MD  
omeprazole (PRILOSEC) 20 mg capsule TAKE 1 CAPSULE BY MOUTH  DAILY 10/30/18  Yes Lenny Donnelly MD  
Insulin Needles, Disposable, (BD ULTRA-FINE MINI PEN NEEDLE) 31 gauge x 3/16\" ndle Testing TID as directed, E11.9 8/21/18  Yes Lenny Donnelly MD  
lancets 33 gauge misc Test BS TID/PRN Dx E11.9 8/21/18  Yes Lenny Donnelly MD  
insulin aspart (NOVOLOG) 100 unit/mL injection by SubCUTAneous route as needed. 6 am, lunch 4 ; 6 supper-plus few units by scale if high   Yes Provider, Historical  
aspirin delayed-release 81 mg tablet Take 81 mg by mouth daily. Yes Provider, Historical  
multivitamin (ONE A DAY) tablet Take 1 Tab by mouth daily. Yes Provider, Historical  
insulin glargine (LANTUS) 100 unit/mL injection 16 Units by SubCUTAneous route. As directed    Yes Provider, Historical  
ferrous sulfate (IRON) 325 mg (65 mg iron) tablet Take 65 mg by mouth Daily (before breakfast). Yes Provider, Historical  
cyclobenzaprine (FLEXERIL) 5 mg tablet Take 1 Tab by mouth nightly. 8/12/19   Lenny Donnelly MD  
glucose blood VI test strips (ONETOUCH ULTRA BLUE TEST STRIP) strip USE TO TEST THREE TIMES DAILY AND AS NEEDED, E11.9 6/20/19   Lenny Donnelly MD  
pen needle, diabetic 33 gauge x 3/16\" ndle Test bs TID/PRN Dx E11.9 pt uses easy fine one touch delica 7/67/41   Rudi Lopez NP  
metOLazone (ZAROXOLYN) 2.5 mg tablet Take 1 Tab by mouth as needed. 6/5/17   [facility] Medications: 
Current Facility-Administered Medications Medication Dose Route Frequency  cefTRIAXone (ROCEPHIN) 2 g in 0.9% sodium chloride (MBP/ADV) 50 mL  2 g IntraVENous Q24H  
 lactated Ringers infusion  50 mL/hr IntraVENous CONTINUOUS  
 acetaminophen (TYLENOL) tablet 650 mg  650 mg Oral Q4H PRN  
 HYDROcodone-acetaminophen (NORCO) 5-325 mg per tablet 1 Tab  1 Tab Oral Q4H PRN  
  naloxone (NARCAN) injection 0.4 mg  0.4 mg IntraVENous PRN  
 diphenhydrAMINE (BENADRYL) capsule 25 mg  25 mg Oral Q4H PRN  
 ondansetron (ZOFRAN) injection 4 mg  4 mg IntraVENous Q4H PRN  
 aspirin delayed-release tablet 81 mg  81 mg Oral DAILY  [Held by provider] carvediloL (COREG) tablet 6.25 mg  6.25 mg Oral BID WITH MEALS  diclofenac (VOLTAREN) 1 % topical gel 2 g (Patient Supplied)  2 g Topical QID PRN  
 ferrous sulfate tablet 325 mg  325 mg Oral ACB  levothyroxine (SYNTHROID) tablet 125 mcg  125 mcg Oral 6am  
 pravastatin (PRAVACHOL) tablet 20 mg  20 mg Oral DAILY  pantoprazole (PROTONIX) tablet 40 mg  40 mg Oral DAILY  insulin lispro (HUMALOG) injection   SubCUTAneous AC&HS  tuberculin injection 5 Units  5 Units IntraDERMal ONCE  Warfarin Intermentent Note- warfarin on hold, pharmacy to dose   Other PRN Social History: 
Social History Tobacco Use  Smoking status: Never Smoker  Smokeless tobacco: Never Used Substance Use Topics  Alcohol use: No  
  Comment: occ wine and beer Family History: 
Family History Problem Relation Age of Onset  Other Mother   
     mitral valve disease  Heart Failure Father  No Known Problems Sister Review of Systems: A detailed 10 system ROS is obtained, with pertinent positives as listed above. All others are negative. Diet:  sips Objective:  
 
Physical Exam: 
Vitals: 
Visit Vitals BP 92/55 Pulse 60 Temp 97.6 °F (36.4 °C) Resp 18 Ht 5' 10\" (1.778 m) Wt 97.5 kg (215 lb) SpO2 92% BMI 30.85 kg/m² Gen:  Pt is alert, cooperative, appears ill, vomiting recurrently Skin:  Extremities and face reveal no rashes. Jaundiced HEENT: Sclerae mildly icteric. Extra-occular muscles are intact. Cardiovascular: Regular rate and rhythm. No murmurs, gallops, or rubs. Respiratory:  Comfortable breathing with no accessory muscle use.  Clear breath sounds anteriorly with no wheezes, rales, or rhonchi. GI:  Abdomen soft, full, mildly tender diffusely. Normal active bowel sounds. No enlargement of the liver or spleen. No masses palpable. Rectal:  Deferred Musculoskeletal:  No pitting edema of the lower legs. Neurological:  Gross memory appears intact. Patient is alert and oriented. Psychiatric:  Mood appears appropriate with judgement intact. Lymphatic:  No cervical or supraclavicular adenopathy. Laboratory:   
Recent Labs  
  01/17/20 
0401 01/16/20 
1655 WBC 21.2* 10.9 HGB 12.2* 12.5*  
HCT 36.1* 37.0*  
 201 MCV 95.3 94.9  137  
K 4.7 4.9  104 CO2 20* 22 BUN 97* 88* CREA 3.24* 2.52* CA 8.3 8.6 MG  --  2.3 * 69  
AP  --  539* SGOT  --  200* ALT  --  120* TBILI  --  7.8* ALB  --  3.0*  
TP  --  7.2 LPSE 6,743*  --   
PTP 65.0* 47.1* INR >6.8* 4.9* Assessment:  
 
Principal Problem: 
  Sepsis ([facility] (1/17/2020) Active Problems: S/P CABG (coronary artery bypass graft) (9/23/2016) Overview: 2007 - after being found to have an abnormal EKG Chronic systolic heart failure ([address] (9/23/2016) Overview: 2013 ([place]) - EF 19%, RVSP 52, severe MR Mar 2017 - EF 47%, mod to severe MR, RVSP 55 Feb 2018 - EF 45-50%, normal AVR, mild MR, RVSP 52 Feb 2019- EF 45-50%, normal AVR, mild MR, RVSP 49 Atrial fibrillation, chronic, permanent ([facility] (9/23/2016) Overview: rate controlled, anticoagulated Hypertensive disorder (11/22/2016) Acquired hypothyroidism (11/22/2016) Controlled type 2 diabetes mellitus with stage 2 chronic kidney disease, with long-term current use of insulin ([address] (11/22/2016) TESSIE (acute kidney injury) ([address] (1/16/2020) Bacteremia (1/17/2020) Abdominal pain (1/17/2020) Plan:  
 
82 yo male is seen in consultation for acute onset of n/v and abdominal pain which began yesterday. His labs revealed elevation in liver chemistry with lipase >6000. He reports recent use of Allopurinol which seemed to cause a rash. He was subsequently given steroids outpatient for the rash/gout. He denies other new medications. He has had no prior hx of ulcer disease or gallbladder issues. Blood cultures + for gram neg rods and he has been placed on Rocephin. Etiology of symptoms may be secondary to gastric/duodenal inflammation, possible biliary disease, medication related, or other. 1.  Abdominal ultrasound 2. Follow WBC; on Rocephin 3. Follow liver chemistry 4. Supportive care with hydration, anti-emetics 5. Further recommendations to be based on findings Patient is seen and examined in collaboration with Dr. Debbie Rivero. Assessment and plan as per Dr. Debbie Rivero. Selma Mendez NP Patient seen and examined, plan reviewed. Agree with the exception of any noted changes/additions. In short, this is a 80 yo M with extensive PMH/ PSH as listed above including Afib, CAD s/p CABG, AVR on Coumadin, Systolic CHF, Skin Cancer, GERD, HTN, CKD and Thyroid disease with recent bout of Gout for which he has tried colchicine, steroids and allopurinol. He developed a severe skin rash with his allopurinol. He came to the ER with nausea, vomiting and epigastric to RUQ pain which started acutely. Labs on arrival concerning for WBC > 21, INR of 6.8, , ,  and TB of 7.8.  (Note that he had normal LFTs in March 2019). BUN 97 and Cr 3.24. Lipase was elevated to 6743. Lactic Acid 3.4. BCx positive for GNR for which he has been started on Zosyn. US RUQ ordered by the GI team today is concerning for acute cholecystitis. CBD is normal at 7 mm. There were concerns for hepatitis. At this time, I would recommend supportive care for his pancreatitis.   NPO, IVFs, pain and nausea management per primary team.  Unclear etiology for the pancreatitis   He does not drink alcohol. There are no stones in the gallbladder and definitely no choledocholithiasis and CBD is normal on US. Calcium level is normal.  Triglycerides were normal in March 2019. Can recheck them again but it would likely be low yield. His pancreatitis could be ischemic in nature from his sepsis. Other potential etiology is from medications (ex. Colchicine and less likely Allopurinol). For the cholecystitis noted on US, this can certainly explain the elevated LFTs. This can also explain the GNR bacteremia for which patient is on Zosyn. Will consult Surgery for further evaluation. Continue to monitor LFTs. Will follow.  
 
Ludwin Deluca MD 
[facility], [address]

## 2020-01-17 NOTE — PROGRESS NOTES
Interdisciplinary team rounds were held 1/17/2020 with the following team members:Care Management, Nursing, Nutrition, Pharmacy, Physical Therapy and Physician. Plan of care discussed. See clinical pathway and/or care plan for interventions and desired outcomes.

## 2020-01-17 NOTE — PROGRESS NOTES
Warfarin dosing per pharmacist 
 
Qi Lindsay is a 81 y.o. male. Indication:  AFIB Goal INR:  2.5 - 3  (elderly patient with mechanical valve) Home dose:  5 mg or 7.5mg as directed Risk factors or significant drug interactions:  pravastatin, synthroid Other anticoagulants:  none Daily Monitoring Date  INR     Warfarin dose HGB              Notes 1/16  4.9  hold  12.5 Pharmacy will monitor a daily INR and will resume warfarin when the INR reaches 2.5 of less. Thank you, Clyde Noel, PharmD

## 2020-01-17 NOTE — PROGRESS NOTES
01/16/20 2000 Dual Skin Pressure Injury Assessment Dual Skin Pressure Injury Assessment X Second Care Provider (Based on [address] [place] [address]) Katia Avendaño RN Buttocks/Ishium  Bilateral 
(pink; blanchable ) Skin Integumentary Skin Integumentary (WDL) X Skin Color Ecchymosis (comment) (scattered) Skin Condition/Temp Warm;Rash;Fragile 
(scattered bruising; rash to BLE; fragile w/ tears scattered) Skin Integrity Tear 
(BUE; on head behind L ear) Turgor Epidermis thin w/ loss of subcut tissue

## 2020-01-17 NOTE — CONSULTS
[facility] CONSULT NOTE Admission Date: 
1/16/2020 Admission Diagnosis: 
TESSIE (acute kidney injury) ([facility] [address] [address] [N17.9] Viral gastroenteritis [A08.4] Consulting physician: Dr. TALBOT Reason for consult: TESSIE Subjective:  
History of Present Illness: 
Carmen Bustamante is a 81 y.o. male with a PMH including A fib, CAD s/p CABG, aortic valve replacement, skin cancer, DM, GERD, HTN, thyroid disease, systolic heart failure, and CKD III who presented to the hospital on 1/16/20 with complaints of n/v abdominal pain and was found to have pancreatitis. Per report he had a recent rash as a reaction to allopurinol. On 1/17/20 blood cultures growing gram negative rods and patient hypotensive. He has a lactic acidosis as well. Patient is seen alert and oriented in his room. He is accompanied by his wife who provides the majority of his recent history. 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 He was given steroids, but unclear for how long - appears to have been a 1 time injection. No complaints from patient currently, other than the rash, and nausea/vomiting. Past Medical History:  
Diagnosis Date  Arthritis  Atrial fibrillation ([address] chronic  CAD (coronary artery disease)  Cancer ([address] skin  DM (diabetes mellitus), type 2 ([address]  Family history of prostate problems  GERD (gastroesophageal reflux disease)  Hearing difficulty  HLD (hyperlipidemia)  Hypertension  Hypothyroidism  Insulin dependent diabetes mellitus ([address]  Kidney problem  Systolic heart failure ([address] Past Surgical History:  
Procedure Laterality Date  CARDIAC SURG PROCEDURE UNLIST  06/2007 Triple By pass  CARDIAC SURG PROCEDURE UNLIST  11/14/2011 Pacemaker  CARDIAC SURG PROCEDURE UNLIST  05/10/2013 Bi-ventricular defibrillator  HX AORTIC VALVE REPLACEMENT  11/10/2011  HX APPENDECTOMY  08/2004  HX CATARACT REMOVAL  04/2011  HX CORONARY ARTERY BYPASS GRAFT    
 HX HERNIA REPAIR  08/2004  HX KNEE REPLACEMENT  04/25/2016  
 right [facility]  HX OTHER SURGICAL    
 prostate surgery  HX OTHER SURGICAL    
 bi-ventricular pacemaker  HX PROSTATECTOMY  03/01/2006  HX TONSILLECTOMY Current Facility-Administered Medications Medication Dose Route Frequency  cefTRIAXone (ROCEPHIN) 2 g in 0.9% sodium chloride (MBP/ADV) 50 mL  2 g IntraVENous Q24H  
 lactated Ringers infusion  50 mL/hr IntraVENous CONTINUOUS  
 acetaminophen (TYLENOL) tablet 650 mg  650 mg Oral Q4H PRN  
 HYDROcodone-acetaminophen (NORCO) 5-325 mg per tablet 1 Tab  1 Tab Oral Q4H PRN  
 naloxone (NARCAN) injection 0.4 mg  0.4 mg IntraVENous PRN  
 diphenhydrAMINE (BENADRYL) capsule 25 mg  25 mg Oral Q4H PRN  
 ondansetron (ZOFRAN) injection 4 mg  4 mg IntraVENous Q4H PRN  
 aspirin delayed-release tablet 81 mg  81 mg Oral DAILY  [Held by provider] carvediloL (COREG) tablet 6.25 mg  6.25 mg Oral BID WITH MEALS  diclofenac (VOLTAREN) 1 % topical gel 2 g (Patient Supplied)  2 g Topical QID PRN  
 ferrous sulfate tablet 325 mg  325 mg Oral ACB  levothyroxine (SYNTHROID) tablet 125 mcg  125 mcg Oral 6am  
 pravastatin (PRAVACHOL) tablet 20 mg  20 mg Oral DAILY  pantoprazole (PROTONIX) tablet 40 mg  40 mg Oral DAILY  insulin lispro (HUMALOG) injection   SubCUTAneous AC&HS  tuberculin injection 5 Units  5 Units IntraDERMal ONCE  Warfarin Intermentent Note- warfarin on hold, pharmacy to dose   Other PRN Allergies Allergen Reactions  Latex Rash  Adhesive Rash  Allopurinol Rash Social History Tobacco Use  Smoking status: Never Smoker  Smokeless tobacco: Never Used Substance Use Topics  Alcohol use: No  
  Comment: occ wine and beer Family History Problem Relation Age of Onset  Other Mother   
     mitral valve disease  Heart Failure Father  No Known Problems Sister Review of Systems Gen - fever, chills, appetite okay HEENT - no sore throat, no decreased vision, no hearing loss Neck - no neck mass CV - no chest pain, no palpitation, no orthopnea Lung - no shortness of breath, no cough, no hemoptysis Abd - ]tenderness, nausea/vomiting, no bloody stool Ext - no edema, no clubbing, no cyanosis Musculoskeletal - no joint pain, no back pain Neurologic - no headaches, no dizziness, no seizures Psychiatric - no anxiety, no depression Skin - purpuric rash Genitourinary - no decreased urine output, no hematuria, no foamy urine Objective:  
Vitals:  
 01/17/20 0948 01/17/20 1123 01/17/20 1256 01/17/20 1608 BP: (!) 88/50 (!) 89/49 92/55 110/61 Pulse: 60 82 60 60 Resp:  18  16 Temp:  97.6 °F (36.4 °C)  (!) 94.5 °F (34.7 °C) SpO2:  92%  97% Weight:      
Height:      
 
No intake or output data in the 24 hours ending 01/17/20 1824 Physical Exam 
GEN :in no distress, alert and oriented HEENT: anicteric sclerae, trachea midline Neck - supple, no thyromegaly CV - regular rate and rhythm, no rub Lung - clear bilaterally, lungs expand symmetrically Chest wall - normal appearance Abd - soft, nontender, bowel sounds present Ext - no clubbing, no cyanosis, no edema Neurologic - nonfocal 
Genitourinary - bladder nonpalpable Skin - purpuric rash diffusely, no cyanosis Psychiatric: Normal mood and affect. Cooperative. Data Review:  
Recent Labs  
  01/17/20 
0401 01/16/20 
1655 WBC 21.2* 10.9 HGB 12.2* 12.5*  
HCT 36.1* 37.0*  
 201 Recent Labs  
  01/17/20 
0401 01/16/20 
1655  137  
K 4.7 4.9  104 CO2 20* 22 BUN 97* 88* CREA 3.24* 2.52* * 69  
CA 8.3 8.6 MG  --  2.3 No results for input(s): PH, PCO2, PO2, PCO2 in the last 72 hours. Assessment and Plan: TESSIE on CKD - baseline Cr ~2mg/dL 
- more than likely patient is experiencing ATN from sepsis, but the vasculitic rash may be playing a small role as well. Monitor urine output closely over the next 12 hours. Repeat BMP in in AM. If BUN and Cr continue to rise rapidly, recommend transfer to [address] [place] [address] on Saturday. If patient has < 200cc of urine produced over the next 12 hours, recommend transfer to [facility] as well. No indication for dialysis currently, but I discussed with the family that it is a possibility in the future. Please contact Dr. Shannon Gay in the morning for further recommendations. - strict I&Os - place lou 
- repeat UA / UPC 
- daily BMP, Mag, Phos 
- dose Abx with decreased renal function in mind 
- keep MAP > 65 Metabolic acidosis / lactic acidosis - will follow Sepsis / bacteremia - per primary Pancreatitis - per GI Huyen Sanchez MD 
[facility]

## 2020-01-17 NOTE — PROGRESS NOTES
Problem: Mobility Impaired (Adult and Pediatric) Goal: *Acute Goals and Plan of Care (Insert Text) Description STG: 
(1.)Mr. Chung Rojas will move from supine to sit and sit to supine  with MODERATE ASSIST of 2 within 4-7 treatment day(s). (2.)Mr. Chung Rojas will transfer from bed to chair and chair to bed with MODERATE ASSIST of 2 using the least restrictive device within 4-7 treatment day(s). (3.)Mr. Palacios will ambulate with MODERATE ASSIST for 10 feet with the least restrictive device within 4-7 treatment day(s). ________________________________________________________________________________________________ Outcome: Progressing Towards Goal 
  
PHYSICAL THERAPY: Initial Assessment and AM 1/17/2020 INPATIENT: PT Visit Days : 1 [place] / Product Type: Medicare /   
  
NAME/AGE/GENDER: Hans Childs is a 81 y.o. male PRIMARY DIAGNOSIS: TESSIE (acute kidney injury) ([facility] [N17.9] Viral gastroenteritis [A08.4] Sepsis ([facility] [facility] ICD-10: Treatment Diagnosis:  
 Generalized Muscle Weakness (M62.81) Difficulty in walking, Not elsewhere classified (R26.2) Precaution/Allergies: 
Latex; Adhesive; and Allopurinol ASSESSMENT:  
 
Mr. Chung Rojas presents supine on contact and agreeable to move with therapy despite feeling bad. Talked with RN prior to entry and nurse said that he BP was low, he was septic and had been vomiting but that we could try. Pt needed max assist of 2 to move from supine to sit, needed help scooting to the edge of the bed as well. Sitting balance poor and could not sit without support. Pt very weak and was unable to stand today. Pt hard of hearing, coughing and spitting up phlegm. Legs discolored when he sat up and face turned really red when laying back down. Poor tolerance for activity today. He will benefit from skilled PT interventions to address generalized weakness and decreased independence with functional mobility. He is well below his baseline for mobility and will need therapy while in hospital and on discharge. Talked to  to discuss therapy options post hospital stay. This section established at most recent assessment PROBLEM LIST (Impairments causing functional limitations): 
Decreased Strength Decreased ADL/Functional Activities Decreased Transfer Abilities Decreased Ambulation Ability/Technique Decreased Balance Decreased Activity Tolerance INTERVENTIONS PLANNED: (Benefits and precautions of physical therapy have been discussed with the patient.) Balance Exercise Bed Mobility Gait Training Therapeutic Activites Therapeutic Exercise/Strengthening Transfer Training TREATMENT PLAN: Frequency/Duration: daily for duration of hospital stay Rehabilitation Potential For Stated Goals: Good REHAB RECOMMENDATIONS (at time of discharge pending progress):   
Placement: It is my opinion, based on this patient's performance to date, that Mr. Zoila Paz may benefit from intensive therapy at a [address] [place] [address] after discharge due to the functional deficits listed above that are likely to improve with skilled rehabilitation and concerns that he/she may be unsafe to be unsupervised at home due to being well below his baseline for mobility. Equipment: To be determined HISTORY:  
History of Present Injury/Illness (Reason for Referral): PER MD NOTE: Patient is a 81 y.o. male who presented to the ED for cc intractable emesis, abdominal discomfort, and weakness. Hx of recent allergic reaction causing rash to allopurinol, HLD, HTN, hypothyroidism, DM type II, CKD stage III, a fib on warfarin, CAD s/p CABG, aortic valve replacement, and sCHF.    
Past Medical History/Comorbidities:  
Mr. Zoila Paz  has a past medical history of Arthritis, Atrial fibrillation ([facility] [facility] CAD (coronary artery disease), Cancer ([address] DM (diabetes mellitus), type 2 ([facility] Family history of prostate problems, GERD (gastroesophageal reflux disease), Hearing difficulty, HLD (hyperlipidemia), Hypertension, Hypothyroidism, Insulin dependent diabetes mellitus ([facility] Kidney problem, and Systolic heart failure ([facility] Mr. Jossy Morris  has a past surgical history that includes hx coronary artery bypass graft; hx other surgical; hx other surgical; hx cataract removal (04/2011); hx tonsillectomy; hx hernia repair (08/2004); hx appendectomy (08/2004); hx prostatectomy (03/01/2006); pr cardiac surg procedure unlist (06/2007); hx aortic valve replacement (11/10/2011); pr cardiac surg procedure unlist (11/14/2011); pr cardiac surg procedure unlist (05/10/2013); hx orthopaedic (1973); and hx knee replacement (04/25/2016). Social History/Living Environment:  
Home Environment: Private residence One/Two Story Residence: One story Living Alone: No 
Support Systems: Spouse/Significant Other/Partner Patient Expects to be Discharged to[de-identified] Rehabilitation facility Current DME Used/Available at Home: Lift chair, Raised toilet seat, Walker, rollator, Walker, rolling Tub or Shower Type: Shower Prior Level of Function/Work/Activity: 
Pt lives at home with spouse where he walks with a walker. He uses a lift chair. Wife states he has arthritis and moves slowly with difficulty but managed without a lot of assist from her. Number of Personal Factors/Comorbidities that affect the Plan of Care: 1-2: MODERATE COMPLEXITY EXAMINATION:  
Most Recent Physical Functioning:  
Gross Assessment: 
AROM: Generally decreased, functional 
Strength: Generally decreased, functional 
         
  
Posture: 
  
Balance: 
Sitting: Impaired Sitting - Static: Poor (constant support) Sitting - Dynamic: Poor (constant support) Bed Mobility: 
Supine to Sit: Maximum assistance; Additional time;Assist x2 Sit to Supine: Maximum assistance;Assist x2 Scooting: Moderate assistance;Maximum assistance;Assist x2 Wheelchair Mobility: 
  
Transfers: 
  
Gait: 
  
   
  
 Body Structures Involved: Muscles Body Functions Affected: Movement Related Activities and Participation Affected: Mobility Self Care Number of elements that affect the Plan of Care: 4+: HIGH COMPLEXITY CLINICAL PRESENTATION:  
Presentation: Stable and uncomplicated: LOW COMPLEXITY CLINICAL DECISION MAKIN \[facility] [place] [facility]\"" [address] [address] AM-PAC 6 Clicks Basic Mobility Inpatient Short Form How much difficulty does the patient currently have. .. Unable A Lot A Little None 1. Turning over in bed (including adjusting bedclothes, sheets and blankets)? [] 1   [x] 2   [] 3   [] 4  
2. Sitting down on and standing up from a chair with arms ( e.g., wheelchair, bedside commode, etc.)   [] 1   [x] 2   [] 3   [] 4  
3. Moving from lying on back to sitting on the side of the bed? [] 1   [x] 2   [] 3   [] 4 How much help from another person does the patient currently need. .. Total A Lot A Little None 4. Moving to and from a bed to a chair (including a wheelchair)? [] 1   [x] 2   [] 3   [] 4  
5. Need to walk in hospital room? [x] 1   [] 2   [] 3   [] 4  
6. Climbing 3-5 steps with a railing? [x] 1   [] 2   [] 3   [] 4  
© , Trustees of [address] [place] [address] [address] [address], under license to [employer]. All rights reserved Score:  Initial: 10 Most Recent: X (Date: -- ) Interpretation of Tool:  Represents activities that are increasingly more difficult (i.e. Bed mobility, Transfers, Gait). Medical Necessity:    
Patient is expected to demonstrate progress in  
strength and functional technique 
 to  
decrease assistance required with functional mobility Gallardo Junk Reason for Services/Other Comments: 
Patient continues to require skilled intervention due to Inability to complete functional mobility and strengthening independently Gallardo Junk Use of outcome tool(s) and clinical judgement create a POC that gives a: Clear prediction of patient's progress: LOW COMPLEXITY  
  
 
 
 
TREATMENT:  
 (In addition to Assessment/Re-Assessment sessions the following treatments were rendered) Pre-treatment Symptoms/Complaints:  pt not feeling well, some nausea Pain: Initial: no reports of pain Post Session:  not reporting any pain Assessment/Reassessment only, no treatment provided today Braces/Orthotics/Lines/Etc:  
IV 
O2 Device: Room air Treatment/Session Assessment:   
Response to Treatment:  pt not feeling well, RN aware Interdisciplinary Collaboration: Occupational Therapist 
Registered Nurse After treatment position/precautions:  
Supine in bed Bed/Chair-wheels locked Bed in low position Call light within reach Family at bedside Compliance with Program/Exercises: Will assess as treatment progresses Recommendations/Intent for next treatment session: \"Next visit will focus on advancements to more challenging activities and reduction in assistance provided\". Total Treatment Duration: PT Patient Time In/Time Out Time In: 1130 Time Out: 1145 Nanette Mitchell, PT

## 2020-01-17 NOTE — PROGRESS NOTES
Notified by nursing staff of blood cultures positive for GNR in anaerobic bottles. Will start IV Zosyn.

## 2020-01-17 NOTE — PROGRESS NOTES
Warfarin dosing per pharmacist 
 
Candie Mathews is a 81 y.o. male. Indication:  AFIB Goal INR:  2.5 - 3  (elderly patient with mechanical valve) Home dose:  5 mg or 7.5mg as directed Risk factors or significant drug interactions:  pravastatin, synthroid Other anticoagulants:  none Daily Monitoring Date  INR     Warfarin dose HGB              Notes 1/17                 6.8                  hold 1/16  4.9  hold  12.5 Pharmacy will monitor a daily INR and will resume warfarin when the INR reaches 2.5 of less. Thank you, Brian Cisneros, YasminD.  BCPS

## 2020-01-17 NOTE — PROGRESS NOTES
Spiritual Care Visit, initial visit. Attempted to visit; patient did not respond. Visit by Sirena Aguilar, Staff .  DELMA.Spencer., Brad.B., B.A.

## 2020-01-17 NOTE — PROGRESS NOTES
Problem: Self Care Deficits Care Plan (Adult) Goal: *Acute Goals and Plan of Care (Insert Text) Description 1. Patient will perform self feeding in supported seated with minimal assistance. 2. Patient will perform grooming in supported seated with minimal assistance. 3. Patient will perform Upper body dressing with moderate assistance in supportive sitting. 4. Patient will perform upper body bathing in supported seated with minimal assistance. 5. Patient will perform sitting unsupported edge of bed 10 + minutes to move toward increase independence with self care. 6. Patient will participate in 20 + minutes of ADL/ therapeutic exercise/therapeutic activity with min rest breaks to increase activity tolerance for self care. Goals to be achieved in 7 days. Outcome: Progressing Towards Goal 
  
OCCUPATIONAL THERAPY: Initial Assessment and AM 1/17/2020 INPATIENT:   
[place] / Product Type: Medicare /  
  
NAME/AGE/GENDER: Yogi George is a 81 y.o. male PRIMARY DIAGNOSIS:  TESSIE (acute kidney injury) ([facility] [address] [N17.9] Viral gastroenteritis [A08.4] [facility] Sepsis ([facility] [facility] ICD-10: Treatment Diagnosis:  
 Generalized Muscle Weakness (M62.81) Other lack of cordination (R27.8) Precautions/Allergies: 
   Latex; Adhesive; and Allopurinol ASSESSMENT:  
 
Mr. Zoila Paz presents with above diagnosis and is supine in bed upon arrival. Pt with complaint of stomach pain and nausea (RN aware) but willing to attempt sitting edge of bed. Pt required a great deal of assistance with bed mobility and was unable to sit unsupported. Pt reports pt was slow but independent with a Rollator prior to admission. Pt is sick with much decreased in independence. Pt would benefit from OT to maximize independence with simple self care and mobility. This section established at most recent assessment PROBLEM LIST (Impairments causing functional limitations): 
 Decreased Strength Decreased ADL/Functional Activities Decreased Balance Increased Pain Decreased Activity Tolerance Increased Fatigue INTERVENTIONS PLANNED: (Benefits and precautions of occupational therapy have been discussed with the patient.) Activities of daily living training Adaptive equipment training Balance training Therapeutic activity Therapeutic exercise TREATMENT PLAN: Frequency/Duration: Follow patient 3 times per week to address above goals. Rehabilitation Potential For Stated Goals: Good REHAB RECOMMENDATIONS (at time of discharge pending progress):   
Placement: It is my opinion, based on this patient's performance to date, that Mr. Kenneth Agustin may benefit from intensive therapy at a [address] [place] [address] after discharge due to the functional deficits listed above that are likely to improve with skilled rehabilitation and concerns that he/she may be unsafe to be unsupervised. Equipment:  
None at this time OCCUPATIONAL PROFILE AND HISTORY:  
History of Present Injury/Illness (Reason for Referral): 
See H&P Past Medical History/Comorbidities:  
Mr. Kenneth Agustin  has a past medical history of Arthritis, Atrial fibrillation ([address] CAD (coronary artery disease), Cancer ([address] DM (diabetes mellitus), type 2 ([address] Family history of prostate problems, GERD (gastroesophageal reflux disease), Hearing difficulty, HLD (hyperlipidemia), Hypertension, Hypothyroidism, Insulin dependent diabetes mellitus ([address] Kidney problem, and Systolic heart failure ([address]   Mr. Kenneth Agustin  has a past surgical history that includes hx coronary artery bypass graft; hx other surgical; hx other surgical; hx cataract removal (04/2011); hx tonsillectomy; hx hernia repair (08/2004); hx appendectomy (08/2004); hx prostatectomy (03/01/2006); pr cardiac surg procedure unlist (06/2007); hx aortic valve replacement (11/10/2011); pr cardiac surg procedure unlist (11/14/2011); pr cardiac surg procedure unlist (05/10/2013); hx orthopaedic (1973); and hx knee replacement (04/25/2016). Social History/Living Environment:  
Home Environment: Private residence One/Two Story Residence: One story Living Alone: No 
Support Systems: Spouse/Significant Other/Partner Patient Expects to be Discharged to[de-identified] Rehabilitation facility Current DME Used/Available at Home: Lift chair, Raised toilet seat, Walker, rollator, Walker, rolling Tub or Shower Type: Shower Prior Level of Function/Work/Activity: 
Independent with Rollator Number of Personal Factors/Comorbidities that affect the Plan of Care: Brief history (0):  LOW COMPLEXITY ASSESSMENT OF OCCUPATIONAL PERFORMANCE[de-identified]  
Activities of Daily Living:  
Basic ADLs (From Assessment) Complex ADLs (From Assessment) Feeding: Maximum assistance Oral Facial Hygiene/Grooming: Maximum assistance Bathing: Total assistance Upper Body Dressing: Maximum assistance Lower Body Dressing: Total assistance Toileting: Total assistance(brief) Grooming/Bathing/Dressing Activities of Daily Living Cognitive Retraining Safety/Judgement: Awareness of environment Bed/Mat Mobility Supine to Sit: Maximum assistance;Assist x2; Additional time Sit to Supine: Maximum assistance;Assist x2; Additional time Scooting: Maximum assistance Most Recent Physical Functioning:  
Gross Assessment: 
  
         
  
Posture: 
  
Balance: 
Sitting: Impaired Sitting - Static: Poor (constant support) Sitting - Dynamic: Poor (constant support) Bed Mobility: 
Supine to Sit: Maximum assistance;Assist x2; Additional time Sit to Supine: Maximum assistance;Assist x2; Additional time Scooting: Maximum assistance Wheelchair Mobility: 
  
Transfers: 
   
 
    
 
Patient Vitals for the past 6 hrs: 
 BP BP Patient Position SpO2 Pulse 01/17/20 0948 (!) 88/50   60  
01/17/20 1123 (!) 89/49 At rest 92 % 82  
01/17/20 1256 92/55   60 Mental Status Neurologic State: Alert Orientation Level: Oriented to person, Oriented to place Cognition: Follows commands Perception: Appears intact Perseveration: No perseveration noted Safety/Judgement: Awareness of environment Physical Skills Involved: 
Balance Strength Activity Tolerance Pain (acute) Cognitive Skills Affected (resulting in the inability to perform in a timely and safe manner): 
none  Psychosocial Skills Affected: 
Environmental Adaptation Number of elements that affect the Plan of Care: 3-5:  MODERATE COMPLEXITY CLINICAL DECISION MAKING:  
[facility] AM-PAC 6 Clicks Daily Activity Inpatient Short Form How much help from another person does the patient currently need. .. Total A Lot A Little None 1. Putting on and taking off regular lower body clothing? [x] 1   [] 2   [] 3   [] 4  
2. Bathing (including washing, rinsing, drying)? [x] 1   [] 2   [] 3   [] 4  
3. Toileting, which includes using toilet, bedpan or urinal?   [x] 1   [] 2   [] 3   [] 4  
4. Putting on and taking off regular upper body clothing? [] 1   [x] 2   [] 3   [] 4  
5. Taking care of personal grooming such as brushing teeth? [] 1   [x] 2   [] 3   [] 4  
6. Eating meals? [] 1   [x] 2   [] 3   [] 4  
© 2007, Trustees of [facility], under license to [employer]. All rights reserved Score:  Initial: 9 Most Recent: X (Date: -- ) Interpretation of Tool:  Represents activities that are increasingly more difficult (i.e. Bed mobility, Transfers, Gait). Use of outcome tool(s) and clinical judgement create a POC that gives a: MODERATE COMPLEXITY  
 
 
 
TREATMENT:  
(In addition to Assessment/Re-Assessment sessions the following treatments were rendered) Pre-treatment Symptoms/Complaints:  pt with complaint of abdominal pain and nausea, RN aware. Pain: Initial:  
   Post Session:    
 
Assessment/Reassessment only, no treatment provided today Braces/Orthotics/Lines/Etc:  
IV 
 O2 Device: Room air Treatment/Session Assessment:   
Response to Treatment:  pt sat edge of bed with poor endurance. Interdisciplinary Collaboration:  
Physical Therapist 
Occupational Therapist 
Registered Nurse  After treatment position/precautions:  
Supine in bed Bed/Chair-wheels locked Bed in low position Call light within reach RN notified Family at bedside Compliance with Program/Exercises: Compliant all of the time, Will assess as treatment progresses. Recommendations/Intent for next treatment session: \"Next visit will focus on advancements to more challenging activities and reduction in assistance provided\". Total Treatment Duration: OT Patient Time In/Time Out Time In: 6079 Time Out: 1200 Kishan Tadeo OT

## 2020-01-18 PROBLEM — K85.90 PANCREATITIS: Status: ACTIVE | Noted: 2020-01-01

## 2020-01-18 NOTE — DISCHARGE SUMMARY
Hospitalist Discharge Summary Patient ID: Raye Pallas [id number] 
34 y.o. 
1935 Admit date: 1/16/2020  3:20 PM 
Discharge date and time: 1/18/2020 Attending: Doris Jimenez DO 
PCP:  Melanie Gilliam MD 
Treatment Team: Attending Provider: Doris Jimenez DO; : Yana Katz; Utilization Review: Maldonado Jarquin; Consulting Provider: Lillian Jennings MD; Care Manager: Corey Murphy [facility]; Consulting Provider: Iván Bartholomew MD; Physical Therapist: Jamil Santacruz PT 
 
Principal Diagnosis Sepsis [facility]) Principal Problem: 
  Sepsis ([facility] [facility] (1/17/2020) Active Problems: S/P CABG (coronary artery bypass graft) (9/23/2016) Overview: 2007 - after being found to have an abnormal EKG Chronic systolic heart failure ([address] (9/23/2016) Overview: 2013 ([place]) - EF 19%, RVSP 52, severe MR Mar 2017 - EF 47%, mod to severe MR, RVSP 55 Feb 2018 - EF 45-50%, normal AVR, mild MR, RVSP 52 Feb 2019- EF 45-50%, normal AVR, mild MR, RVSP 49 Atrial fibrillation, chronic, permanent ([address] (9/23/2016) Overview: rate controlled, anticoagulated Hypertensive disorder (11/22/2016) Acquired hypothyroidism (11/22/2016) Controlled type 2 diabetes mellitus with stage 2 chronic kidney disease, with long-term current use of insulin ([address] (11/22/2016) TESSIE (acute kidney injury) ([facility] (1/16/2020) Bacteremia (1/17/2020) Abdominal pain (1/17/2020) Pancreatitis (1/18/2020) Hospital Course: 81 y. o. male who presented to the ED for cc intractable emesis, abdominal discomfort, and weakness. Hx of recent allergic reaction causing rash to allopurinol, HLD, HTN, hypothyroidism, DM type II, CKD stage III, a fib on warfarin, CAD s/p CABG, aortic valve replacement, and sCHF. INR 4.9, creatine 2.53 from baseline 2 on admission.   
 
It was originally thought patient had viral gastroenteritis so supportive care started. Influenza negative. On 1/17/20 blood cultures growing gram negative rods and patient hypotensive. BP improving with bolus. Patient with persistent abdominal pain. Lipase >6,000. GI consulted who recommended supportive care. Patient placed on Vancomycin and Zosyn. During early morning on 1/18, rapid response called due to emesis and shortness of breathe. Initial oxygen saturation 70s. BP dropped to 80/50s. Patient transferred to ICU and vasopressors started. Patient remained hypotensive and altered. Wife at bedside stated to order comfort care measures. Patient passed away shortly after stopping vasopressor support. Suspecting aspiration causing respiratory arrest.  
 
Time of death - 6:12am 
Cause of death - respiratory arrest causing cardiac arrest  
 
Please refer to the admission H&P for details of presentation. Significant Diagnostic Studies:  
 
 
Labs: Results:  
   
Chemistry Recent Labs  
  01/18/20 0351 01/17/20 0401 01/16/20 
1655 * 111* 69  
 136 137  
K 5.9* 4.7 4.9  104 104 CO2 17* 20* 22 * 97* 88* CREA 4.01* 3.24* 2.52* CA 7.5* 8.3 8.6 AGAP 13 12 11 *  --  539* TP 5.8*  --  7.2 ALB 2.0*  --  3.0*  
GLOB 3.8*  --  4.2* AGRAT 0.5*  --  0.7* CBC w/Diff Recent Labs  
  01/18/20 0351 01/17/20 0401 01/16/20 
1655 WBC 19.9* 21.2* 10.9 RBC 4.04* 3.79* 3.90* HGB 12.8* 12.2* 12.5*  
HCT 39.8* 36.1* 37.0*  
 188 201 GRANS 91* 90* 69  
LYMPH 3* 2* 1*  
EOS 2 1 1 Cardiac Enzymes No results for input(s): CPK, CKND1, MEI in the last 72 hours. No lab exists for component: Alli Frank Coagulation Recent Labs  
  01/18/20 0351 01/17/20 
0401 .2* 65.0* INR >6.8* >6.8* Lipid Panel Lab Results Component Value Date/Time  Cholesterol, total 123 03/20/2019 08:26 AM  
 HDL Cholesterol 50 03/20/2019 08:26 AM  
 LDL, calculated 59 03/20/2019 08:26 AM  
 VLDL, calculated 14 03/20/2019 08:26 AM  
 Triglyceride 69 03/20/2019 08:26 AM  
  
BNP No results for input(s): BNPP in the last 72 hours. Liver Enzymes Recent Labs  
  01/18/20 
0351 TP 5.8* ALB 2.0*  
* SGOT 406* Thyroid Studies Lab Results Component Value Date/Time TSH 4.790 01/16/2020 04:55 PM  
    
 
 
Discharge Exam: 
Visit Joshua Bermudez BP (!) 69/41 Pulse (!) 0 Temp 98.9 °F (37.2 °C) Resp (!) 0 Ht 5' 10\" (1.778 m) Wt 108.4 kg (239 lb) SpO2 (!) 71% BMI 34.29 kg/m² General appearance: unresponsive. Pale, no capillary refill Lungs: no work of breathing noted. Heart: no heart sounds. Abdomen: soft, non-tender. Extremities:no obvious edema Neurologic: non responsive Current Discharge Medication List  
  
 
· Time spent to discharge patient 35 minutes Signed: 
Jade Jose DO 
1/18/2020 
7:56 AM

## 2020-01-18 NOTE — PROGRESS NOTES
Patient in v-fib. No respirations, no palpable pulse. Dr. Juan Gagnon, and house supervisor notified.

## 2020-01-18 NOTE — PROGRESS NOTES
Discussed the patient's case with his wife who at this time would like to de-escalate care and make the patient comfort care only. Will place comfort care order set.

## 2020-01-18 NOTE — PROGRESS NOTES
Per remote tele monitor tech,  \"pt had a slow 6 beat run of V-Tach and trigeminal PVCS, pt assessed, asymptomatic. Reported arrythmia with pt asymptomatic and that insulin held earlier for SQBS 181, due to pt's NPO status, to Dr. Junito Isabel. No orders received.

## 2020-01-18 NOTE — PROGRESS NOTES
Pt's spouse, Krunal Akhtar at [phone] notified of pt's declined condition, requiring an urgent transfer to ICU.

## 2020-01-18 NOTE — PROGRESS NOTES
Pharmacokinetic Consult to Pharmacist 
 
Kristie Himanshu is a 81 y.o. male being treated for bloodstream infection with vancomycin and zosyn. Height: 5' 10\" (177.8 cm)  Weight: 97.5 kg (215 lb) Lab Results Component Value Date/Time  (H) 01/18/2020 03:51 AM  
 Creatinine 4.01 (H) 01/18/2020 03:51 AM  
 WBC 19.9 (H) 01/18/2020 03:51 AM  
 Lactic acid 5.3 (HH) 01/18/2020 03:51 AM  
 Lactic Acid (POC) 3.17 (H) 01/16/2020 05:00 PM  
  
Estimated Creatinine Clearance: 16.3 mL/min (A) (based on SCr of 4.01 mg/dL (H)). CULTURES: 
1/16 BCx: GPC (Strep)- pending;  GNR- pending UCx: NGTD- pending Day 1 of vancomycin. Goal trough is 15 -20. Vancomycin dose initiated at 2g load x1. Will plan to redose vanco around random levels when less than 20. Will continue to follow patient. Thank you, 
Helena Montero, PharmD Clinical Pharmacist 
[phone]

## 2020-01-18 NOTE — PROGRESS NOTES
Notified by nursing staff that patient is pulseless and apnea. On exam, no heart sounds/respirations x 60 secs. Negative ophthalmic reflexes. Time of death: 06:12 May his memory be eternal.

## 2020-01-18 NOTE — PROGRESS NOTES
Transferred via bed to ICU room 372, by Chaparro Cabral RN, Davina Lobato RN Supervisor, Lo Montes, RT and Mary Garcia RT.

## 2020-01-18 NOTE — PROGRESS NOTES
TRANSFER - IN REPORT: 
 
Verbal report received from Chikis Calderón RN on Violetta Stacks  being received from  [id number] for urgent transfer Report consisted of patients Situation, Background, Assessment and  
Recommendations(SBAR). Information from the following report(s) SBAR was reviewed with the receiving nurse. Opportunity for questions and clarification was provided.

## 2020-01-18 NOTE — PROGRESS NOTES
Spoke with Dr. Niranjan Steele per leapfrog protocol. Orders received for bipap, bi-carb, fluid bolus, and additional antibiotics.

## 2020-01-18 NOTE — PROGRESS NOTES
Dr. Liliam Richardson in, additional nursing staff, Nursing Supervisor, Resp Therapists x2, Emerg Dept response team in. Bolus of NS started, O2 increased by Resp Therapists to 10L high flow N/C with O2 sat 95%. Orders received for ABGs, PCXR , and to transfer to ICU.

## 2020-01-18 NOTE — PROGRESS NOTES
Warfarin dosing per pharmacist 
 
Harlan Gutiérrez is a 81 y.o. male. Indication:  AFIB Goal INR:  2.5 - 3  (elderly patient with mechanical valve) Home dose:  5 mg or 7.5mg as directed Risk factors or significant drug interactions:  pravastatin, synthroid Other anticoagulants:  none Daily Monitoring Date  INR     Warfarin dose HGB              Notes 1/18              > 6.8                  hold                 12.8 
1/17              > 6.8                  hold 1/16  4.9  hold  12.5 Continue to hold Warfarin. Thank you, Radha Granado, YasminD.  BCPS

## 2020-01-18 NOTE — PROGRESS NOTES
HOSPITALIST RAPID RESPONSE NOTE 
 
NAME:  Anne Poole Age:  81 y.o. 
:   1935 MRN:   [id number] PCP: Christin Villegas MD 
Consulting MD: Treatment Team: Attending Provider: Jaydon Gibson DO; : Laz Brooks; Utilization Review: Pasquale White; Consulting Provider: Antoine Rock MD; Care Manager: Nirav Lawler, [facility] [place] Consulting Provider: Kiesha Cao MD; Consulting Provider: Efren Frey MD 
 
REASON FOR RAPID RESPONSE CALL: hypoxia, hypotension INTERVAL HISTORY:  
Nursing staff called Rapid Response when patient was noted to have vomiting and appeared short of breath. Initial O2 sats were in the 70s and BP had dropped to 80s/50s. Pt is minimally responsive and cannot provide additional historical details. Prior to Admission Medications Prescriptions Last Dose Informant Patient Reported? Taking? COUMADIN 5 mg tablet 2020 at Unknown time  No Yes Si to 1&1/2 tabs every day or as directed Insulin Needles, Disposable, (BD ULTRA-FINE MINI PEN NEEDLE) 31 gauge x 3/\" ndle 2020 at Unknown time  No Yes Sig: Testing TID as directed, E11.9  
aspirin delayed-release 81 mg tablet 2020 at Unknown time  Yes Yes Sig: Take 81 mg by mouth daily. carvedilol (COREG) 6.25 mg tablet 2020 at Unknown time  No Yes Sig: TAKE 1 TABLET BY MOUTH TWO  TIMES DAILY WITH MEALS  
colchicine 0.6 mg tablet 2020 at Unknown time  Yes Yes Sig: Take 20 mg by mouth daily. cyclobenzaprine (FLEXERIL) 5 mg tablet Not Taking at Unknown time  No No  
Sig: Take 1 Tab by mouth nightly. diclofenac (VOLTAREN) 1 % gel 2020 at Unknown time  No Yes Sig: Apply 1 g to affected area four (4) times daily. ferrous sulfate (IRON) 325 mg (65 mg iron) tablet 2020 at Unknown time  Yes Yes Sig: Take 65 mg by mouth Daily (before breakfast). furosemide (LASIX) 40 mg tablet 2020 at Unknown time  No Yes Sig: TAKE 1 TABLET BY MOUTH TWO  TIMES DAILY  
glucose blood VI test strips (ONETOUCH ULTRA BLUE TEST STRIP) strip   No No  
Sig: USE TO TEST THREE TIMES DAILY AND AS NEEDED, E11.9  
insulin aspart (NOVOLOG) 100 unit/mL injection 2020 at Unknown time  Yes Yes Sig: by SubCUTAneous route as needed. 6 am, lunch 4 ; 6 supper-plus few units by scale if high  
insulin glargine (LANTUS) 100 unit/mL injection 1/15/2020 at Unknown time  Yes Yes Si Units by SubCUTAneous route. As directed   
lancets 33 gauge misc 2020 at Unknown time  No Yes Sig: Test BS TID/PRN Dx E11.9  
levothyroxine (SYNTHROID) 125 mcg tablet 2020 at Unknown time  No Yes Sig: TAKE 1 TABLET BY MOUTH  DAILY BEFORE BREAKFAST  
lisinopril (PRINIVIL, ZESTRIL) 2.5 mg tablet 2020 at Unknown time  No Yes Sig: TAKE 1 TABLET BY MOUTH  DAILY  
metOLazone (ZAROXOLYN) 2.5 mg tablet Unknown at Unknown time  No No  
Sig: Take 1 Tab by mouth as needed. multivitamin (ONE A DAY) tablet 2020 at Unknown time  Yes Yes Sig: Take 1 Tab by mouth daily. omeprazole (PRILOSEC) 20 mg capsule 2020 at Unknown time  No Yes Sig: TAKE 1 CAPSULE BY MOUTH  DAILY pen needle, diabetic 33 gauge x 3/16\" ndle   No No  
Sig: Test bs TID/PRN Dx E11.9 pt uses easy fine one touch delica  
potassium chloride (K-DUR, KLOR-CON) 20 mEq tablet 2020 at Unknown time  No Yes Sig: Take 1 Tab by mouth daily. pravastatin (PRAVACHOL) 20 mg tablet 2020 at Unknown time  No Yes Sig: TAKE 1 TABLET BY MOUTH  NIGHTLY  
predniSONE (DELTASONE) 20 mg tablet 2020 at Unknown time  No Yes Six5d, 1 x5d  
tamsulosin (FLOMAX) 0.4 mg capsule 2020 at Unknown time  No Yes Sig: TAKE 1 CAPSULE BY MOUTH DAILY Facility-Administered Medications: None Objective:  
 
Visit Vitals BP (!) 69/41 Pulse (!) 59 Temp 98.8 °F (37.1 °C) Resp 27 Ht 5' 10\" (1.778 m) Wt 108.4 kg (239 lb) SpO2 95% BMI 34.29 kg/m² Temp (24hrs), Av °F (36.1 °C), Min:94.5 °F (34.7 °C), Max:98.8 °F (37.1 °C) Oxygen Therapy O2 Sat (%): 95 % (20 043) Pulse via Oximetry: 61 beats per minute (20 0430) O2 Device: Room air (20 8544) Physical Exam: 
General:    The patient is an elderly male, minimally responsive. Head:   Normocephalic/atraumatic. Lungs:   Scattered bilateral rhonchi, no wheezes or crackles. Mild respiratory distress, no accessory muscle use. Heart:   Tachycardic, regular rhythm, without murmurs, rubs, or gallops. Abdomen:   Soft, non-tender, non-distended with normoactive bowel sounds. Extremities: Cyanotic fingers initially Skin:     Normal color, texture, and turgor. No rashes, lesions, or jaundice. Pulses: Radial and dorsalis pedis pulses present 2+ bilaterally. Capillary refill <2s. Neurologic: CN II-XII grossly intact and symmetrical.  
  Moving all four extremities well with no focal deficits. Psychiatric: Somnolent, miniamlly responsive. Katherene Means Data Review:  
Recent Results (from the past 24 hour(s)) GLUCOSE, POC Collection Time: 20  7:19 AM  
Result Value Ref Range Glucose (POC) 111 (H) 65 - 100 mg/dL LACTIC ACID Collection Time: 20  9:27 AM  
Result Value Ref Range Lactic acid 3.4 (HH) 0.4 - 2.0 MMOL/L  
GLUCOSE, POC Collection Time: 20 11:05 AM  
Result Value Ref Range Glucose (POC) 131 (H) 65 - 100 mg/dL GLUCOSE, POC Collection Time: 20  5:11 PM  
Result Value Ref Range Glucose (POC) 188 (H) 65 - 100 mg/dL PROTEIN/CREATININE RATIO, URINE Collection Time: 20  8:09 PM  
Result Value Ref Range Protein, urine random 126 mg/dL Creatinine, urine 98.00 mg/dL Protein/Creat. urine Ratio 1.3 URINALYSIS W/ RFLX MICROSCOPIC Collection Time: 20  8:09 PM  
Result Value Ref Range Color RED Appearance TURBID Specific gravity 1.019 1.001 - 1.023    
 pH (UA) 5.0 5.0 - 9.0 Protein 30 (A) NEG mg/dL Glucose NEGATIVE  mg/dL Ketone TRACE (A) NEG mg/dL Bilirubin LARGE (A) NEG Blood LARGE (A) NEG Urobilinogen 1.0 0.2 - 1.0 EU/dL Nitrites POSITIVE (A) NEG Leukocyte Esterase SMALL (A) NEG    
 WBC 0-3 0 /hpf  
 RBC 10-20 0 /hpf Epithelial cells 0-3 0 /hpf Bacteria TRACE 0 /hpf  
PLEASE READ & DOCUMENT PPD TEST IN 24 HRS Collection Time: 01/17/20  8:58 PM  
Result Value Ref Range PPD Negative Negative  
 mm Induration 0 0 - 5 mm GLUCOSE, POC Collection Time: 01/17/20 10:07 PM  
Result Value Ref Range Glucose (POC) 181 (H) 65 - 100 mg/dL POC G3 Collection Time: 01/18/20  3:22 AM  
Result Value Ref Range Device: Non rebreather    
 pH (POC) 7.154 (LL) 7.35 - 7.45    
 pCO2 (POC) 52.9 (H) 35 - 45 MMHG  
 pO2 (POC) 108 (H) 75 - 100 MMHG  
 HCO3 (POC) 18.6 (L) 22 - 26 MMOL/L  
 sO2 (POC) 96 95 - 98 % Base deficit (POC) 10 mmol/L Allens test (POC) YES Site LEFT RADIAL Specimen type (POC) ARTERIAL Performed by Ashish García CO2, POC 20 MMOL/L Flow rate (POC) 15.000 L/min COLLECT TIME 319 PROTHROMBIN TIME + INR Collection Time: 01/18/20  3:51 AM  
Result Value Ref Range Prothrombin time 101.2 (H) 11.7 - 14.5 sec INR >6.8 (HH)   
CBC WITH AUTOMATED DIFF Collection Time: 01/18/20  3:51 AM  
Result Value Ref Range WBC 19.9 (H) 4.3 - 11.1 K/uL  
 RBC 4.04 (L) 4.23 - 5.6 M/uL  
 HGB 12.8 (L) 13.6 - 17.2 g/dL HCT 39.8 (L) 41.1 - 50.3 % MCV 98.5 (H) 79.6 - 97.8 FL  
 MCH 31.7 26.1 - 32.9 PG  
 MCHC 32.2 31.4 - 35.0 g/dL  
 RDW 17.4 (H) 11.9 - 14.6 % PLATELET 975 291 - 763 K/uL MPV 13.1 (H) 9.4 - 12.3 FL ABSOLUTE NRBC 0.03 0.0 - 0.2 K/uL  
 DF AUTOMATED NEUTROPHILS 91 (H) 43 - 78 % LYMPHOCYTES 3 (L) 13 - 44 % MONOCYTES 2 (L) 4.0 - 12.0 % EOSINOPHILS 2 0.5 - 7.8 % BASOPHILS 1 0.0 - 2.0 % IMMATURE GRANULOCYTES 1 0.0 - 5.0 %  
 ABS. NEUTROPHILS 18.2 (H) 1.7 - 8.2 K/UL ABS. LYMPHOCYTES 0.7 0.5 - 4.6 K/UL  
 ABS. MONOCYTES 0.5 0.1 - 1.3 K/UL  
 ABS. EOSINOPHILS 0.3 0.0 - 0.8 K/UL  
 ABS. BASOPHILS 0.1 0.0 - 0.2 K/UL  
 ABS. IMM. GRANS. 0.1 0.0 - 0.5 K/UL METABOLIC PANEL, COMPREHENSIVE Collection Time: 01/18/20  3:51 AM  
Result Value Ref Range Sodium 136 136 - 145 mmol/L Potassium 5.9 (H) 3.5 - 5.1 mmol/L Chloride 106 98 - 107 mmol/L  
 CO2 17 (L) 21 - 32 mmol/L Anion gap 13 7 - 16 mmol/L Glucose 169 (H) 65 - 100 mg/dL  (H) 8 - 23 MG/DL Creatinine 4.01 (H) 0.8 - 1.5 MG/DL  
 GFR est AA 18 (L) >60 ml/min/1.73m2 GFR est non-AA 15 (L) >60 ml/min/1.73m2 Calcium 7.5 (L) 8.3 - 10.4 MG/DL Bilirubin, total 7.3 (H) 0.2 - 1.1 MG/DL  
 ALT (SGPT) 360 (H) 12 - 65 U/L  
 AST (SGOT) 406 (H) 15 - 37 U/L Alk. phosphatase 322 (H) 50 - 136 U/L Protein, total 5.8 (L) 6.3 - 8.2 g/dL Albumin 2.0 (L) 3.2 - 4.6 g/dL Globulin 3.8 (H) 2.3 - 3.5 g/dL A-G Ratio 0.5 (L) 1.2 - 3.5 LACTIC ACID Collection Time: 01/18/20  3:51 AM  
Result Value Ref Range Lactic acid 5.3 (HH) 0.4 - 2.0 MMOL/L Imaging Tremayne Nevarez /Studies: Xr Chest Sngl V Result Date: 1/18/2020 IMPRESSION: Left lower lobe atelectasis or pneumonia unchanged. Xr Chest Sngl V Result Date: 1/17/2020 IMPRESSION: Low lung volumes with minimal atelectasis or consolidation in the left lung base. Xr Abd (kub) Result Date: 1/17/2020 IMPRESSION: Low lung volumes with minimal atelectasis or consolidation in the left lung base. [id number] Result Date: 1/17/2020 IMPRESSION: 1. Sonographic findings suspicious for acute cholecystitis. The common bile duct appears normal in caliber. 2. Heterogeneous appearing liver with numerous echogenic foci. These findings are nonspecific but can be seen in hepatitis. 3. Nonvisualization of the body and tail of the pancreas due to overlying bowel gas. Assessment and Plan: Active Hospital Problems Diagnosis Date Noted  [facility]epsis [facility] 01/17/2020  Bacteremia 01/17/2020  Abdominal pain 01/17/2020  TESSIE (acute kidney injury) ([facility] 01/16/2020  Controlled type 2 diabetes mellitus with stage 2 chronic kidney disease, with long-term current use of insulin ([facility] 11/22/2016  Acquired hypothyroidism 11/22/2016  Hypertensive disorder 11/22/2016  S/P CABG (coronary artery bypass graft) 09/23/2016  
  2007 - after being found to have an abnormal EKG  Chronic systolic heart failure ([facility] 09/23/2016 2013 ([place]) - EF 19%, RVSP 52, severe MR Mar 2017 - EF 47%, mod to severe MR, RVSP 55 Feb 2018 - EF 45-50%, normal AVR, mild MR, RVSP 52 Feb 2019- EF 45-50%, normal AVR, mild MR, RVSP 49 
  
 Atrial fibrillation, chronic, permanent ([facility] 09/23/2016  
  rate controlled, anticoagulated - Concern for aspiration event given sudden hypoxia and hypotension. Will bolus IVF, start BiPAP, transfer to ICU. Will likely need IV pressors. Will also broaden antibiotic coverage and check stat CXR and labs. - Disposition: Transfer to ICU 
 
- Code Status: DNR 
 
- Critical care time: 20 minutes More than 50% of the time documented was spent in face-to-face contact with the patient and in the care of the patient on the floor/unit where the patient is located. Signed By: Winston Bingham MD   
 January 18, 2020

## 2020-01-18 NOTE — PROGRESS NOTES
Resting quietly, awake, resp even, unlab, skin warm, dry, with scattered ecchymotic areas all over arms, legs with rash. Oriented to person and place. AP irreg, lungs sounds clear. After assessment completed, #[id number] non-latex, silicone lou catheter placed, per orders, urine output hazy, judie in color, tolerated well. Urine specimen collected and to lab. No c/o, no distress noted.

## 2020-01-18 NOTE — PROGRESS NOTES
TRANSFER - OUT REPORT: 
Rapid response ending. Verbal report given to Ana Zambrano RN on Dg Ogden  being transferred to ICU(unit) for urgent transfer Report consisted of patients Situation, Background, Assessment and  
Recommendations(SBAR). Information from the following report(s) SBAR, Kardex, [facility] and Recent Results was reviewed with the receiving nurse. Lines:  
Peripheral IV 01/16/20 Left Antecubital (Active) Site Assessment Clean, dry, & intact 1/17/2020  3:27 PM  
Phlebitis Assessment 0 1/17/2020  3:27 PM  
Infiltration Assessment 0 1/17/2020  3:27 PM  
Dressing Status Clean, dry, & intact 1/17/2020  3:27 PM  
Dressing Type Tape;Transparent 1/17/2020  3:27 PM  
Hub Color/Line Status Capped 1/17/2020  3:27 PM  
Action Taken Blood drawn 1/16/2020  4:48 PM  
   
Peripheral IV 01/16/20 Right Forearm (Active) Site Assessment Clean, dry, & intact 1/17/2020  3:27 PM  
Phlebitis Assessment 0 1/17/2020  3:27 PM  
Infiltration Assessment 0 1/17/2020  3:27 PM  
Dressing Status Clean, dry, & intact 1/17/2020  3:27 PM  
Dressing Type Tape;Transparent 1/17/2020  3:27 PM  
Hub Color/Line Status Infusing 1/17/2020  3:27 PM  
Action Taken Blood drawn 1/16/2020  4:49 PM  
  
 
Opportunity for questions and clarification was provided. Patient to be transported with: 
 O2 @ 10 liters high flow O2.

## 2020-01-18 NOTE — PROGRESS NOTES
Care Management Interventions Mode of Transport at Discharge: BLS Transition of Care Consult (CM Consult): Discharge Planning, SNF Physical Therapy Consult: Yes Occupational Therapy Consult: Yes Current Support Network: Lives with Spouse Confirm Follow Up Transport: Family The Patient and/or Patient Representative was Provided with a Choice of Provider and Agrees with the Discharge Plan?: Yes Freedom of Choice List was Provided with Basic Dialogue that Supports the Patient's Individualized Plan of Care/Goals, Treatment Preferences and Shares the Quality Data Associated with the Providers?: Yes Discharge Location Discharge Placement:  [facility] Drake  [email or web address]

## 2020-01-18 NOTE — PROGRESS NOTES
Respirations shallow and regular. Patient using accessory muscles. Oxygen saturation 90% on 8L nasal cannula. Patient placed on non-rebreather. RT Veronica at bedside to perform ABG.

## 2020-01-18 NOTE — PROGRESS NOTES
Resting quietly, head of bed remaining elevated, brownish green emesis noted on pt's mouth, eyes closed. Aroused briefly, lethargic. Congestion audible. Suctioned small amount of emesis from mouth and oropharynx, with congestion unresolved. Lungs sounds coarse. Mumbled, \"yes\", in response to \"Mr. Joycelyn Perez, can you hear me? \" while keeping eyes closed.

## 2020-01-18 NOTE — PROGRESS NOTES
Resting quietly, arousing easily. Benadryl offered for sleep with pt denying need, stating I've been sleeping off and on. No needs, no c/o, no distress noted. Low BPs reported to Dr. Subhash Mercado and clarified no order for a repeat lactic acid, last result 3.4. No orders received.

## 2020-01-18 NOTE — PROGRESS NOTES
Called for death Wife was peaceful at bedside Their rabbi has been called Read Fiona, staff Elizabeth [address]eng [address], [address] [facility]  /   [email or web address]

## 2020-01-18 NOTE — PROGRESS NOTES
Patient medicated with morphine for comfort. Pressors stopped and bi-pap mask removed. Patient is resting comfortably with eyes closed. Wife at bedside.

## 2020-01-20 LAB
BACTERIA SPEC CULT: ABNORMAL
SERVICE CMNT-IMP: ABNORMAL

## 2020-01-31 LAB
BACTERIA SPEC CULT: ABNORMAL
GRAM STN SPEC: ABNORMAL
Lab: NORMAL
REFERENCE LAB,REFLB: NORMAL
SERVICE CMNT-IMP: ABNORMAL
SERVICE CMNT-IMP: ABNORMAL
TEST DESCRIPTION:,ATST: NORMAL

## 2021-01-15 DIAGNOSIS — E66.01 MORBID (SEVERE) OBESITY DUE TO EXCESS CALORIES: ICD-10-CM

## 2021-01-15 DIAGNOSIS — I42.9 CARDIOMYOPATHY, UNSPECIFIED: ICD-10-CM

## 2021-01-15 DIAGNOSIS — Z00.00 ENCOUNTER FOR GENERAL ADULT MEDICAL EXAMINATION W/OUT ABNORMAL FINDINGS: ICD-10-CM

## 2021-07-07 NOTE — HPI: NON-MELANOMA SKIN CANCER F/U (HISTORY OF NMSC)
How Many Skin Cancers Have You Had?: one
What Is The Reason For Today's Visit?: History of Non-Melanoma Skin Cancer
Patient unable to complete

## 2024-06-19 NOTE — PROGRESS NOTES
Attempted ultrasound IV, unsuccessful. Primary RN and charge made aware.   Results for Chino Cadena (MRN [id number]) as of 1/18/2020 03:50 Ref. Range 1/18/2020 03:22  
pH (POC) Latest Ref Range: 7.35 - 7.45   7.154 (LL)  
pCO2 (POC) Latest Ref Range: 35 - 45 MMHG 52.9 (H)  
pO2 (POC) Latest Ref Range: 75 - 100 MMHG 108 (H) HCO3 (POC) Latest Ref Range: 22 - 26 MMOL/L 18.6 (L)  
sO2 (POC) Latest Ref Range: 95 - 98 % 96 Base deficit (POC) Latest Units: mmol/L 10 Specimen type (POC) Latest Units:   ARTERIAL Flow rate (POC) Latest Units: L/min 15.000 Site Latest Units:   LEFT RADIAL Device: Latest Units:   Non rebreather Allens test (POC) Latest Units:   YES  
 
ABG results called to Dr. Donta Garrido by Robbin Morris.  Orders to keep on NRB and repeat ABG at 6am.